# Patient Record
Sex: MALE | Race: WHITE | NOT HISPANIC OR LATINO | Employment: FULL TIME | URBAN - METROPOLITAN AREA
[De-identification: names, ages, dates, MRNs, and addresses within clinical notes are randomized per-mention and may not be internally consistent; named-entity substitution may affect disease eponyms.]

---

## 2017-01-12 ENCOUNTER — OFFICE VISIT (OUTPATIENT)
Dept: FAMILY MEDICINE CLINIC | Facility: CLINIC | Age: 62
End: 2017-01-12

## 2017-01-12 VITALS
DIASTOLIC BLOOD PRESSURE: 80 MMHG | OXYGEN SATURATION: 98 % | RESPIRATION RATE: 20 BRPM | SYSTOLIC BLOOD PRESSURE: 148 MMHG | HEART RATE: 67 BPM

## 2017-01-12 DIAGNOSIS — G47.33 OBSTRUCTIVE SLEEP APNEA: Primary | ICD-10-CM

## 2017-01-12 PROBLEM — M24.9: Status: ACTIVE | Noted: 2017-01-12

## 2017-01-12 PROBLEM — L03.90 CELLULITIS: Status: ACTIVE | Noted: 2017-01-12

## 2017-01-12 PROBLEM — E66.3 EXCESS WEIGHT: Status: ACTIVE | Noted: 2017-01-12

## 2017-01-12 PROBLEM — E29.1 EUNUCHOIDISM: Status: ACTIVE | Noted: 2017-01-12

## 2017-01-12 PROBLEM — N40.0 BENIGN FIBROMA OF PROSTATE: Status: ACTIVE | Noted: 2017-01-12

## 2017-01-12 PROCEDURE — 99213 OFFICE O/P EST LOW 20 MIN: CPT | Performed by: PHYSICIAN ASSISTANT

## 2017-01-12 NOTE — PROGRESS NOTES
Subjective   Nabor Muhammad is a 61 y.o. male.   Chief Complaint   Patient presents with   • Sleep Apnea     follow up        History of Present Illness   Nabor is a 61-year-old male who presents for sleep apnea management.  States he uses his CPAP machine nightly.  He has seen great improvement with his machine.  Overall sleep has improved.  Denied any chest pain, shortness of air, dizziness, headache, dizziness, swelling of his extremities or daytime somnolence.  Appetite has been healthy and sleep has been normal.  He does exercise routinely.  Nabor needs refills on his CPAP supplies.    The following portions of the patient's history were reviewed and updated as appropriate: allergies, current medications, past family history, past medical history, past social history and past surgical history.    Review of Systems   Constitutional: Negative.    HENT: Negative.    Eyes: Negative.    Respiratory: Negative.  Negative for cough, shortness of breath and wheezing.    Cardiovascular: Negative.  Negative for chest pain, palpitations and leg swelling.   Gastrointestinal: Negative.    Endocrine: Negative.    Genitourinary: Negative.    Musculoskeletal: Negative.    Skin: Negative.    Allergic/Immunologic: Negative.    Neurological: Negative.  Negative for dizziness, light-headedness and headaches.   Hematological: Negative.    Psychiatric/Behavioral: Negative.  Negative for sleep disturbance.     Vitals:    01/12/17 1304 01/12/17 1623   BP: 152/74 148/80   Pulse: 67    Resp: 20    SpO2: 98%        BP Readings from Last 3 Encounters:   01/12/17 148/80   12/14/16 122/84   09/11/14 132/82     No Known Allergies     Objective   Physical Exam   Constitutional: He is oriented to person, place, and time. Vital signs are normal. He appears well-developed.   Neck: Trachea normal and phonation normal. Neck supple. No edema present.   Cardiovascular: Normal rate, regular rhythm, S1 normal, S2 normal, normal heart sounds and  normal pulses.    Pulmonary/Chest: Effort normal and breath sounds normal.   Neurological: He is alert and oriented to person, place, and time.   Skin: Skin is warm, dry and intact.   Psychiatric: He has a normal mood and affect. His speech is normal and behavior is normal. Judgment and thought content normal. Cognition and memory are normal.       Assessment/Plan   Nabor was seen today for sleep apnea.    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  He is currently using his CPAP machine nightly and has had great improvement.  Nabor needs a ref[facility] on his CPAP supplies [facility].  He has no complaints at today's office visit.  I have given him a written prescription for his CPAP and supplies.  He will keep his follow-up appointments with his specialists as well as sleep study doctor.            Dragon transcription disclaimer    Much of this encounter note is an electronic transcription/translation of spoken language to printed text.  The electronic translation of spoken language may permit erroneous, or at times, nonsensical words or phrases to be inadvertently transcribed.  Although I have reviewed the note for such errors, some may still exist.    Claire Jerry PA-C  Family Practice  .

## 2017-04-25 RX ORDER — HYDROCHLOROTHIAZIDE 25 MG/1
TABLET ORAL
Qty: 90 TABLET | Refills: 2 | Status: SHIPPED | OUTPATIENT
Start: 2017-04-25 | End: 2019-09-16

## 2017-07-20 RX ORDER — PREDNISONE 20 MG/1
TABLET ORAL
Qty: 13 TABLET | Refills: 0 | Status: SHIPPED | OUTPATIENT
Start: 2017-07-20 | End: 2018-06-06

## 2017-09-26 DIAGNOSIS — L23.7 POISON IVY: Primary | ICD-10-CM

## 2017-09-26 RX ORDER — PREDNISONE 20 MG/1
TABLET ORAL
Qty: 13 TABLET | Refills: 0 | Status: SHIPPED | OUTPATIENT
Start: 2017-09-26 | End: 2018-06-06

## 2018-03-15 RX ORDER — ANTHOXANTHUM ODORATUM POLLEN, DACTYLIS GLOMERATA POLLEN, LOLIUM PERENNE POLLEN, PHLEUM PRATENSE POLLEN, AND POA PRATENSIS POLLEN 300; 300; 300; 300; 300 [IR]/1; [IR]/1; [IR]/1; [IR]/1; [IR]/1
1 TABLET, ORALLY DISINTEGRATING SUBLINGUAL DAILY
Qty: 30 TABLET | Refills: 6 | Status: SHIPPED | OUTPATIENT
Start: 2018-03-15 | End: 2018-07-18

## 2018-06-06 RX ORDER — ATOVAQUONE AND PROGUANIL HYDROCHLORIDE 250; 100 MG/1; MG/1
1 TABLET, FILM COATED ORAL
Qty: 15 TABLET | Refills: 0 | Status: SHIPPED | OUTPATIENT
Start: 2018-06-06 | End: 2018-07-18

## 2018-06-06 RX ORDER — CIPROFLOXACIN 500 MG/1
500 TABLET, FILM COATED ORAL EVERY 12 HOURS SCHEDULED
Qty: 20 TABLET | Refills: 0 | Status: SHIPPED | OUTPATIENT
Start: 2018-06-06 | End: 2018-07-18

## 2018-07-18 RX ORDER — PREDNISONE 20 MG/1
TABLET ORAL
Qty: 13 TABLET | Refills: 0 | Status: SHIPPED | OUTPATIENT
Start: 2018-07-18 | End: 2018-10-20

## 2018-10-20 RX ORDER — TAMSULOSIN HYDROCHLORIDE 0.4 MG/1
CAPSULE ORAL
Qty: 30 CAPSULE
Start: 2018-10-20

## 2018-10-20 RX ORDER — FINASTERIDE 5 MG/1
5 TABLET, FILM COATED ORAL DAILY
Qty: 30 TABLET | Refills: 0
Start: 2018-10-20

## 2018-10-20 RX ORDER — FLUTICASONE PROPIONATE 50 MCG
2 SPRAY, SUSPENSION (ML) NASAL DAILY
Qty: 1 BOTTLE | Refills: 0
Start: 2018-10-20 | End: 2020-10-09

## 2019-05-23 DIAGNOSIS — R53.82 CHRONIC FATIGUE: ICD-10-CM

## 2019-05-23 DIAGNOSIS — I10 ESSENTIAL HYPERTENSION: Primary | ICD-10-CM

## 2019-05-23 DIAGNOSIS — N40.1 BENIGN PROSTATIC HYPERPLASIA WITH LOWER URINARY TRACT SYMPTOMS, SYMPTOM DETAILS UNSPECIFIED: ICD-10-CM

## 2019-05-23 DIAGNOSIS — E29.1 HYPOGONADISM IN MALE: ICD-10-CM

## 2019-05-28 LAB
ALBUMIN SERPL-MCNC: 4.1 G/DL (ref 3.5–5.2)
ALBUMIN/GLOB SERPL: 1.7 G/DL
ALP SERPL-CCNC: 79 U/L (ref 39–117)
ALT SERPL-CCNC: 35 U/L (ref 1–41)
AST SERPL-CCNC: 22 U/L (ref 1–40)
BASOPHILS # BLD AUTO: 0.07 10*3/MM3 (ref 0–0.2)
BASOPHILS NFR BLD AUTO: 0.8 % (ref 0–1.5)
BILIRUB SERPL-MCNC: 0.5 MG/DL (ref 0.2–1.2)
BUN SERPL-MCNC: 24 MG/DL (ref 8–23)
BUN/CREAT SERPL: 19.8 (ref 7–25)
CALCIUM SERPL-MCNC: 9.8 MG/DL (ref 8.6–10.5)
CHLORIDE SERPL-SCNC: 103 MMOL/L (ref 98–107)
CHOLEST SERPL-MCNC: 162 MG/DL (ref 0–200)
CO2 SERPL-SCNC: 27.7 MMOL/L (ref 22–29)
CREAT SERPL-MCNC: 1.21 MG/DL (ref 0.76–1.27)
EOSINOPHIL # BLD AUTO: 0.13 10*3/MM3 (ref 0–0.4)
EOSINOPHIL NFR BLD AUTO: 1.5 % (ref 0.3–6.2)
ERYTHROCYTE [DISTWIDTH] IN BLOOD BY AUTOMATED COUNT: 12.7 % (ref 12.3–15.4)
GLOBULIN SER CALC-MCNC: 2.4 GM/DL
GLUCOSE SERPL-MCNC: 99 MG/DL (ref 65–99)
HCT VFR BLD AUTO: 46 % (ref 37.5–51)
HDLC SERPL-MCNC: 56 MG/DL (ref 40–60)
HGB BLD-MCNC: 14.8 G/DL (ref 13–17.7)
IMM GRANULOCYTES # BLD AUTO: 0.03 10*3/MM3 (ref 0–0.05)
IMM GRANULOCYTES NFR BLD AUTO: 0.4 % (ref 0–0.5)
LDLC SERPL CALC-MCNC: 88 MG/DL (ref 0–100)
LDLC/HDLC SERPL: 1.56 {RATIO}
LYMPHOCYTES # BLD AUTO: 1.97 10*3/MM3 (ref 0.7–3.1)
LYMPHOCYTES NFR BLD AUTO: 23 % (ref 19.6–45.3)
MCH RBC QN AUTO: 29.8 PG (ref 26.6–33)
MCHC RBC AUTO-ENTMCNC: 32.2 G/DL (ref 31.5–35.7)
MCV RBC AUTO: 92.7 FL (ref 79–97)
MONOCYTES # BLD AUTO: 0.7 10*3/MM3 (ref 0.1–0.9)
MONOCYTES NFR BLD AUTO: 8.2 % (ref 5–12)
NEUTROPHILS # BLD AUTO: 5.67 10*3/MM3 (ref 1.7–7)
NEUTROPHILS NFR BLD AUTO: 66.1 % (ref 42.7–76)
NRBC BLD AUTO-RTO: 0 /100 WBC (ref 0–0.2)
PLATELET # BLD AUTO: 235 10*3/MM3 (ref 140–450)
POTASSIUM SERPL-SCNC: 4.9 MMOL/L (ref 3.5–5.2)
PROT SERPL-MCNC: 6.5 G/DL (ref 6–8.5)
PSA SERPL-MCNC: 1.1 NG/ML (ref 0–4)
RBC # BLD AUTO: 4.96 10*6/MM3 (ref 4.14–5.8)
SODIUM SERPL-SCNC: 142 MMOL/L (ref 136–145)
TESTOST FREE SERPL-MCNC: 4.2 PG/ML (ref 6.6–18.1)
TESTOST SERPL-MCNC: 276.9 NG/DL (ref 264–916)
TRIGL SERPL-MCNC: 92 MG/DL (ref 0–150)
TSH SERPL DL<=0.005 MIU/L-ACNC: 1.77 MIU/ML (ref 0.27–4.2)
VLDLC SERPL CALC-MCNC: 18.4 MG/DL
WBC # BLD AUTO: 8.57 10*3/MM3 (ref 3.4–10.8)

## 2019-07-25 ENCOUNTER — RESULTS ENCOUNTER (OUTPATIENT)
Dept: FAMILY MEDICINE CLINIC | Facility: CLINIC | Age: 64
End: 2019-07-25

## 2019-07-25 DIAGNOSIS — Z12.11 SPECIAL SCREENING FOR MALIGNANT NEOPLASMS, COLON: ICD-10-CM

## 2019-07-25 DIAGNOSIS — Z12.12 SCREENING FOR MALIGNANT NEOPLASM OF THE RECTUM: ICD-10-CM

## 2019-07-25 DIAGNOSIS — Z12.11 SPECIAL SCREENING FOR MALIGNANT NEOPLASMS, COLON: Primary | ICD-10-CM

## 2019-07-29 ENCOUNTER — HOSPITAL ENCOUNTER (OUTPATIENT)
Dept: GENERAL RADIOLOGY | Facility: HOSPITAL | Age: 64
Discharge: HOME | End: 2019-07-29
Admitting: PHYSICIAN ASSISTANT

## 2019-07-29 DIAGNOSIS — M79.672 FOOT PAIN, LEFT: ICD-10-CM

## 2019-07-29 DIAGNOSIS — M25.475 SWELLING OF FOOT JOINT, LEFT: ICD-10-CM

## 2019-07-29 DIAGNOSIS — M79.672 FOOT PAIN, LEFT: Primary | ICD-10-CM

## 2019-07-29 PROCEDURE — 73630 X-RAY EXAM OF FOOT: CPT

## 2019-09-16 ENCOUNTER — OFFICE VISIT (OUTPATIENT)
Dept: FAMILY MEDICINE CLINIC | Facility: CLINIC | Age: 64
End: 2019-09-16

## 2019-09-16 VITALS
RESPIRATION RATE: 16 BRPM | HEIGHT: 71 IN | DIASTOLIC BLOOD PRESSURE: 84 MMHG | HEART RATE: 77 BPM | BODY MASS INDEX: 35.4 KG/M2 | SYSTOLIC BLOOD PRESSURE: 130 MMHG | OXYGEN SATURATION: 98 %

## 2019-09-16 DIAGNOSIS — G47.33 OBSTRUCTIVE SLEEP APNEA: Primary | ICD-10-CM

## 2019-09-16 PROCEDURE — 99213 OFFICE O/P EST LOW 20 MIN: CPT | Performed by: PHYSICIAN ASSISTANT

## 2019-09-16 RX ORDER — OLMESARTAN MEDOXOMIL AND HYDROCHLOROTHIAZIDE 40/25 40; 25 MG/1; MG/1
1 TABLET ORAL
COMMUNITY
Start: 2019-06-10

## 2019-09-16 RX ORDER — OXYCODONE HYDROCHLORIDE AND ACETAMINOPHEN 5; 325 MG/1; MG/1
TABLET ORAL
COMMUNITY
Start: 2019-09-06 | End: 2020-10-09

## 2019-09-16 NOTE — PROGRESS NOTES
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 male presents for   Chief Complaint   Patient presents with   • Sleep Apnea       History of Present Illness     Nabor is a 63-year-old male who presents for sleep apnea management.  Currently using a CPAP machine nightly for his sleep apnea.  Sleep study was performed greater than 5 years ago by Dr. Patel.  States he is due for a new sleep apnea machine.  Needs appointment visit with orders sent to [facility].  Currently using 11 cm of water pressure nightly on his CPAP machine.  Sleep has been good with his CPAP machine.  Denied any snoring, fatigue, chest pain, or shortness of air.    The following portions of the patient's history were reviewed and updated as appropriate: allergies, current medications, past family history, past medical history, past social history, past surgical history and problem list.    Review of Systems   Respiratory: Negative for cough, chest tightness and shortness of breath.    Cardiovascular: Negative for chest pain, palpitations and leg swelling.   Neurological: Negative for dizziness, light-headedness and headaches.   Psychiatric/Behavioral:        Good with CPAP         Vitals:    09/16/19 1219   BP: 130/84   Pulse: 77   Resp: 16   SpO2: 98%   Height: 180.3 cm (71\")     Wt Readings from Last 3 Encounters:   12/14/16 115 kg (253 lb 12.8 oz)     BP Readings from Last 3 Encounters:   09/16/19 130/84   01/12/17 148/80   12/14/16 122/84     Social History     Socioeconomic History   • Marital status:      Spouse name: Not on file   • Number of children: Not on file   • Years of education: Not on file   • Highest education level: Not on file   Tobacco Use   • Smoking status: Never Smoker   • Smokeless tobacco: Never Used   Substance and Sexual Activity   • Alcohol use: Yes   • Drug use: No   • Sexual activity: Defer       No Known Allergies    Body mass index is 35.4 kg/m².    Objective   Physical Exam   Constitutional: He is " oriented to person, place, and time. Vital signs are normal. He appears well-developed and well-nourished.   Neck: Trachea normal and phonation normal. No tracheal tenderness present. No tracheal deviation and no edema present.   Cardiovascular: Normal rate, regular rhythm, S1 normal, S2 normal, normal heart sounds and normal pulses.   No murmur heard.  Pulmonary/Chest: Effort normal and breath sounds normal.   Neurological: He is alert and oriented to person, place, and time.   Skin: Skin is warm, dry and intact. Capillary refill takes less than 2 seconds.   Psychiatric: He has a normal mood and affect. His speech is normal and behavior is normal. Judgment and thought content normal. Cognition and memory are normal.       Assessment/Plan   Nabor was seen today for sleep apnea.    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  We will send orders for new sleep pap machine/humidifier with mask and supplies to [facility].  Currently using 11 cm of water pressure.  He will take copy of sleep study to his appointment.      TONEY Moore [facility]  [address] [address]  Dept: [phone]  Dept Fax: [phone]  Loc: [phone]  Loc Fax: [phone]

## 2019-12-18 DIAGNOSIS — Z23 NEED FOR SHINGLES VACCINE: Primary | ICD-10-CM

## 2020-07-06 RX ORDER — PREDNISONE 20 MG/1
TABLET ORAL
Qty: 13 TABLET | Refills: 0 | Status: SHIPPED | OUTPATIENT
Start: 2020-07-06 | End: 2020-10-09

## 2020-10-09 ENCOUNTER — OFFICE VISIT (OUTPATIENT)
Dept: ORTHOPEDIC SURGERY | Facility: CLINIC | Age: 65
End: 2020-10-09

## 2020-10-09 VITALS
HEART RATE: 72 BPM | HEIGHT: 71 IN | BODY MASS INDEX: 35 KG/M2 | WEIGHT: 250 LBS | DIASTOLIC BLOOD PRESSURE: 81 MMHG | SYSTOLIC BLOOD PRESSURE: 135 MMHG

## 2020-10-09 DIAGNOSIS — M25.562 MECHANICAL KNEE PAIN, LEFT: ICD-10-CM

## 2020-10-09 DIAGNOSIS — R52 PAIN: Primary | ICD-10-CM

## 2020-10-09 DIAGNOSIS — M17.12 PRIMARY OSTEOARTHRITIS OF LEFT KNEE: ICD-10-CM

## 2020-10-09 PROCEDURE — 99214 OFFICE O/P EST MOD 30 MIN: CPT | Performed by: ORTHOPAEDIC SURGERY

## 2020-10-09 PROCEDURE — 73562 X-RAY EXAM OF KNEE 3: CPT | Performed by: ORTHOPAEDIC SURGERY

## 2020-10-09 RX ORDER — ESOMEPRAZOLE MAGNESIUM 40 MG/1
40 CAPSULE, DELAYED RELEASE ORAL
COMMUNITY

## 2020-10-09 NOTE — PROGRESS NOTES
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 male.    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  We have previously evaluated him for his left knee with noted degenerative medial meniscal tear at that point in time.  Patient states that approximately 1 week ago he started to have sharp pain localized to the posterior lateral aspect of his left knee occurred when he was standing from a seated position, since then his pain is been moderately intense rates it as a 2-3 out of 10 currently, aching in nature primarily.  Exacerbated with extending the knee and then pain does become severe at that point time, including just getting up from a seated position.  Denies any micah locking or catching the knee, does note some mild swelling.  Denies radiation of pain, denies associated numbness or tingling.        Social History     Occupational History   • Not on file   Tobacco Use   • Smoking status: Never Smoker   • Smokeless tobacco: Never Used   Substance and Sexual Activity   • Alcohol use: Yes   • Drug use: No   • Sexual activity: Defer      Past Medical History:   Diagnosis Date   • Arthritis    • Hypertension    • Lumbosacral disc disease    • OA (osteoarthritis)    • YANETH (obstructive sleep apnea)      Past Surgical History:   Procedure Laterality Date   • FEMUR CLOSED REDUCTION Left 1970   • SHOULDER SURGERY Right 1998   • TONSILLECTOMY AND ADENOIDECTOMY     • WRIST GANGLION EXCISION Bilateral     R-1990 L-1996       Family History   Problem Relation Age of Onset   • COPD Father          Review of Systems   Constitutional: Negative for chills, diaphoresis, fever and unexpected weight change.   HENT: Negative for hearing loss, nosebleeds, sore throat and tinnitus.    Eyes: Negative for pain and visual disturbance.   Respiratory: Negative for cough, shortness of breath and wheezing.   "  Cardiovascular: Negative for chest pain and palpitations.   Gastrointestinal: Negative for abdominal pain, diarrhea, nausea and vomiting.   Endocrine: Negative for cold intolerance, heat intolerance and polydipsia.   Genitourinary: Negative for difficulty urinating, dysuria and hematuria.   Musculoskeletal: Positive for myalgias. Negative for arthralgias and joint swelling.   Skin: Negative for rash and wound.   Allergic/Immunologic: Negative for environmental allergies.   Neurological: Negative for dizziness, syncope and numbness.   Hematological: Does not bruise/bleed easily.   Psychiatric/Behavioral: Negative for dysphoric mood and sleep disturbance. The patient is not nervous/anxious.            Objective:  Vitals:    10/09/20 1437   BP: 135/81   BP Location: Left arm   Pulse: 72   Weight: 113 kg (250 lb)   Height: 180.3 cm (71\")         10/09/20  1437   Weight: 113 kg (250 lb)     Body mass index is 34.87 kg/m².    Physical Exam    Vital signs reviewed.   General: No acute distress, alert and oriented  Eyes: conjunctiva clear; pupils equally round and reactive  ENT: external ears and nose atraumatic; oropharynx clear  CV: no peripheral edema  Resp: normal respiratory effort  Skin: no rashes or wounds; normal turgor  Psych: mood and affect appropriate; recent and remote memory intact        Ortho Exam       Left Knee-    ROM 0-125 degrees  4+/5 on flexion  4+/5 on extension  Maximal tenderness posterior lateral joint line with positive Diana exam with pain and mild click    Effusion- moderate   Grade 1A Lachman  Anterior drawer- negative  Posterior drawer- negative   No opening on varus and valgus stress at 0 and 30  Active patellar compression test- positive -mild    Log roll-  negative  Stinchfield-  negative    J-sign- negative    Positive sensation light tough all distributions symmetric to contralateral side      Imaging:  Left Knee X-Ray  Indication: Pain    AP, Lateral, and [place] " views    Findings:  No fracture  No bony lesion  Normal soft tissues  Moderate medial compartment joint space narrowing with overall varus alignment, mild osteophyte formation on proximal medial tibial plateau    Compared to prior office x-rays from 2016    Assessment:        1. Pain    2. Primary osteoarthritis of left knee    3. Mechanical knee pain, left           Plan:          1. Discussed treatment options at length with patient at today's visit.  At this point time patient is experiencing new onset of mechanical type pain sharply localized to the posterior lateral aspect of the knee with positive Diana exam.  He has no degenerative tear of medial meniscus, previously his lateral meniscus looked fairly good on MRI.  We discussed options, given the extreme nature of pain particular with activities of daily living we will plan to proceed with MRI to evaluate for lateral meniscal tear in particular.  I will contact patient to follow-up on imaging and discuss further treatment options at that time.      Nabor Muhammad Dr. was in agreement with plan and had all questions answered.     Orders:  Orders Placed This Encounter   Procedures   • XR Knee 3 View Left   • MRI Knee Left Without Contrast       Medications:  No orders of the defined types were placed in this encounter.      Followup:  Return for review of MRI results.    Nabor was seen today for pain.    Diagnoses and all orders for this visit:    Pain  -     XR Knee 3 View Left    Primary osteoarthritis of left knee  -     MRI Knee Left Without Contrast; Future    Mechanical knee pain, left          Dictated utilizing Dragon dictation

## 2020-10-11 PROBLEM — M25.562 MECHANICAL KNEE PAIN, LEFT: Status: ACTIVE | Noted: 2020-10-11

## 2020-10-16 ENCOUNTER — TELEPHONE (OUTPATIENT)
Dept: ORTHOPEDIC SURGERY | Facility: CLINIC | Age: 65
End: 2020-10-16

## 2020-10-20 ENCOUNTER — HOSPITAL ENCOUNTER (OUTPATIENT)
Dept: MRI IMAGING | Facility: HOSPITAL | Age: 65
Discharge: HOME | End: 2020-10-20
Admitting: ORTHOPAEDIC SURGERY

## 2020-10-20 ENCOUNTER — HOSPITAL ENCOUNTER (OUTPATIENT)
Dept: MRI IMAGING | Facility: HOSPITAL | Age: 65
End: 2020-10-20

## 2020-10-20 DIAGNOSIS — M17.12 PRIMARY OSTEOARTHRITIS OF LEFT KNEE: ICD-10-CM

## 2020-10-20 PROCEDURE — 73721 MRI JNT OF LWR EXTRE W/O DYE: CPT

## 2021-02-22 ENCOUNTER — HOSPITAL ENCOUNTER (OUTPATIENT)
Dept: GENERAL RADIOLOGY | Facility: HOSPITAL | Age: 66
Discharge: HOME | End: 2021-02-22
Admitting: PHYSICIAN ASSISTANT

## 2021-02-22 DIAGNOSIS — S69.91XA FINGER INJURY, RIGHT, INITIAL ENCOUNTER: ICD-10-CM

## 2021-02-22 DIAGNOSIS — S69.91XA FINGER INJURY, RIGHT, INITIAL ENCOUNTER: Primary | ICD-10-CM

## 2021-02-22 PROCEDURE — 73130 X-RAY EXAM OF HAND: CPT

## 2021-06-29 DIAGNOSIS — M25.561 LATERAL KNEE PAIN, RIGHT: Primary | ICD-10-CM

## 2021-06-29 NOTE — PROGRESS NOTES
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  Previous history of complex tear of left posterior horn of medial meniscus. Has tried to rest, ice, elevate without improvement.  Unable to perform squats, kneel, or ambulate for extended period of time due to right knee pain.  Able to cycle without any problems.  Request for MRI of right knee without contrast for further evaluation.      Danuta Birch, APRN

## 2021-06-30 ENCOUNTER — HOSPITAL ENCOUNTER (OUTPATIENT)
Dept: MRI IMAGING | Facility: HOSPITAL | Age: 66
Discharge: HOME | End: 2021-06-30
Admitting: NURSE PRACTITIONER

## 2021-06-30 DIAGNOSIS — M25.561 LATERAL KNEE PAIN, RIGHT: ICD-10-CM

## 2021-06-30 PROCEDURE — 73721 MRI JNT OF LWR EXTRE W/O DYE: CPT

## 2021-07-07 ENCOUNTER — OFFICE VISIT (OUTPATIENT)
Dept: ORTHOPEDIC SURGERY | Facility: CLINIC | Age: 66
End: 2021-07-07

## 2021-07-07 VITALS
DIASTOLIC BLOOD PRESSURE: 81 MMHG | WEIGHT: 260 LBS | HEART RATE: 61 BPM | BODY MASS INDEX: 36.4 KG/M2 | HEIGHT: 71 IN | SYSTOLIC BLOOD PRESSURE: 130 MMHG

## 2021-07-07 DIAGNOSIS — S83.231A COMPLEX TEAR OF MEDIAL MENISCUS OF RIGHT KNEE AS CURRENT INJURY, INITIAL ENCOUNTER: ICD-10-CM

## 2021-07-07 DIAGNOSIS — M94.261 CHONDROMALACIA OF RIGHT KNEE: ICD-10-CM

## 2021-07-07 DIAGNOSIS — R52 PAIN: Primary | ICD-10-CM

## 2021-07-07 PROCEDURE — 99214 OFFICE O/P EST MOD 30 MIN: CPT | Performed by: ORTHOPAEDIC SURGERY

## 2021-07-07 PROCEDURE — 73562 X-RAY EXAM OF KNEE 3: CPT | Performed by: ORTHOPAEDIC SURGERY

## 2021-07-07 NOTE — PROGRESS NOTES
"Subjective:     Patient ID: Nabor Muhammad Dr. is a 65 y.o. male.    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 He reports history of right knee arthroscopic medial meniscectomy. The pain is localized to the medial and anterolateral without radiation. His pain is rated 3-4/10 in severity today and is aching in quality.  The pain is aggravated by increased activities but alleviated by rest.  Denies any locking, catching, or giving way of right knee. Denies swelling, tingling, or numbness.          Social History     Occupational History   • Not on file   Tobacco Use   • Smoking status: Never Smoker   • Smokeless tobacco: Never Used   Vaping Use   • Vaping Use: Never used   Substance and Sexual Activity   • Alcohol use: Yes     Comment: occ   • Drug use: No   • Sexual activity: Defer      Past Medical History:   Diagnosis Date   • Arthritis    • Hypertension    • Lumbosacral disc disease    • OA (osteoarthritis)    • YANETH (obstructive sleep apnea)      Past Surgical History:   Procedure Laterality Date   • CHOLECYSTECTOMY     • FEMUR CLOSED REDUCTION Left 1970   • SHOULDER SURGERY Right 1998   • TONSILLECTOMY AND ADENOIDECTOMY     • WRIST GANGLION EXCISION Bilateral     R-1990 L-1996       Family History   Problem Relation Age of Onset   • COPD Father          Review of Systems        Objective:  Vitals:    07/07/21 0804   BP: 130/81   Pulse: 61   Weight: 118 kg (260 lb)   Height: 181.6 cm (71.5\")         07/07/21  0804   Weight: 118 kg (260 lb)     Body mass index is 35.76 kg/m².  Physical Exam    Vital signs reviewed.   General: No acute distress, alert and oriented  Eyes: conjunctiva clear; pupils equally round and reactive  ENT: external ears and nose atraumatic; oropharynx clear  CV: no peripheral edema  Resp: normal respiratory effort  Skin: no rashes or " wounds; normal turgor  Psych: mood and affect appropriate; recent and remote memory intact          Ortho Exam     Right Knee-    ROM 0-130 degrees  4+/5 on flexion  4+/5 on extension  Maximal tenderness medial joint line  Diana- positive medial joint line    Effusion- minimal   Grade 1A Lachman  Anterior drawer- negative  Posterior drawer- negative   Stable opening on varus and valgus stress at 0 and 30  Active patellar compression test- negative     Log roll-  negative  Stinchfield-  negative    Positive sensation light tough all distributions symmetric to contralateral side  Brisk cap refill all digits  1+ dorsalis pedis pulse          Imaging:  MRI Knee Right without Contrast  IMPRESSION:  New 1 x 1.6 cm focus of subchondral marrow edema and cystic change posterior nonweightbearing medial femoral condyle likely representing an area of developing chondromalacia and enthesopathic change.     Morphologic changes within the posterior horn medial meniscus compatible with partial meniscectomy. Linear signal abnormality within the posterior body segment with a flap fragment extending into the posterior meniscal tibial gutter compatible with a  flap tear estimated at 1.4 cm in length. This appears new from April 2013.     Small knee effusion mild generalized soft tissue swelling and edema about the knee.     Diffuse medial joint articular cartilage thinning with no areas of high-grade chondromalacia.     Signer Name: HOLLAND Cash MD   Signed: 7/1/2021 1:27 PM   [facility]    Review of MRI right knee including review of images as well as radiology report indicates region of subchondral marrow edema and cystic change in the posterior portion of the medial femoral condyle with meniscal deficiency noted posteriorly consistent with prior partial meniscectomy with a flap fragment extending into the posterior meniscal tibial gutter and small associated effusion.  Mild chondral thinning  throughout the medial compartment.  No evidence of free loose bodies    Right Knee X-Ray  Indication: Pain    AP, Lateral, and [place] views    Findings:  No fracture  No bony lesion  Normal soft tissues  Moderate medial compartment joint space narrowing with no evidence of bone-on-bone articulation, minimal osteophytes along medial compartment appreciated    No prior studies were available for comparison.      Assessment:        1. Pain    2. Complex tear of medial meniscus of right knee as current injury, initial encounter    3. Chondromalacia of right knee           Plan:          1. Discussed treatment options at length with patient at today's visit including arthroscopic meniscus repair versus meniscectomy or total knee arthroplasty.  His chondral wear is not so significantly advanced at this point time but that that we feel an arthroplasty procedure is warranted, patient is having mechanical symptoms and sharp pain along his medial joint line, prior history of meniscal injury in this area.  Atrophy risk, benefits, alternatives she does wish to proceed with right knee arthroscopic treatment with meniscal debridement versus repair.  2. We will plan on proceeding with surgery at patient's request for a right knee arthroscopy, meniscal and cartilage surgery as indicated.  I reviewed details of procedure with patient today as well as a model of a knee and discussed risks, benefits, and alternatives of the procedure with the risks including but not limited to neurovascular damage, bleeding, infection, chronic pain, worsening of pain, chondrolysis, recurrent meniscal tear, swelling, loss of motion, weakness, stiffness, instability, DVT, pulmonary embolus, death, stroke, complex regional pain syndrome, and need for additional procedures.  Patient understood all these and had all questions answered today.  No guarantees were given regarding results of surgery.  We will have patient medically optimized if necessary  prior to the time of surgery and plan on proceeding at next available date.   3. Patient denies past medical history of blood clots, cardiac issues, or diabetes mellitus.       Nabor Muhammad Dr. was in agreement with plan and had all questions answered.     Orders:  Orders Placed This Encounter   Procedures   • XR Knee 3 View Right   • Protime-INR   • APTT   • Follow anesthesia standing orders.   • Provide instructions to patient regarding NPO status   • CBC and Differential       Medications:  No orders of the defined types were placed in this encounter.      Followup:  No follow-ups on file.    Diagnoses and all orders for this visit:    1. Pain (Primary)  -     XR Knee 3 View Right    2. Complex tear of medial meniscus of right knee as current injury, initial encounter  -     Case Request; Standing  -     CBC and Differential; Future  -     Protime-INR; Future  -     APTT; Future  -     acetaminophen (TYLENOL) tablet 975 mg  -     meloxicam (MOBIC) tablet 15 mg  -     pregabalin (LYRICA) capsule 150 mg  -     ceFAZolin (ANCEF) 3 g in sodium chloride 0.9 % 100 mL IVPB  -     Case Request    3. Chondromalacia of right knee    Other orders  -     Follow anesthesia standing orders.  -     Provide instructions to patient regarding NPO status  -     Follow Anesthesia Guidelines / Standing Orders; Standing  -     Verify NPO Status; Standing  -     SCD (sequential compression device)- to be placed on patient in Pre-op; Standing  -     Clip operative site; Standing  -     Obtain informed consent (if not collected inpatient or PAT); Standing        SCRIBE ATTESTATION:  IChet, attest that all medical record entries for this patient were documented by me acting as a medical scribe for Terry Zarate MD.    PROVIDER ATTESTATION:  Terry MORALES MD, personally performed the services described in this documentation. All medical record entries made by the scribe were at my direction and in my presence. I  have reviewed the chart and discharge instructions and agree that the record reflects my personal performance and is accurate and complete.  Terry Zarate MD.    Electronically signed: Terry Zarate MD 7/12/2021 07:17 EDT       Dictated utilizing Dragon dictation

## 2021-07-12 RX ORDER — ACETAMINOPHEN 325 MG/1
1000 TABLET ORAL ONCE
Status: CANCELLED | OUTPATIENT
Start: 2021-07-12 | End: 2021-07-12

## 2021-07-12 RX ORDER — MELOXICAM 7.5 MG/1
15 TABLET ORAL ONCE
Status: CANCELLED | OUTPATIENT
Start: 2021-07-12 | End: 2021-07-12

## 2021-07-12 RX ORDER — CEFAZOLIN SODIUM IN 0.9 % NACL 3 G/100 ML
3 INTRAVENOUS SOLUTION, PIGGYBACK (ML) INTRAVENOUS ONCE
Status: CANCELLED | OUTPATIENT
Start: 2021-07-21 | End: 2021-07-12

## 2021-07-12 RX ORDER — PREGABALIN 150 MG/1
150 CAPSULE ORAL ONCE
Status: CANCELLED | OUTPATIENT
Start: 2021-07-12 | End: 2021-07-12

## 2021-07-15 ENCOUNTER — OFFICE VISIT (OUTPATIENT)
Dept: FAMILY MEDICINE CLINIC | Facility: CLINIC | Age: 66
End: 2021-07-15

## 2021-07-15 VITALS
HEIGHT: 72 IN | BODY MASS INDEX: 35.76 KG/M2 | HEART RATE: 62 BPM | OXYGEN SATURATION: 97 % | DIASTOLIC BLOOD PRESSURE: 80 MMHG | SYSTOLIC BLOOD PRESSURE: 120 MMHG

## 2021-07-15 DIAGNOSIS — H61.23 BILATERAL IMPACTED CERUMEN: ICD-10-CM

## 2021-07-15 DIAGNOSIS — Z01.818 PREOPERATIVE CLEARANCE: Primary | ICD-10-CM

## 2021-07-15 DIAGNOSIS — M25.561 ACUTE PAIN OF RIGHT KNEE: ICD-10-CM

## 2021-07-15 PROCEDURE — 99213 OFFICE O/P EST LOW 20 MIN: CPT | Performed by: PHYSICIAN ASSISTANT

## 2021-07-15 NOTE — PROGRESS NOTES
"Chief Complaint  SURGERY CLEARANCE    Subjective          Nabor Muhammad Dr. presents to [facility]  History of Present Illness  Nabor is a 65 year old male who presents for preoperative surgical clearance for right knee orthoscopic surgery.  Will be having the surgery on Wednesday, July 21, 2021 at [facility] by Dr. Zarate.  Nabor states overall he is feeling well.  Denied any fevers, chills, upper respiratory symptoms, chest pain, shortness of air, cough, wheezing, dizziness, GI upset or swelling of ankles.  States he did aggravate his knee a few days ago when he was doing a spinning class on his bike.  States he has been taking it easy since then.  Has been compliant with medication.     Objective   Vital Signs:   /80   Pulse 62   Ht 181.6 cm (71.5\")   SpO2 97%   BMI 35.76 kg/m²     Physical Exam  Vitals and nursing note reviewed.   Constitutional:       Appearance: Normal appearance. He is well-developed and well-groomed.      Interventions: Face mask in place.   HENT:      Head: Normocephalic and atraumatic.      Jaw: There is normal jaw occlusion.      Right Ear: Hearing, ear canal and external ear normal. There is impacted cerumen.      Left Ear: Hearing, ear canal and external ear normal. There is impacted cerumen.      Nose: Nose normal.      Right Sinus: No frontal sinus tenderness.      Left Sinus: No maxillary sinus tenderness or frontal sinus tenderness.      Mouth/Throat:      Lips: Pink.      Mouth: Mucous membranes are moist.      Dentition: Normal dentition.      Tongue: No lesions.      Pharynx: Oropharynx is clear.      Tonsils: No tonsillar exudate.   Eyes:      General: Lids are normal.      Conjunctiva/sclera: Conjunctivae normal.      Pupils: Pupils are equal, round, and reactive to light.   Neck:      Thyroid: No thyroid mass, thyromegaly or thyroid tenderness.      Vascular: No carotid bruit.      Trachea: Trachea and phonation " normal. No tracheal tenderness.   Cardiovascular:      Rate and Rhythm: Normal rate and regular rhythm.      Pulses: Normal pulses.      Heart sounds: Normal heart sounds, S1 normal and S2 normal. No murmur heard.     Pulmonary:      Effort: Pulmonary effort is normal.      Breath sounds: Normal breath sounds and air entry.   Abdominal:      General: Bowel sounds are normal.      Palpations: Abdomen is soft.      Tenderness: There is no abdominal tenderness. There is no right CVA tenderness, left CVA tenderness, guarding or rebound. Negative signs include Plummer's sign, Rovsing's sign, McBurney's sign, psoas sign and obturator sign.   Musculoskeletal:      Cervical back: Neck supple.      Right lower leg: No edema.      Left lower leg: No edema.   Lymphadenopathy:      Cervical: No cervical adenopathy.      Right cervical: No superficial, deep or posterior cervical adenopathy.     Left cervical: No superficial, deep or posterior cervical adenopathy.   Skin:     General: Skin is warm and dry.      Capillary Refill: Capillary refill takes less than 2 seconds.   Neurological:      Mental Status: He is alert and oriented to person, place, and time.      Deep Tendon Reflexes:      Reflex Scores:       Patellar reflexes are 2+ on the right side and 2+ on the left side.  Psychiatric:         Attention and Perception: Attention and perception normal.         Mood and Affect: Mood and affect normal.         Speech: Speech normal.         Behavior: Behavior is cooperative.         Thought Content: Thought content normal.         Cognition and Memory: Cognition and memory normal.         Judgment: Judgment normal.     I was wearing a surgical mask and face shield during the entire office visit/encounter.     Result Review :                 Assessment and Plan    Diagnoses and all orders for this visit:    1. Preoperative clearance (Primary)    2. Acute pain of right knee    3. Bilateral impacted cerumen    Other orders  -      Cancel: Ear Cerumen Removal  -     Ear Cerumen Removal    1.  New preoperative surgical clearance for right knee pain: Nabor will be having right knee surgery performed on July 21,2021 at [facility] by Dr. Loy Zarate.  Nabor will have pre op testing tomorrow at [facility].  I will review testing after completion.  Will complete surgical clearance note at that time.  He is medically cleared for upcoming surgery pending any abnormalities with pre op testing.  2.  New bilateral cerumen impacted ears: Refused ear lavage today.  Will return to office in the next few weeks for ear lavage.      Follow Up   No follow-ups on file.  Patient was given instructions and counseling regarding his condition or for health maintenance advice. Please see specific information pulled into the AVS if appropriate.     [facility]  [address] [place] [id number]  Dept: [phone]  Dept Fax: [phone]  Loc: [phone]  Loc Fax: [phone]

## 2021-07-16 ENCOUNTER — PRE-ADMISSION TESTING (OUTPATIENT)
Dept: PREADMISSION TESTING | Facility: HOSPITAL | Age: 66
End: 2021-07-16

## 2021-07-16 VITALS
WEIGHT: 265.9 LBS | SYSTOLIC BLOOD PRESSURE: 120 MMHG | BODY MASS INDEX: 36.01 KG/M2 | RESPIRATION RATE: 16 BRPM | OXYGEN SATURATION: 96 % | HEART RATE: 60 BPM | DIASTOLIC BLOOD PRESSURE: 84 MMHG | HEIGHT: 72 IN

## 2021-07-16 DIAGNOSIS — S83.231A COMPLEX TEAR OF MEDIAL MENISCUS OF RIGHT KNEE AS CURRENT INJURY, INITIAL ENCOUNTER: ICD-10-CM

## 2021-07-16 LAB
ANION GAP SERPL CALCULATED.3IONS-SCNC: 10.7 MMOL/L (ref 5–15)
APTT PPP: 25.9 SECONDS (ref 24.3–38.1)
BASOPHILS # BLD AUTO: 0.06 10*3/MM3 (ref 0–0.2)
BASOPHILS NFR BLD AUTO: 0.9 % (ref 0–1.5)
BUN SERPL-MCNC: 22 MG/DL (ref 8–23)
BUN/CREAT SERPL: 17.6 (ref 7–25)
CALCIUM SPEC-SCNC: 9.4 MG/DL (ref 8.6–10.5)
CHLORIDE SERPL-SCNC: 102 MMOL/L (ref 98–107)
CO2 SERPL-SCNC: 24.3 MMOL/L (ref 22–29)
CREAT SERPL-MCNC: 1.25 MG/DL (ref 0.76–1.27)
DEPRECATED RDW RBC AUTO: 41.5 FL (ref 37–54)
EOSINOPHIL # BLD AUTO: 0.2 10*3/MM3 (ref 0–0.4)
EOSINOPHIL NFR BLD AUTO: 3 % (ref 0.3–6.2)
ERYTHROCYTE [DISTWIDTH] IN BLOOD BY AUTOMATED COUNT: 12.8 % (ref 12.3–15.4)
GFR SERPL CREATININE-BSD FRML MDRD: 58 ML/MIN/1.73
GLUCOSE SERPL-MCNC: 140 MG/DL (ref 65–99)
HBA1C MFR BLD: 5.7 % (ref 4.8–5.6)
HCT VFR BLD AUTO: 44.4 % (ref 37.5–51)
HGB BLD-MCNC: 14.8 G/DL (ref 13–17.7)
IMM GRANULOCYTES # BLD AUTO: 0.02 10*3/MM3 (ref 0–0.05)
IMM GRANULOCYTES NFR BLD AUTO: 0.3 % (ref 0–0.5)
INR PPP: 1.07 (ref 0.9–1.1)
LYMPHOCYTES # BLD AUTO: 2 10*3/MM3 (ref 0.7–3.1)
LYMPHOCYTES NFR BLD AUTO: 29.9 % (ref 19.6–45.3)
MCH RBC QN AUTO: 29.5 PG (ref 26.6–33)
MCHC RBC AUTO-ENTMCNC: 33.3 G/DL (ref 31.5–35.7)
MCV RBC AUTO: 88.4 FL (ref 79–97)
MONOCYTES # BLD AUTO: 0.61 10*3/MM3 (ref 0.1–0.9)
MONOCYTES NFR BLD AUTO: 9.1 % (ref 5–12)
NEUTROPHILS NFR BLD AUTO: 3.79 10*3/MM3 (ref 1.7–7)
NEUTROPHILS NFR BLD AUTO: 56.8 % (ref 42.7–76)
NRBC BLD AUTO-RTO: 0 /100 WBC (ref 0–0.2)
PLATELET # BLD AUTO: 191 10*3/MM3 (ref 140–450)
PMV BLD AUTO: 11 FL (ref 6–12)
POTASSIUM SERPL-SCNC: 4 MMOL/L (ref 3.5–5.2)
PROTHROMBIN TIME: 13.9 SECONDS (ref 12.1–15)
QT INTERVAL: 463 MS
RBC # BLD AUTO: 5.02 10*6/MM3 (ref 4.14–5.8)
SODIUM SERPL-SCNC: 137 MMOL/L (ref 136–145)
WBC # BLD AUTO: 6.68 10*3/MM3 (ref 3.4–10.8)

## 2021-07-16 PROCEDURE — 93005 ELECTROCARDIOGRAM TRACING: CPT

## 2021-07-16 PROCEDURE — 83036 HEMOGLOBIN GLYCOSYLATED A1C: CPT | Performed by: ORTHOPAEDIC SURGERY

## 2021-07-16 PROCEDURE — 85730 THROMBOPLASTIN TIME PARTIAL: CPT | Performed by: ORTHOPAEDIC SURGERY

## 2021-07-16 PROCEDURE — 85025 COMPLETE CBC W/AUTO DIFF WBC: CPT | Performed by: ORTHOPAEDIC SURGERY

## 2021-07-16 PROCEDURE — 85610 PROTHROMBIN TIME: CPT | Performed by: ORTHOPAEDIC SURGERY

## 2021-07-16 PROCEDURE — 36415 COLL VENOUS BLD VENIPUNCTURE: CPT

## 2021-07-16 PROCEDURE — 80048 BASIC METABOLIC PNL TOTAL CA: CPT | Performed by: ORTHOPAEDIC SURGERY

## 2021-07-16 PROCEDURE — 93010 ELECTROCARDIOGRAM REPORT: CPT | Performed by: INTERNAL MEDICINE

## 2021-07-16 NOTE — DISCHARGE INSTRUCTIONS
PRE-ADMISSION TESTING INSTRUCTIONS FOR ADULTS    Take these medications the morning of surgery with a small sip of water: nexium      No aspirin, advil, aleve, ibuprofen, naproxen, diet pills, decongestants, or herbal/vitamins for a week prior to surgery.    General Instructions:    • Do not eat solid food after midnight the night before surgery.  No gum, mints, or hard candy after midnight the night before surgery.  • You may drink clear liquids the day of surgery up until 2 hours before your arrival time.  • Clear liquids are liquids you can see through. Nothing RED in color.    Plain water    Sports drinks  Sodas     Gelatin (Jell-O)  Fruit juices without pulp such as white grape juice and apple juice  Popsicles that contain no fruit or yogurt  Tea or coffee (no cream or milk added)    • It is beneficial for you to have a clear drink that contains carbohydrates just before you leave your house and before your fasting time begins.  We suggest a 20 ounce bottle of Gatorade or Powerade for non-diabetic patients or a 20 ounce bottle of G2 or Powerade Zero for diabetic patients.     • Patients who avoid smoking, chewing tobacco and alcohol for 4 weeks prior to surgery have a reduced risk of post-operative complications.  If at all possible, quit smoking as many days before surgery as you can.    • Do not smoke, use chewing tobacco or drink alcohol the day of surgery    • Bring your C-PAP/ BI-PAP machine if you use one.  • Wear clean comfortable clothes and socks.  • Do not wear contact lenses, lotion, deodorant, or make-up.  Bring a case for your glasses if applicable. You may brush your teeth the morning of surgery.  • You may wear dentures/partials, do not put adhesive/glue on them.  • Bring crutches or walker if applicable.  • Leave all other jewelry and valuables at home.      Preventing a Surgical Site Infection:    • Shower the night before and on the morning of surgery using the chlorhexidine soap you were  given.  Use a clean washcloth with the soap.  Place clean sheets on your bed after showering the night before surgery. Do not use the CHG soap on your hair, face, or private areas. Wash your body gently for five (5) minutes. Do not scrub your skin.  Dry with a clean towel and dress in clean clothing.    • Do not shave the surgical area for 10 days-2 weeks prior to surgery  because the razor can irritate skin and make it easier to develop an infection.  • Make sure you, your family, and all healthcare providers clean their hands with soap and water or an alcohol based hand  before caring for you or your wound.      Day of surgery:    Your surgeon’s office will advise you of your arrival time for the day of surgery.    Upon arrival, a Pre-op nurse and Anesthesia provider will review your health history, obtain vital signs, and answer questions you may have.  The only belongings needed at this time will be your home medications and if applicable your C-PAP/BI-PAP machine.  If you are staying overnight your family can leave the rest of your belongings in the car and bring them to your room later.  A Pre-op nurse will start an IV and you may receive medication in preparation for surgery, including something to help you relax.  Your family will be able to see you in the Pre-op area.  While you are in surgery your family should notify the waiting room  if they leave the waiting room area and provide a contact phone number.    IF you have any questions, you can call the Pre-Admission Department at [phone] or your surgeon's office.  Notify your surgeon if  you become sick, have a fever, productive cough, or cannot be here the day of surgery    Please be aware that surgery does come with discomfort.  We want to make every effort to control your discomfort so please discuss any uncontrolled symptoms with your nurse.   Your doctor will most likely have prescribed pain medications.      If you are  going home after surgery, you will receive individualized written care instructions before being discharged.  A responsible adult (over the age of 18) must drive you to and from the hospital on the day of your surgery and stay with you for 24 hours after anesthesia.    If you are staying overnight following surgery, you will be transported to your hospital room following the recovery period.  [facility] has all private rooms.    You may receive a survey regarding the care you received. Your feedback is very important and will be used to collect the necessary data to help us to continue to provide excellent care.     Deductibles and co-payments are collected on the day of service. Please be prepared to pay the required co-pay, deductible or deposit on the day of service as defined by your plan.

## 2021-07-16 NOTE — PAT
Pt here for PAT visit.  Pre-op tests completed, chg soap given, and instructions reviewed.  Instructed clears until 2 hrs prior to arrival time and to bring CPAP, voiced understanding. COVID test to be done day of surgery. Getting medical clearance from PCP.

## 2021-07-20 ENCOUNTER — HOSPITAL ENCOUNTER (OUTPATIENT)
Dept: GENERAL RADIOLOGY | Facility: HOSPITAL | Age: 66
Discharge: HOME | End: 2021-07-20

## 2021-07-20 ENCOUNTER — ANESTHESIA EVENT (OUTPATIENT)
Dept: PERIOP | Facility: HOSPITAL | Age: 66
End: 2021-07-20

## 2021-07-21 ENCOUNTER — HOSPITAL ENCOUNTER (OUTPATIENT)
Facility: HOSPITAL | Age: 66
Setting detail: HOSPITAL OUTPATIENT SURGERY
Discharge: HOME | End: 2021-07-21
Attending: ORTHOPAEDIC SURGERY | Admitting: ORTHOPAEDIC SURGERY

## 2021-07-21 ENCOUNTER — ANESTHESIA (OUTPATIENT)
Dept: PERIOP | Facility: HOSPITAL | Age: 66
End: 2021-07-21

## 2021-07-21 VITALS
RESPIRATION RATE: 13 BRPM | OXYGEN SATURATION: 93 % | HEART RATE: 62 BPM | BODY MASS INDEX: 35.56 KG/M2 | DIASTOLIC BLOOD PRESSURE: 62 MMHG | WEIGHT: 258.6 LBS | SYSTOLIC BLOOD PRESSURE: 102 MMHG | TEMPERATURE: 98 F

## 2021-07-21 DIAGNOSIS — S83.231A COMPLEX TEAR OF MEDIAL MENISCUS OF RIGHT KNEE AS CURRENT INJURY, INITIAL ENCOUNTER: ICD-10-CM

## 2021-07-21 LAB — SARS-COV-2 RNA PNL SPEC NAA+PROBE: NOT DETECTED

## 2021-07-21 PROCEDURE — 25010000002 MIDAZOLAM PER 1MG: Performed by: NURSE ANESTHETIST, CERTIFIED REGISTERED

## 2021-07-21 PROCEDURE — 29881 ARTHRS KNE SRG MNISECTMY M/L: CPT | Performed by: SPECIALIST/TECHNOLOGIST, OTHER

## 2021-07-21 PROCEDURE — 25010000002 MORPHINE SULFATE (PF) 2 MG/ML SOLUTION 1 ML CARTRIDGE: Performed by: ORTHOPAEDIC SURGERY

## 2021-07-21 PROCEDURE — 25010000002 PROPOFOL 10 MG/ML EMULSION: Performed by: ANESTHESIOLOGY

## 2021-07-21 PROCEDURE — 25010000002 FENTANYL CITRATE (PF) 50 MCG/ML SOLUTION: Performed by: ANESTHESIOLOGY

## 2021-07-21 PROCEDURE — 87635 SARS-COV-2 COVID-19 AMP PRB: CPT | Performed by: ORTHOPAEDIC SURGERY

## 2021-07-21 PROCEDURE — 25010000002 ONDANSETRON PER 1 MG: Performed by: NURSE ANESTHETIST, CERTIFIED REGISTERED

## 2021-07-21 PROCEDURE — C9803 HOPD COVID-19 SPEC COLLECT: HCPCS

## 2021-07-21 PROCEDURE — 25010000002 TRIAMCINOLONE PER 10 MG: Performed by: ORTHOPAEDIC SURGERY

## 2021-07-21 PROCEDURE — 25010000002 DIPHENHYDRAMINE PER 50 MG: Performed by: NURSE ANESTHETIST, CERTIFIED REGISTERED

## 2021-07-21 PROCEDURE — 29881 ARTHRS KNE SRG MNISECTMY M/L: CPT | Performed by: ORTHOPAEDIC SURGERY

## 2021-07-21 PROCEDURE — 25010000003 LIDOCAINE 1 % SOLUTION 2 ML VIAL: Performed by: ORTHOPAEDIC SURGERY

## 2021-07-21 PROCEDURE — 25010000002 DEXAMETHASONE PER 1 MG: Performed by: NURSE ANESTHETIST, CERTIFIED REGISTERED

## 2021-07-21 RX ORDER — OXYCODONE HYDROCHLORIDE AND ACETAMINOPHEN 5; 325 MG/1; MG/1
1 TABLET ORAL ONCE AS NEEDED
Status: DISCONTINUED | OUTPATIENT
Start: 2021-07-21 | End: 2021-07-21 | Stop reason: HOSPADM

## 2021-07-21 RX ORDER — SODIUM CHLORIDE 9 MG/ML
40 INJECTION, SOLUTION INTRAVENOUS AS NEEDED
Status: DISCONTINUED | OUTPATIENT
Start: 2021-07-21 | End: 2021-07-21 | Stop reason: HOSPADM

## 2021-07-21 RX ORDER — HYDROMORPHONE HYDROCHLORIDE 1 MG/ML
0.5 INJECTION, SOLUTION INTRAMUSCULAR; INTRAVENOUS; SUBCUTANEOUS
Status: DISCONTINUED | OUTPATIENT
Start: 2021-07-21 | End: 2021-07-21 | Stop reason: HOSPADM

## 2021-07-21 RX ORDER — HYDROCODONE BITARTRATE AND ACETAMINOPHEN 5; 325 MG/1; MG/1
1-2 TABLET ORAL EVERY 4 HOURS PRN
Qty: 30 TABLET | Refills: 0 | Status: SHIPPED | OUTPATIENT
Start: 2021-07-21 | End: 2021-08-26

## 2021-07-21 RX ORDER — ONDANSETRON 4 MG/1
4 TABLET, FILM COATED ORAL EVERY 8 HOURS PRN
Qty: 20 TABLET | Refills: 0 | Status: SHIPPED | OUTPATIENT
Start: 2021-07-21 | End: 2021-08-26

## 2021-07-21 RX ORDER — FENTANYL CITRATE 50 UG/ML
INJECTION, SOLUTION INTRAMUSCULAR; INTRAVENOUS AS NEEDED
Status: DISCONTINUED | OUTPATIENT
Start: 2021-07-21 | End: 2021-07-21 | Stop reason: SURG

## 2021-07-21 RX ORDER — ONDANSETRON 2 MG/ML
4 INJECTION INTRAMUSCULAR; INTRAVENOUS ONCE AS NEEDED
Status: DISCONTINUED | OUTPATIENT
Start: 2021-07-21 | End: 2021-07-21 | Stop reason: HOSPADM

## 2021-07-21 RX ORDER — KETAMINE HYDROCHLORIDE 10 MG/ML
INJECTION INTRAMUSCULAR; INTRAVENOUS AS NEEDED
Status: DISCONTINUED | OUTPATIENT
Start: 2021-07-21 | End: 2021-07-21 | Stop reason: SURG

## 2021-07-21 RX ORDER — HYDROMORPHONE HYDROCHLORIDE 1 MG/ML
1 INJECTION, SOLUTION INTRAMUSCULAR; INTRAVENOUS; SUBCUTANEOUS
Status: DISCONTINUED | OUTPATIENT
Start: 2021-07-21 | End: 2021-07-21 | Stop reason: HOSPADM

## 2021-07-21 RX ORDER — MAGNESIUM HYDROXIDE 1200 MG/15ML
LIQUID ORAL AS NEEDED
Status: DISCONTINUED | OUTPATIENT
Start: 2021-07-21 | End: 2021-07-21 | Stop reason: HOSPADM

## 2021-07-21 RX ORDER — TRIAMCINOLONE ACETONIDE 40 MG/ML
INJECTION, SUSPENSION INTRA-ARTICULAR; INTRAMUSCULAR AS NEEDED
Status: DISCONTINUED | OUTPATIENT
Start: 2021-07-21 | End: 2021-07-21 | Stop reason: HOSPADM

## 2021-07-21 RX ORDER — SODIUM CHLORIDE 0.9 % (FLUSH) 0.9 %
10 SYRINGE (ML) INJECTION EVERY 12 HOURS SCHEDULED
Status: DISCONTINUED | OUTPATIENT
Start: 2021-07-21 | End: 2021-07-21 | Stop reason: HOSPADM

## 2021-07-21 RX ORDER — PREGABALIN 75 MG/1
150 CAPSULE ORAL ONCE
Status: COMPLETED | OUTPATIENT
Start: 2021-07-21 | End: 2021-07-21

## 2021-07-21 RX ORDER — ACETAMINOPHEN 500 MG
1000 TABLET ORAL ONCE
Status: COMPLETED | OUTPATIENT
Start: 2021-07-21 | End: 2021-07-21

## 2021-07-21 RX ORDER — DEXAMETHASONE SODIUM PHOSPHATE 4 MG/ML
8 INJECTION, SOLUTION INTRA-ARTICULAR; INTRALESIONAL; INTRAMUSCULAR; INTRAVENOUS; SOFT TISSUE ONCE AS NEEDED
Status: COMPLETED | OUTPATIENT
Start: 2021-07-21 | End: 2021-07-21

## 2021-07-21 RX ORDER — VIT C/B6/B5/MAGNESIUM/HERB 173 50-5-6-5MG
CAPSULE ORAL
COMMUNITY

## 2021-07-21 RX ORDER — ONDANSETRON 2 MG/ML
4 INJECTION INTRAMUSCULAR; INTRAVENOUS ONCE AS NEEDED
Status: COMPLETED | OUTPATIENT
Start: 2021-07-21 | End: 2021-07-21

## 2021-07-21 RX ORDER — PROPOFOL 10 MG/ML
VIAL (ML) INTRAVENOUS AS NEEDED
Status: DISCONTINUED | OUTPATIENT
Start: 2021-07-21 | End: 2021-07-21 | Stop reason: SURG

## 2021-07-21 RX ORDER — SODIUM CHLORIDE, SODIUM LACTATE, POTASSIUM CHLORIDE, CALCIUM CHLORIDE 600; 310; 30; 20 MG/100ML; MG/100ML; MG/100ML; MG/100ML
9 INJECTION, SOLUTION INTRAVENOUS CONTINUOUS PRN
Status: DISCONTINUED | OUTPATIENT
Start: 2021-07-21 | End: 2021-07-21 | Stop reason: HOSPADM

## 2021-07-21 RX ORDER — MIDAZOLAM HYDROCHLORIDE 2 MG/2ML
0.5 INJECTION, SOLUTION INTRAMUSCULAR; INTRAVENOUS
Status: DISCONTINUED | OUTPATIENT
Start: 2021-07-21 | End: 2021-07-21 | Stop reason: HOSPADM

## 2021-07-21 RX ORDER — SODIUM CHLORIDE, SODIUM LACTATE, POTASSIUM CHLORIDE, CALCIUM CHLORIDE 600; 310; 30; 20 MG/100ML; MG/100ML; MG/100ML; MG/100ML
100 INJECTION, SOLUTION INTRAVENOUS CONTINUOUS
Status: DISCONTINUED | OUTPATIENT
Start: 2021-07-21 | End: 2021-07-21 | Stop reason: HOSPADM

## 2021-07-21 RX ORDER — SODIUM CHLORIDE 0.9 % (FLUSH) 0.9 %
10 SYRINGE (ML) INJECTION AS NEEDED
Status: DISCONTINUED | OUTPATIENT
Start: 2021-07-21 | End: 2021-07-21 | Stop reason: HOSPADM

## 2021-07-21 RX ORDER — MELOXICAM 7.5 MG/1
15 TABLET ORAL ONCE
Status: COMPLETED | OUTPATIENT
Start: 2021-07-21 | End: 2021-07-21

## 2021-07-21 RX ORDER — DIPHENHYDRAMINE HYDROCHLORIDE 50 MG/ML
12.5 INJECTION INTRAMUSCULAR; INTRAVENOUS ONCE
Status: COMPLETED | OUTPATIENT
Start: 2021-07-21 | End: 2021-07-21

## 2021-07-21 RX ORDER — CEFAZOLIN SODIUM IN 0.9 % NACL 3 G/100 ML
3 INTRAVENOUS SOLUTION, PIGGYBACK (ML) INTRAVENOUS ONCE
Status: COMPLETED | OUTPATIENT
Start: 2021-07-21 | End: 2021-07-21

## 2021-07-21 RX ORDER — LIDOCAINE HYDROCHLORIDE AND EPINEPHRINE 10; 10 MG/ML; UG/ML
INJECTION, SOLUTION INFILTRATION; PERINEURAL AS NEEDED
Status: DISCONTINUED | OUTPATIENT
Start: 2021-07-21 | End: 2021-07-21 | Stop reason: HOSPADM

## 2021-07-21 RX ORDER — LIDOCAINE HYDROCHLORIDE 10 MG/ML
0.5 INJECTION, SOLUTION EPIDURAL; INFILTRATION; INTRACAUDAL; PERINEURAL ONCE AS NEEDED
Status: DISCONTINUED | OUTPATIENT
Start: 2021-07-21 | End: 2021-07-21 | Stop reason: HOSPADM

## 2021-07-21 RX ADMIN — DIPHENHYDRAMINE HYDROCHLORIDE 12.5 MG: 50 INJECTION INTRAMUSCULAR; INTRAVENOUS at 10:48

## 2021-07-21 RX ADMIN — ONDANSETRON 4 MG: 2 INJECTION INTRAMUSCULAR; INTRAVENOUS at 10:48

## 2021-07-21 RX ADMIN — FENTANYL CITRATE 50 MCG: 50 INJECTION INTRAMUSCULAR; INTRAVENOUS at 12:52

## 2021-07-21 RX ADMIN — PROPOFOL 150 MG: 10 INJECTION, EMULSION INTRAVENOUS at 12:54

## 2021-07-21 RX ADMIN — PREGABALIN 150 MG: 75 CAPSULE ORAL at 10:22

## 2021-07-21 RX ADMIN — MIDAZOLAM HYDROCHLORIDE 0.5 MG: 1 INJECTION, SOLUTION INTRAMUSCULAR; INTRAVENOUS at 12:45

## 2021-07-21 RX ADMIN — SODIUM CHLORIDE, POTASSIUM CHLORIDE, SODIUM LACTATE AND CALCIUM CHLORIDE 9 ML/HR: 600; 310; 30; 20 INJECTION, SOLUTION INTRAVENOUS at 10:30

## 2021-07-21 RX ADMIN — ACETAMINOPHEN 1000 MG: 500 TABLET, FILM COATED ORAL at 10:22

## 2021-07-21 RX ADMIN — DEXAMETHASONE SODIUM PHOSPHATE 8 MG: 4 INJECTION, SOLUTION INTRAMUSCULAR; INTRAVENOUS at 10:48

## 2021-07-21 RX ADMIN — KETAMINE HYDROCHLORIDE 30 MG: 10 INJECTION, SOLUTION INTRAMUSCULAR; INTRAVENOUS at 12:57

## 2021-07-21 RX ADMIN — MELOXICAM 15 MG: 7.5 TABLET ORAL at 10:21

## 2021-07-21 RX ADMIN — CEFAZOLIN 3 G: 1 INJECTION, POWDER, FOR SOLUTION INTRAMUSCULAR; INTRAVENOUS; PARENTERAL at 12:59

## 2021-07-21 NOTE — H&P
Orthopedic H&P    Patient ID: Nabor Muhammad Dr. is a 65 y.o. male.     Chief Complaint: Right knee pain, medial meniscal tear     History of Present Illness  Nabor Muhammad Dr. presents for surgical treatment of right knee meniscal tear and right knee pain starting April 2021 while weedwhacking. He reports history of right knee arthroscopic medial meniscectomy. The pain is localized to the medial and anterolateral without radiation. His pain is rated 3-4/10 in severity today and is aching in quality.  The pain is aggravated by increased activities but alleviated by rest.  Denies any locking, catching, or giving way of right knee. Denies swelling, tingling, or numbness.     No current facility-administered medications on file prior to encounter.     Current Outpatient Medications on File Prior to Encounter   Medication Sig Dispense Refill   • esomeprazole (nexIUM) 40 MG capsule Take 40 mg by mouth Every Morning Before Breakfast.     • finasteride (PROSCAR) 5 MG tablet Take 1 tablet by mouth Daily. 30 tablet 0   • Multiple Vitamins-Minerals (ICAPS AREDS 2 PO) Take  by mouth.     • olmesartan-hydrochlorothiazide (BENICAR HCT) 40-25 MG per tablet Take 1 tablet by mouth.     • tamsulosin (FLOMAX) 0.4 MG capsule 24 hr capsule 2 po qd (Patient taking differently: Take 2 capsules by mouth Daily. 2 po qd) 30 capsule    • Turmeric 500 MG capsule Take  by mouth.     • vitamin D3 125 MCG (5000 UT) capsule capsule Take 5,000 Units by mouth Daily.       Allergies   Allergen Reactions   • Codeine GI Intolerance        Social History            Occupational History   • Not on file   Tobacco Use   • Smoking status: Never Smoker   • Smokeless tobacco: Never Used   Vaping Use   • Vaping Use: Never used   Substance and Sexual Activity   • Alcohol use: Yes       Comment: occ   • Drug use: No   • Sexual activity: Defer      Medical History        Past Medical History:   Diagnosis Date   • Arthritis     • Hypertension     • Lumbosacral  disc disease     • OA (osteoarthritis)     • YANETH (obstructive sleep apnea)           Surgical History         Past Surgical History:   Procedure Laterality Date   • CHOLECYSTECTOMY       • FEMUR CLOSED REDUCTION Left 1970   • SHOULDER SURGERY Right 1998   • TONSILLECTOMY AND ADENOIDECTOMY       • WRIST GANGLION EXCISION Bilateral       R-1990 L-1996                  Family History   Problem Relation Age of Onset   • COPD Father              Review of Systems           Objective:  Vitals:    07/21/21 1005   BP: 132/85   BP Location: Right arm   Patient Position: Lying   Pulse: 60   Resp: 22   Temp: 97.8 °F (36.6 °C)   TempSrc: Oral   SpO2: 96%   Weight: 117 kg (258 lb 9.6 oz)          Body mass index is 35.76 kg/m².  Physical Exam     Vital signs reviewed.   General: No acute distress, alert and oriented  Eyes: conjunctiva clear; pupils equally round and reactive  ENT: external ears and nose atraumatic; oropharynx clear  CV: no peripheral edema  Resp: normal respiratory effort  Skin: no rashes or wounds; normal turgor  Psych: mood and affect appropriate; recent and remote memory intact        Objective     Ortho Exam      Right Knee-     ROM 0-130 degrees  4+/5 on flexion  4+/5 on extension  Maximal tenderness medial joint line  Diana- positive medial joint line     Effusion- minimal   Grade 1A Lachman  Anterior drawer- negative  Posterior drawer- negative   Stable opening on varus and valgus stress at 0 and 30  Active patellar compression test- negative      Log roll-  negative  Stinchfield-  negative     Positive sensation light tough all distributions symmetric to contralateral side  Brisk cap refill all digits  1+ dorsalis pedis pulse              Imaging:  MRI Knee Right without Contrast  IMPRESSION:  New 1 x 1.6 cm focus of subchondral marrow edema and cystic change posterior nonweightbearing medial femoral condyle likely representing an area of developing chondromalacia and enthesopathic  change.     Morphologic changes within the posterior horn medial meniscus compatible with partial meniscectomy. Linear signal abnormality within the posterior body segment with a flap fragment extending into the posterior meniscal tibial gutter compatible with a  flap tear estimated at 1.4 cm in length. This appears new from April 2013.     Small knee effusion mild generalized soft tissue swelling and edema about the knee.     Diffuse medial joint articular cartilage thinning with no areas of high-grade chondromalacia.     Signer Name: HOLLAND Cash MD   Signed: 7/1/2021 1:27 PM   [facility]     Review of MRI right knee including review of images as well as radiology report indicates region of subchondral marrow edema and cystic change in the posterior portion of the medial femoral condyle with meniscal deficiency noted posteriorly consistent with prior partial meniscectomy with a flap fragment extending into the posterior meniscal tibial gutter and small associated effusion.  Mild chondral thinning throughout the medial compartment.  No evidence of free loose bodies     Right Knee X-Ray  Indication: Pain     AP, Lateral, and Higden views     Findings:  No fracture  No bony lesion  Normal soft tissues  Moderate medial compartment joint space narrowing with no evidence of bone-on-bone articulation, minimal osteophytes along medial compartment appreciated     No prior studies were available for comparison.        Assessment:        Assessment       1. Pain    2. Complex tear of medial meniscus of right knee as current injury, initial encounter    3. Chondromalacia of right knee             Plan:        Plan          1. Discussed treatment options with patient at today's visit including arthroscopic meniscus repair versus meniscectomy or total knee arthroplasty. His chondral wear is not so significantly advanced at this point time but that that we feel an arthroplasty procedure is  warranted, patient is having mechanical symptoms and sharp pain along his medial joint line, prior history of meniscal injury in this area.  Risk, benefits, alternatives  were discussed and patient does wish to proceed with right knee arthroscopic treatment with meniscal debridement versus repair.  2. We will plan on proceeding with surgery at patient's request for a right knee arthroscopy, meniscal and cartilage surgery as indicated.  I reviewed details of procedure with patient today as well as a model of a knee and discussed risks, benefits, and alternatives of the procedure with the risks including but not limited to neurovascular damage, bleeding, infection, chronic pain, worsening of pain, chondrolysis, recurrent meniscal tear, swelling, loss of motion, weakness, stiffness, instability, DVT, pulmonary embolus, death, stroke, complex regional pain syndrome, and need for additional procedures.  Patient understood all these and had all questions answered today.  No guarantees were given regarding results of surgery.     3. Patient denies past medical history of blood clots, cardiac issues, or diabetes mellitus.         Nabor Muhammad Dr. was in agreement with plan and had all questions answered.          Dictated utilizing Dragon dictation

## 2021-07-21 NOTE — ANESTHESIA PREPROCEDURE EVALUATION
Anesthesia Evaluation     Patient summary reviewed and Nursing notes reviewed   no history of anesthetic complications:  NPO Solid Status: > 8 hours  NPO Liquid Status: > 4 hours           Airway   Mallampati: II  TM distance: >3 FB  Neck ROM: full  No difficulty expected  Dental - normal exam     Pulmonary - normal exam    breath sounds clear to auscultation  (+) sleep apnea on CPAP,   Cardiovascular - normal exam    ECG reviewed  Rhythm: regular  Rate: normal    (+) hypertension well controlled 2 medications or greater,       Neuro/Psych- negative ROS  GI/Hepatic/Renal/Endo    (+) obesity,  GERD well controlled,      Musculoskeletal     (+) back pain (numb left lateral thigh), chronic pain,   Abdominal   (+) obese,    Substance History   (+) alcohol use (occ),      OB/GYN          Other   arthritis,                      Anesthesia Plan    ASA 2     general     intravenous induction     Anesthetic plan, all risks, benefits, and alternatives have been provided, discussed and informed consent has been obtained with: patient.  Use of blood products discussed with patient  Consented to blood products.

## 2021-07-21 NOTE — ANESTHESIA PROCEDURE NOTES
Airway  Urgency: elective    Date/Time: 7/21/2021 12:55 PM  Airway not difficult    General Information and Staff    Patient location during procedure: OR  Anesthesiologist: Faith Troy MD    Indications and Patient Condition  Indications for airway management: airway protection    Preoxygenated: yes  MILS maintained throughout  Mask difficulty assessment: 1 - vent by mask    Final Airway Details  Final airway type: supraglottic airway      Successful airway: unique  Size 4    Number of attempts at approach: 1  Assessment: lips, teeth, and gum same as pre-op and atraumatic intubation

## 2021-07-21 NOTE — OP NOTE
DATE OF OPERATION: 7/21/2021    PREOPERATIVE DIAGNOSIS:   1. Right knee medial meniscal tear.     POSTOPERATIVE DIAGNOSES:    1. Right knee medial meniscal tear.    2.  Right knee medial compartment chondromalacia     PROCEDURES PERFORMED:    1. Right knee arthroscopy with partial medial meniscectomy.      SURGEON: Terry Zarate MD    ASSISTANT: Wilfredo Klein, CSA-held and positioned camera, closure, dressing application  ANESTHESIA: General endotracheal anesthesia with local    ESTIMATED BLOOD LOSS: minimal    URINE OUTPUT: Not recorded.     FLUIDS: Per Anesthesia.     COMPLICATIONS: None.     SPECIMENS: None.     DRAINS: None.     Tourniquet Times:     Inflated: 7/21/2021  1:12 PM     Deflated: 7/21/2021  1:34 PM    EXAMINATION UNDER ANESTHESIA: Passive range of motion of the right knee 0° to 125°, moderate effusion noted, grade 1A Lachman, negative anterior and posterior drawer, stable to varus and valgus stress 0° and 30°, midline patellar tracking, 1-quadrant medial and lateral patellar laxity.     ARTHROSCOPY FINDINGS:    1. Suprapatellar pouch- free of loose bodies.    2. Medial and lateral gutters- free of loose bodies.    3. Patellofemoral joint: Minimal chondral wear.    4. Medial compartment: Grade 3/4 chondral wear far lateral aspect medial femoral condyle extending into the central weightbearing portion at the margin, grade 2/3 chondral wear central medial tibial plateau, complex tear with evidence of meniscal deficiency from prior partial meniscectomy with additional tearing at margins of prior meniscectomy in the posterior horn with small flap extending into the inferior tibial gutter, tear involving approximately 20% residual meniscal volume, meniscal root intact.    5. Notch: ACL intact. PCL intact.    6. Lateral compartment: No evidence of meniscal or articular cartilage pathology.     INDICATIONS FOR PROCEDURE: The patient is a pleasant 65 y.o. male with significant history of pain in right knee  with history of prior partial medial meniscectomy. Given persistence of pain, failure of conservative treatment, as well as MRI findings consistent with the above-mentioned diagnosis the patient elected to proceed with the above-mentioned procedure. Patient had also failed conservative treatment. Patient was explained details of the procedure, as well as risks, benefits, and alternatives as documented on the history and physical, and had all questions answered prior to signing the operative consent form. No guarantees were given in regard to the results of the surgery.     DESCRIPTION OF PROCEDURE: The patient was seen, evaluated, and cleared for surgery by Anesthesia. Patient was met in the preoperative holding area. The operative site was marked, consent was reviewed, history and physical was updated, and preoperative labs were reviewed. The patient was then taken to the operating room and placed in a supine position on a regular OR table. After successful intubation per Anesthesia, an examination under anesthesia was carried out at this point in time. A nonsterile tourniquet was applied to the right lower extremity. The right leg was placed in a leg massey, and the contralateral leg placed in stirrups. The patient was secured to table with a waist strap. All bony prominences were well padded. The right lower extremity was then sterilely prepped and draped in a standard fashion.     A formal timeout was completed, including confirmation of history and physical, operative consent, surgical site, patient identification number, and preoperative antibiotic administration. The right leg was then exsanguinated and the tourniquet inflated to 300 mmHg. The procedure was begun with establishing a standard anterolateral portal with a #11 blade followed by insertion of a blunt trocar and cannula. The scope was then inserted at this point in time. Anteromedial portal was then created with spinal needle using outside-in  technique. A probe was then used to complete a diagnostic arthroscopic exam with findings as noted above.     Attention was then turned to the medial meniscal tear with a combination of a handheld biter, shaver, and ablation wand used to complete a partial medial meniscectomy. Peripheral rim was intact following the meniscectomy, as well as the posterior root and anterior horn. Total meniscal debridement of approximately 20% of the meniscal volume was completed at this time. A probe was reinserted. There was noted to be a smooth stable rim of meniscus with no residual tear noted.     The knee was then thoroughly irrigated with normal saline using a metal cannula and fluid from the scope. The fluid was then evacuated from the knee with suction at this point in time, as well as direct pressure. All arthroscopic instruments were removed at this point in time. The wounds were then closed with 2-0 nylon in interrupted fashion. Injection of 8 mL of 1% lidocaine plain, 40 mg Kenalog, and 2 mg of morphine were then injected into the knee at this point. The wounds were then dressed with Xeroform gauze, 4 x 4's, Tegaderm, ABD pad, Webril, Ace wrap, ice pack.   At the end of the procedure all lap, needle, and sponge counts were correct x2. The patient had brisk capillary refill to all digits of the right lower extremity. Compartments were soft and easily compressible at the end of the procedure.     DISPOSITION: The patient was extubated per Anesthesia and taken to the recovery room in stable condition. Will be discharged home in the care of family. Follow up in 1 week for a wound check. Weight bear as tolerated to the right lower extremity. Discharge with Virgilina for pain control and Zofran for nausea. Discussed results of the procedure immediately postoperatively with the patient's family, and they had all questions answered at that time.

## 2021-07-21 NOTE — ANESTHESIA POSTPROCEDURE EVALUATION
Patient: Nabor Muhammad Dr.    Procedure Summary     Date: 07/21/21 Room / Location:  [facility] /  [facility]    Anesthesia Start: 1249 Anesthesia Stop: 1348    Procedure: KNEE PARTIAL MEDIAL MENISCECTOMY, (Right Knee) Diagnosis:       Complex tear of medial meniscus of right knee as current injury, initial encounter      (Complex tear of medial meniscus of right knee as current injury, initial encounter [S83.231A])    Surgeons: Terry Zarate MD Provider: Faith Troy MD    Anesthesia Type: general ASA Status: 2          Anesthesia Type: general    Vitals  Vitals Value Taken Time   /75 07/21/21 1445   Temp 98.2 °F (36.8 °C) 07/21/21 1348   Pulse 62 07/21/21 1445   Resp 14 07/21/21 1445   SpO2 96 % 07/21/21 1445           Post Anesthesia Care and Evaluation    Patient location during evaluation: bedside  Patient participation: complete - patient participated  Level of consciousness: awake and alert  Pain score: 0  Pain management: adequate  Airway patency: patent  Anesthetic complications: No anesthetic complications    Cardiovascular status: acceptable  Respiratory status: acceptable  Hydration status: acceptable

## 2021-08-03 ENCOUNTER — OFFICE VISIT (OUTPATIENT)
Dept: ORTHOPEDIC SURGERY | Facility: CLINIC | Age: 66
End: 2021-08-03

## 2021-08-03 VITALS — WEIGHT: 257.94 LBS | BODY MASS INDEX: 34.94 KG/M2 | HEIGHT: 72 IN

## 2021-08-03 DIAGNOSIS — Z98.890 S/P ARTHROSCOPIC PARTIAL MEDIAL MENISCECTOMY: Primary | ICD-10-CM

## 2021-08-03 PROCEDURE — 99024 POSTOP FOLLOW-UP VISIT: CPT | Performed by: ORTHOPAEDIC SURGERY

## 2021-08-03 NOTE — PROGRESS NOTES
CC: Follow-up status post right knee arthroscopy with partial medial meniscectomy, DOS 7/21/2021  Interval history: Patient returns to clinic today stating knee is doing fairly well, still does have some mild pain along medial joint line and medial retinaculum. No fevers, chills or sweats. No drainage from dressing noted. Weight-bearing as tolerated on right lower extremity . Denies numbness or tingling to right leg. No fevers chills or sweats.     Exam: Right knee-portal sites clean dry and intact, sutures intact. Knee range of motion 0-125°. mild effusion. Positive sensation light touch all distributions right foot symmetric to the contralateral side, brisk cap refill all digits, 2+ dorsalis pedis pulse right foot. No calf pain, negative Jerson's sign bilateral lower extremities.      Impression: Status post right knee arthroscopy with partial medial meniscectomy     Plan:  1. Weight-bear as tolerated. Avoid impact loading activities including jogging and running until follow up.   2. Sutures removed today, steri strips placed.  3. Follow-up in 3 weeks for re-evaluation. Continue to work on ROM and strengthening. Ice for swelling. All questions answered

## 2021-08-05 DIAGNOSIS — Z00.00 ANNUAL PHYSICAL EXAM: Primary | ICD-10-CM

## 2021-08-05 DIAGNOSIS — E55.9 VITAMIN D DEFICIENCY: ICD-10-CM

## 2021-08-05 DIAGNOSIS — N40.1 BENIGN PROSTATIC HYPERPLASIA WITH LOWER URINARY TRACT SYMPTOMS, SYMPTOM DETAILS UNSPECIFIED: ICD-10-CM

## 2021-08-05 DIAGNOSIS — I10 ESSENTIAL HYPERTENSION: ICD-10-CM

## 2021-08-05 DIAGNOSIS — Z12.5 SCREENING FOR MALIGNANT NEOPLASM OF PROSTATE: ICD-10-CM

## 2021-08-05 DIAGNOSIS — U07.1 COVID-19 VIRUS INFECTION: ICD-10-CM

## 2021-08-07 LAB
25(OH)D3+25(OH)D2 SERPL-MCNC: 41.6 NG/ML (ref 30–100)
ALBUMIN SERPL-MCNC: 4.5 G/DL (ref 3.5–5.2)
ALBUMIN/GLOB SERPL: 1.8 G/DL
ALP SERPL-CCNC: 81 U/L (ref 39–117)
ALT SERPL-CCNC: 26 U/L (ref 1–41)
AST SERPL-CCNC: 26 U/L (ref 1–40)
BASOPHILS # BLD AUTO: 0.08 10*3/MM3 (ref 0–0.2)
BASOPHILS NFR BLD AUTO: 1.1 % (ref 0–1.5)
BILIRUB SERPL-MCNC: 0.8 MG/DL (ref 0–1.2)
BUN SERPL-MCNC: 20 MG/DL (ref 8–23)
BUN/CREAT SERPL: 16.9 (ref 7–25)
CALCIUM SERPL-MCNC: 9.8 MG/DL (ref 8.6–10.5)
CHLORIDE SERPL-SCNC: 101 MMOL/L (ref 98–107)
CHOLEST SERPL-MCNC: 180 MG/DL (ref 0–200)
CO2 SERPL-SCNC: 26 MMOL/L (ref 22–29)
CREAT SERPL-MCNC: 1.18 MG/DL (ref 0.76–1.27)
EOSINOPHIL # BLD AUTO: 0.14 10*3/MM3 (ref 0–0.4)
EOSINOPHIL NFR BLD AUTO: 2 % (ref 0.3–6.2)
ERYTHROCYTE [DISTWIDTH] IN BLOOD BY AUTOMATED COUNT: 13.1 % (ref 12.3–15.4)
GLOBULIN SER CALC-MCNC: 2.5 GM/DL
GLUCOSE SERPL-MCNC: 88 MG/DL (ref 65–99)
HCT VFR BLD AUTO: 45.1 % (ref 37.5–51)
HDLC SERPL-MCNC: 52 MG/DL (ref 40–60)
HGB BLD-MCNC: 15.1 G/DL (ref 13–17.7)
IMM GRANULOCYTES # BLD AUTO: 0.01 10*3/MM3 (ref 0–0.05)
IMM GRANULOCYTES NFR BLD AUTO: 0.1 % (ref 0–0.5)
LDLC SERPL CALC-MCNC: 111 MG/DL (ref 0–100)
LDLC/HDLC SERPL: 2.1 {RATIO}
LYMPHOCYTES # BLD AUTO: 1.47 10*3/MM3 (ref 0.7–3.1)
LYMPHOCYTES NFR BLD AUTO: 20.8 % (ref 19.6–45.3)
MCH RBC QN AUTO: 29.8 PG (ref 26.6–33)
MCHC RBC AUTO-ENTMCNC: 33.5 G/DL (ref 31.5–35.7)
MCV RBC AUTO: 89.1 FL (ref 79–97)
MONOCYTES # BLD AUTO: 0.51 10*3/MM3 (ref 0.1–0.9)
MONOCYTES NFR BLD AUTO: 7.2 % (ref 5–12)
NEUTROPHILS # BLD AUTO: 4.87 10*3/MM3 (ref 1.7–7)
NEUTROPHILS NFR BLD AUTO: 68.8 % (ref 42.7–76)
NRBC BLD AUTO-RTO: 0 /100 WBC (ref 0–0.2)
PLATELET # BLD AUTO: 220 10*3/MM3 (ref 140–450)
POTASSIUM SERPL-SCNC: 4.4 MMOL/L (ref 3.5–5.2)
PROT SERPL-MCNC: 7 G/DL (ref 6–8.5)
PSA SERPL-MCNC: 1.08 NG/ML (ref 0–4)
RBC # BLD AUTO: 5.06 10*6/MM3 (ref 4.14–5.8)
SARS-COV-2 AB SERPL QL IA: POSITIVE
SODIUM SERPL-SCNC: 142 MMOL/L (ref 136–145)
TRIGL SERPL-MCNC: 94 MG/DL (ref 0–150)
VLDLC SERPL CALC-MCNC: 17 MG/DL (ref 5–40)
WBC # BLD AUTO: 7.08 10*3/MM3 (ref 3.4–10.8)

## 2021-08-26 ENCOUNTER — OFFICE VISIT (OUTPATIENT)
Dept: ORTHOPEDIC SURGERY | Facility: CLINIC | Age: 66
End: 2021-08-26

## 2021-08-26 VITALS — HEIGHT: 72 IN | WEIGHT: 250 LBS | BODY MASS INDEX: 33.86 KG/M2

## 2021-08-26 DIAGNOSIS — S83.231A COMPLEX TEAR OF MEDIAL MENISCUS OF RIGHT KNEE AS CURRENT INJURY, INITIAL ENCOUNTER: ICD-10-CM

## 2021-08-26 DIAGNOSIS — Z98.890 S/P ARTHROSCOPIC PARTIAL MEDIAL MENISCECTOMY: Primary | ICD-10-CM

## 2021-08-26 DIAGNOSIS — M94.261 CHONDROMALACIA OF RIGHT KNEE: ICD-10-CM

## 2021-08-26 PROCEDURE — 99024 POSTOP FOLLOW-UP VISIT: CPT | Performed by: ORTHOPAEDIC SURGERY

## 2021-11-30 ENCOUNTER — HOSPITAL ENCOUNTER (OUTPATIENT)
Dept: GENERAL RADIOLOGY | Facility: HOSPITAL | Age: 66
Discharge: HOME | End: 2021-11-30
Admitting: FAMILY MEDICINE

## 2021-11-30 DIAGNOSIS — M25.562 ACUTE PAIN OF LEFT KNEE: ICD-10-CM

## 2021-11-30 DIAGNOSIS — M25.562 ACUTE PAIN OF LEFT KNEE: Primary | ICD-10-CM

## 2021-11-30 PROCEDURE — 73562 X-RAY EXAM OF KNEE 3: CPT

## 2021-12-01 ENCOUNTER — OFFICE VISIT (OUTPATIENT)
Dept: ORTHOPEDIC SURGERY | Facility: CLINIC | Age: 66
End: 2021-12-01

## 2021-12-01 VITALS — HEIGHT: 72 IN | BODY MASS INDEX: 33.86 KG/M2 | WEIGHT: 250 LBS

## 2021-12-01 DIAGNOSIS — M70.52 PES ANSERINUS BURSITIS OF LEFT KNEE: Primary | ICD-10-CM

## 2021-12-01 PROCEDURE — 99212 OFFICE O/P EST SF 10 MIN: CPT | Performed by: ORTHOPAEDIC SURGERY

## 2021-12-01 NOTE — PROGRESS NOTES
"Subjective:     Patient ID: Nabor Muhammad Dr. is a 65 y.o. male.    Chief Complaint:  Follow-up left knee pain  History of Present Illness  Nabor Muhammad Dr. returns to clinic today for evaluation of left knee, states he started to have some increasing pain over the last 2 to 3 weeks of the medial aspect of his knee primarily in the medial proximal tibia over his pes bursa.  With activity modification, soft tissue massage, and some intermittent use of anti-inflammatory medication he has done much better over the last several days and his pain is nearly completely resolved.     Social History     Occupational History   • Not on file   Tobacco Use   • Smoking status: Never Smoker   • Smokeless tobacco: Never Used   Vaping Use   • Vaping Use: Never used   Substance and Sexual Activity   • Alcohol use: Yes     Comment: occ   • Drug use: No   • Sexual activity: Defer      Past Medical History:   Diagnosis Date   • Arthritis    • Hypertension    • Lumbosacral disc disease    • OA (osteoarthritis)    • YANETH (obstructive sleep apnea)     CPAP     Past Surgical History:   Procedure Laterality Date   • CHOLECYSTECTOMY     • FEMUR CLOSED REDUCTION Left 1970   • KNEE MENISCECTOMY Right 7/21/2021    Procedure: KNEE PARTIAL MEDIAL MENISCECTOMY,;  Surgeon: Terry Zarate MD;  Location: [facility];  Service: Orthopedics;  Laterality: Right;   • SHOULDER SURGERY Right 1998   • TONSILLECTOMY AND ADENOIDECTOMY     • WRIST GANGLION EXCISION Bilateral     R-1990 L-1996       Family History   Problem Relation Age of Onset   • COPD Father    • Malig Hyperthermia Neg Hx          Review of Systems   Musculoskeletal: Positive for arthralgias and myalgias.           Objective:  Vitals:    12/01/21 0746   Weight: 113 kg (250 lb)   Height: 181.6 cm (71.5\")         12/01/21  0746   Weight: 113 kg (250 lb)     Body mass index is 34.38 kg/m².  Physical Exam    Vital signs reviewed.   General: No acute distress, alert and oriented  Eyes: " conjunctiva clear; pupils equally round and reactive  ENT: external ears and nose atraumatic; oropharynx clear  CV: no peripheral edema  Resp: normal respiratory effort  Skin: no rashes or wounds; normal turgor  Psych: mood and affect appropriate; recent and remote memory intact          Ortho Exam     Left knee-active range of motion 0 to 130 degrees, 4+ out of 5 strength on flexion and extension, minimal residual tenderness over pes bursa, stable to varus valgus stress at 0 and 30 degrees, mild increased pain on resisted knee flexion.  Negative Diana exam, grade 1A Lockman, negative anterior posterior drawer.  No significant effusion to the left knee noted.  Imaging:  MRI left knee from 2020  IMPRESSION:     1. Persistent mildly complex tear of the posterior horn and body segment medial meniscus, detailed above, similar in appearance to prior examination from 2/8/2016.  2. Lateral meniscus, cruciate ligaments, and collateral ligaments are intact.  3. Stable chondromalacia of the patellofemoral joint and medial compartment.  4. Small near full-thickness partially delaminating chondral fissure in the posterior weightbearing lateral tibial plateau.  5. Mild arthrosis of the proximal tibiofibular syndesmosis with suspected small 1 cm ganglion cysts extending anterior and posterior to the syndesmosis.    X-ray left knee from outside facility  November 30, 2021  Moderate medial patellofemoral compartment osteoarthritis with joint space narrowing and reactive osteophyte formation  Assessment:        1. Pes anserinus bursitis of left knee           Plan:          1. Discussed treatment options at length with patient at today's visit.  This appears to be primarily a recent flare of pes bursitis, recommended continue conservative treatment as he is doing at this point time.  He can supplement with over-the-counter anti-inflammatory medications, topical anti-inflammatory cream, knee sleeve, and stretching.  If it does get  worse we could consider oral steroid versus pes bursa injection.  2. Follow up: As needed      Nabor Muhammad Dr. was in agreement with plan and had all questions answered.     Orders:  No orders of the defined types were placed in this encounter.      Medications:  No orders of the defined types were placed in this encounter.      Followup:  Return if symptoms worsen or fail to improve.    Diagnoses and all orders for this visit:    1. Pes anserinus bursitis of left knee (Primary)          Dictated utilizing Dragon dictation

## 2021-12-15 PROBLEM — M70.52 PES ANSERINUS BURSITIS OF LEFT KNEE: Status: ACTIVE | Noted: 2021-12-15

## 2022-07-25 DIAGNOSIS — M25.572 CHRONIC PAIN OF LEFT ANKLE: Primary | ICD-10-CM

## 2022-07-25 DIAGNOSIS — G89.29 CHRONIC PAIN OF LEFT ANKLE: Primary | ICD-10-CM

## 2022-07-27 ENCOUNTER — HOSPITAL ENCOUNTER (OUTPATIENT)
Dept: GENERAL RADIOLOGY | Facility: HOSPITAL | Age: 67
Discharge: HOME | End: 2022-07-27
Admitting: PHYSICIAN ASSISTANT

## 2022-07-27 DIAGNOSIS — G89.29 CHRONIC PAIN OF LEFT ANKLE: ICD-10-CM

## 2022-07-27 DIAGNOSIS — M25.572 CHRONIC PAIN OF LEFT ANKLE: ICD-10-CM

## 2022-07-27 PROCEDURE — 73610 X-RAY EXAM OF ANKLE: CPT

## 2022-08-08 DIAGNOSIS — E55.9 VITAMIN D DEFICIENCY: ICD-10-CM

## 2022-08-08 DIAGNOSIS — Z00.00 ANNUAL PHYSICAL EXAM: Primary | ICD-10-CM

## 2022-08-08 DIAGNOSIS — Z12.5 SCREENING FOR MALIGNANT NEOPLASM OF PROSTATE: ICD-10-CM

## 2022-08-11 DIAGNOSIS — Z01.84 IMMUNITY STATUS TESTING: Primary | ICD-10-CM

## 2022-08-15 LAB — SARS-COV-2 AB SERPL QL IA: POSITIVE

## 2022-08-24 DIAGNOSIS — Z12.12 SCREENING FOR MALIGNANT NEOPLASM OF THE RECTUM: ICD-10-CM

## 2022-08-24 DIAGNOSIS — Z12.11 SPECIAL SCREENING FOR MALIGNANT NEOPLASMS, COLON: Primary | ICD-10-CM

## 2022-10-03 DIAGNOSIS — M54.16 RIGHT LUMBAR RADICULOPATHY: Primary | ICD-10-CM

## 2022-10-21 ENCOUNTER — APPOINTMENT (OUTPATIENT)
Dept: MRI IMAGING | Facility: HOSPITAL | Age: 67
End: 2022-10-21

## 2023-03-01 ENCOUNTER — OFFICE VISIT (OUTPATIENT)
Dept: FAMILY MEDICINE CLINIC | Facility: CLINIC | Age: 68
End: 2023-03-01
Payer: COMMERCIAL

## 2023-03-01 VITALS
SYSTOLIC BLOOD PRESSURE: 130 MMHG | HEIGHT: 72 IN | OXYGEN SATURATION: 94 % | HEART RATE: 61 BPM | DIASTOLIC BLOOD PRESSURE: 86 MMHG | BODY MASS INDEX: 34.38 KG/M2

## 2023-03-01 DIAGNOSIS — M54.10 RADICULAR LEG PAIN: Primary | ICD-10-CM

## 2023-03-01 PROCEDURE — 99213 OFFICE O/P EST LOW 20 MIN: CPT | Performed by: PHYSICIAN ASSISTANT

## 2023-03-01 NOTE — PROGRESS NOTES
"Chief Complaint  Back Pain (Right side )    Subjective          Nabor Muhammad Dr. presents to [facility]  History of Present Illness  Nabor is a 67-year-old male who presents with chronic right medial thigh radiculopathy/numbness.  This has been occurring since summer 2022.  States he noticed the numbness in his medial aspect of thigh after standing for a prolonged amount of time.  Symptoms improved with movement.  At times after he stands it feels like his right thigh gives out on him.  Denied any recent injury, trauma or falls.  They radiculopathy symptoms will go down to his right foot.  States he feels like his leg has been weak. States sitting helps.  Has had similar issue in past but was associated with left lateral thigh.  Saw neurosurgeon, Dr. Ocampo at that time.  He was diagnosed with a bulging disc at L 2-L 3.  Bowel movements have been normal without dark black tarry stools or incontinence.  Denied any current abdominal pain, GI upset, bladder/stool incontinence or fevers or chills.  Pain or symptoms do not wake him up at night.  Review of Systems   Respiratory: Negative.    Cardiovascular: Negative.    Gastrointestinal: Negative.    Genitourinary: Negative.    Neurological: Positive for weakness and numbness.   Psychiatric/Behavioral: Negative.  Negative for suicidal ideas.   All other systems reviewed and are negative.    Objective   Vital Signs:   /86 (BP Location: Left arm, Patient Position: Sitting, Cuff Size: Large Adult)   Pulse 61   Ht 181.6 cm (71.5\")   SpO2 94%   BMI 34.38 kg/m²     Physical Exam  Vitals and nursing note reviewed.   Constitutional:       Appearance: Normal appearance. He is well-developed and well-groomed.      Interventions: Face mask in place.   Abdominal:      General: Bowel sounds are normal.      Palpations: Abdomen is soft.      Tenderness: There is no right CVA tenderness, left CVA tenderness, guarding or rebound. Negative " signs include Plummer's sign, Rovsing's sign, McBurney's sign, psoas sign and obturator sign.   Musculoskeletal:      Lumbar back: No swelling, edema, deformity, signs of trauma, lacerations, spasms, tenderness or bony tenderness. Normal range of motion. Negative right straight leg raise test and negative left straight leg raise test. No scoliosis.      Right upper leg: Normal. No swelling, edema, deformity, lacerations, tenderness or bony tenderness.      Comments: Low back: Full range of motion is noted.  5/5 bilateral muscle strength, 2+ DTR's, 2+ distal pulses, negative straight leg raise and good gait/heel-toe walk noted.  Back was non tender to palpation.   Skin:     General: Skin is warm and dry.      Capillary Refill: Capillary refill takes less than 2 seconds.   Neurological:      Mental Status: He is alert.      Motor: Motor function is intact.      Gait: Gait is intact.      Deep Tendon Reflexes:      Reflex Scores:       Patellar reflexes are 2+ on the right side and 2+ on the left side.       Achilles reflexes are 2+ on the right side and 2+ on the left side.  Psychiatric:         Attention and Perception: Attention and perception normal.         Mood and Affect: Mood and affect normal.         Speech: Speech normal.         Behavior: Behavior normal. Behavior is cooperative.         Thought Content: Thought content normal.         Cognition and Memory: Cognition normal.         Judgment: Judgment normal.     I wore a facial mask during this patient encounter.  Patient also wearing a surgical mask.  Hand hygiene performed before and after seeing the patient.     Result Review :                 Assessment and Plan    Diagnoses and all orders for this visit:    1. Radicular leg pain (Primary)  -     MRI Lumbar Spine Without Contrast; Future    Nabor was seen in office today with chronic right medial aspect of thigh radiculopathy that has been occurring for the past 9 months.  We will proceed with MRI of  the lumbar spine for further evaluation.  He will be notified of test results when completed.  We will consider referral to neurosurgeon in future if warranted.      Follow Up   No follow-ups on file.  Patient was given instructions and counseling regarding his condition or for health maintenance advice. Please see specific information pulled into the AVS if appropriate.     TONE[facility]  [address] [place] [id number]  Dept: [phone]  Dept Fax: [phone]  Loc: [phone]  Loc Fax: [phone]

## 2023-03-08 DIAGNOSIS — M54.16 RIGHT LUMBAR RADICULOPATHY: ICD-10-CM

## 2023-03-08 DIAGNOSIS — M54.10 RADICULAR LEG PAIN: Primary | ICD-10-CM

## 2023-03-22 ENCOUNTER — TREATMENT (OUTPATIENT)
Dept: PHYSICAL THERAPY | Facility: CLINIC | Age: 68
End: 2023-03-22
Payer: COMMERCIAL

## 2023-03-22 DIAGNOSIS — M54.16 LUMBAR BACK PAIN WITH RADICULOPATHY AFFECTING RIGHT LOWER EXTREMITY: Primary | ICD-10-CM

## 2023-03-22 DIAGNOSIS — R68.89 DECREASED FUNCTIONAL ACTIVITY TOLERANCE: ICD-10-CM

## 2023-03-22 PROCEDURE — 97110 THERAPEUTIC EXERCISES: CPT | Performed by: PHYSICAL THERAPIST

## 2023-03-22 PROCEDURE — 97161 PT EVAL LOW COMPLEX 20 MIN: CPT | Performed by: PHYSICAL THERAPIST

## 2023-03-22 PROCEDURE — 97012 MECHANICAL TRACTION THERAPY: CPT | Performed by: PHYSICAL THERAPIST

## 2023-03-22 NOTE — PROGRESS NOTES
Physical Therapy Initial Evaluation and Plan of Care    Patient: Nabor Muhammad Dr.   : 1955  Diagnosis/ICD-10 Code:  Lumbar back pain with radiculopathy affecting right lower extremity [M54.16]  Referring practitioner: Claire Jerry PA-C  Date of Initial Visit: 3/22/2023  Today's Date: 3/22/2023  Patient seen for 1 session         Visit Diagnoses:    ICD-10-CM ICD-9-CM   1. Lumbar back pain with radiculopathy affecting right lower extremity  M54.16 724.4   2. Decreased functional activity tolerance  R68.89 780.99         Subjective Questionnaire: back index 12%      Subjective Evaluation    History of Present Illness  Onset date: summer 2022   Mechanism of injury: Pt is a 66 y/o WM who reports to the clinic with B LE pain right > left with some weakness during long periods of standing.  Pt denies any prior injury that occurred to cause this pain.  Pt has a previous Hx of left LE pain, which he was Dx with a L2-3 bulging disc and was seen by Dr. Ocampo.   Pt still has some left LE numbness and [facility] due to having a 13 mm HNP.  Pt with difficulty standing due to right groin pain with some weakness in the right leg. Has x-ray and MRI ordered. Pt states this episode is in his right groin and the discomfort has been progressive.  The numbness has gone down to the medial knee and into the great toe once.  Pt states his back is always tight, but no pain.  He sits and stretches in the morning prior to getting dressed.        Patient Occupation: MD for [employer]  Quality of life: good    Pain  Current pain ratin  At worst pain ratin  Location: right groin, medial knee- back has tightness  (right N/T and leg wants to give out )  Quality: tight  Relieving factors: rest and change in position (sitting )  Aggravating factors: standing (standing stationary )    Hand dominance: right    Diagnostic Tests  No diagnostic tests performed    Treatments  Previous treatment:  physical therapy (knee scope and HNP )  Patient Goals  Patient goals for therapy: decreased pain and increased strength  Patient goal: wants to be stable and no more paresthesia            Objective          Postural Observations  Seated posture: good    Additional Postural Observation Details  Standing level iliac crest and greater trochanter     Palpation     Additional Palpation Details  Pt denies point tenderness in B L-S and hip area with palpation     Neurological Testing     Sensation     Lumbar   Left   Diminished: light touch    Comments   Left light touch: L2-L3    Reflexes   Left   Patellar (L4): trace (1+)    Right   Patellar (L4): absent (0)    Active Range of Motion     Lumbar   Flexion: WFL  Extension: WFL  Left lateral flexion: WFL  Right lateral flexion: WFL  Left rotation: WFL  Right rotation: WFL    Additional Active Range of Motion Details  Pt with good lumbar AROM all planes, pt c/o LBP tightness with B SB but WNL     Strength/Myotome Testing     Left Hip   Planes of Motion   Flexion: 4+    Right Hip   Planes of Motion   Flexion: 4+    Left Knee   Flexion: 5  Extension: 5    Right Knee   Flexion: 5  Extension: 5    Left Ankle/Foot   Dorsiflexion: 5  Plantar flexion: 5  Eversion: 5  Great toe extension: 5    Right Ankle/Foot   Dorsiflexion: 5  Plantar flexion: 5  Eversion: 5  Great toe extension: 5    Additional Strength Details  Upper abdominal strength 3/5   Trunk Ext 4/5     Tests       Thoracic   Negative slump.     Lumbar     Left   Negative crossed SLR and passive SLR.     Right   Negative crossed SLR and passive SLR.     Left Hip   Negative Ely's, CINDY, FADIR, long sit and piriformis.   90/90 SLR: Negative.   SLR: Negative.     Right Hip   Positive long sit and piriformis.   Negative Ely's and CINDY.   90/90 SLR: Negative.  SLR: Negative.     Additional Tests Details  During left PROM IR pt c/o LBP tightness   Pt with a right posterior pelvic innominate   Right hip IR and ER mild  tightness         Assessment & Plan     Assessment  Impairments: activity intolerance, impaired physical strength, lacks appropriate home exercise program and pain with function  Functional Limitations: uncomfortable because of pain, standing and unable to perform repetitive tasks  Assessment details: Pt is a 68 y/o WM who reports to the clinic with LBP pain and right LE pain and weakness, decreased piriformis flexibility, decreased core strength, right posterior pelvic innominate, diminished L4 reflex, and increased numbness and weakness in the right LR with periods of standing.  Pt's signs and symptoms are consistent with a diagnosis of right lumbar radiculopathy . Patient is appropriate for skilled PT to address impairments and reduce pain in order to improve ease with daily mobility and ADLs.  Pt was educated on course of treatment, possible causes of his pain, use of heat PRN, mechanical ILT, and discussed his HEP.            Prognosis: good    Goals  Plan Goals: STGs: 2-4 weeks  1.  Decrease right LE numbness to a 2/10 with standing to increase pt's dynamic standing balance and stability.         2.  Increase right hip IR and ER flexibility to minimal tightness in prone for increased ability to dress his LEs.      LTGs: 6-8 weeks  1.  Decrease right LE numbness to a < or = 1/10 with standing to increase pt's dynamic standing balance.        2. Pt is independent with HEP for self management of symptoms.    3.  Increase pt's upper abdominal strength to a 4/5 for improved posture and decreased compression on his lower lumbar spine.         Plan  Therapy options: will be seen for skilled therapy services  Planned modality interventions: cryotherapy, electrical stimulation/Chinese stimulation, ultrasound, traction and thermotherapy (hydrocollator packs)  Planned therapy interventions: abdominal trunk stabilization, flexibility, functional ROM exercises, home exercise program, stretching, strengthening, spinal/joint  mobilization, manual therapy, neuromuscular re-education, soft tissue mobilization and therapeutic activities  Frequency: 2x week  Duration in weeks: 8  Treatment plan discussed with: patient      Timed:         Manual Therapy:   5      mins  58416;     Therapeutic Exercise:   20      mins  22966;     Neuromuscular Tyrone:        mins  10447;    Therapeutic Activity:          mins  59625;     Gait Training:           mins  24520;     Ultrasound:          mins  69222;    Ionto                                   mins   06294  Self Care                            mins   58470  Canalith Repos         mins 79645      Un-Timed:  Electrical Stimulation:         mins  52809 ( );  Dry Needling          mins self-pay  Traction    10      mins 51141  Low Eval    20      Mins  83046  Mod Eval          Mins  69792  High Eval                            Mins  86856        Timed Treatment:  25    mins   Total Treatment:    60    mins      PT: Graciela Trejo PT     License Number: [id number]  Electronically signed by Graciela Trejo PT, 03/22/23, 4:08 AM EDT    Certification Period: 3/22/2023 thru 6/19/2023 I certify that the therapy services are furnished while this patient is under my care.  The services outlined above are required by this patient, and will be reviewed every 90 days.         Physician Signature:__________________________________________________    PHYSICIAN: Claire Jerry PA-C  NPI: [id number]                                      DATE:      Please sign and return via fax to [email or web address] . Thank you, [facility].

## 2023-03-24 ENCOUNTER — HOSPITAL ENCOUNTER (OUTPATIENT)
Dept: MRI IMAGING | Facility: HOSPITAL | Age: 68
Discharge: HOME | End: 2023-03-24
Payer: COMMERCIAL

## 2023-03-29 ENCOUNTER — TREATMENT (OUTPATIENT)
Dept: PHYSICAL THERAPY | Facility: CLINIC | Age: 68
End: 2023-03-29
Payer: COMMERCIAL

## 2023-03-29 DIAGNOSIS — M54.16 LUMBAR BACK PAIN WITH RADICULOPATHY AFFECTING RIGHT LOWER EXTREMITY: Primary | ICD-10-CM

## 2023-03-29 DIAGNOSIS — R68.89 DECREASED FUNCTIONAL ACTIVITY TOLERANCE: ICD-10-CM

## 2023-03-29 PROCEDURE — 97012 MECHANICAL TRACTION THERAPY: CPT | Performed by: PHYSICAL THERAPIST

## 2023-03-29 PROCEDURE — 97530 THERAPEUTIC ACTIVITIES: CPT | Performed by: PHYSICAL THERAPIST

## 2023-03-29 NOTE — PROGRESS NOTES
Physical Therapy Daily Treatment Note      Patient: Nabor Muhammad Dr.   : 1955  Referring practitioner: Claire eJrry PA-C  Date of Initial Visit: Type: THERAPY  Noted: 3/22/2023  Today's Date: 3/29/2023  Patient seen for 2 sessions       Visit Diagnoses:    ICD-10-CM ICD-9-CM   1. Lumbar back pain with radiculopathy affecting right lower extremity  M54.16 724.4   2. Decreased functional activity tolerance  R68.89 780.99       Nabor Muhammad Dr. reports: he has not noticed any changes since his last treatment.      Subjective       Objective   See Exercise, Manual, and Modality Logs for complete treatment.     Pt presents to the clinic with a slight right posterior pelvic tilt with a positive supine to long sitting test       Assessment & Plan     Assessment    Assessment details: Pt is tolerating treatment, but still is c/o right medial thigh/groin numbness.  Denies having any increased pain or soreness after last treatment.  Pt still presents to the clinic with a slight right posterior pelvic innominate, but corrects with MET.  Reviewed HEP and issued pt a copy of his HEP.  Continued with the mechanical ILT, but increased time and weight today.  Pt did c/o some lower back soreness post treatment.  Will continue to see pt once per week for stabilization exercises, stretching, and modalities PRN.           Progress per Plan of Care      Timed:         Manual Therapy:    2     mins  12180;     Therapeutic Exercise:         mins  27353;     Neuromuscular Tyrone:        mins  98540;    Therapeutic Activity:    15      mins  66294;     Gait Training:           mins  56969;     Ultrasound:          mins  72310;    Ionto                                   mins   23454  Self Care                            mins   46189  Canalith Repos         mins 60463      Un-Timed:  Electrical Stimulation:         mins  82216 ( );  Dry Needling          mins self-pay  Traction    15      mins 31533      Timed  Treatment:  17    mins   Total Treatment:    46    mins    Graciela Trejo, PT  [place] License: [id number]

## 2023-04-12 ENCOUNTER — TREATMENT (OUTPATIENT)
Dept: PHYSICAL THERAPY | Facility: CLINIC | Age: 68
End: 2023-04-12
Payer: COMMERCIAL

## 2023-04-12 DIAGNOSIS — M54.16 LUMBAR BACK PAIN WITH RADICULOPATHY AFFECTING RIGHT LOWER EXTREMITY: Primary | ICD-10-CM

## 2023-04-12 DIAGNOSIS — R68.89 DECREASED FUNCTIONAL ACTIVITY TOLERANCE: ICD-10-CM

## 2023-04-12 PROCEDURE — 97012 MECHANICAL TRACTION THERAPY: CPT | Performed by: PHYSICAL THERAPIST

## 2023-04-12 NOTE — PROGRESS NOTES
Physical Therapy Daily Treatment Note    Patient: Nabor Muhammad Dr.   : 1955  Referring practitioner: Claire Jerry PA-C  Date of Initial Visit: Type: THERAPY  Noted: 3/22/2023  Today's Date: 2023  Patient seen for 3 sessions       Visit Diagnoses:    ICD-10-CM ICD-9-CM   1. Lumbar back pain with radiculopathy affecting right lower extremity  M54.16 724.4   2. Decreased functional activity tolerance  R68.89 780.99       Nabor Muhammad Dr. reports: his right LE pain is the same with periods of standing.  Pt states the exercises have helped his lower back pain. Pt states he is doing his HEP.      Subjective       Objective   See Exercise, Manual, and Modality Logs for complete treatment.     Pt presents with a slight right posterior pelvic innominate.      Assessment & Plan     Assessment    Assessment details: Pt continues to report to the clinic with c/o right groin pain and LE weakness with periods of standing.  Pt with some improvement in LBP with doing his HEP.  Pt presents to the clinic with a slight posterior pelvic innominate, which improves with MET.  Continued with the mechanical ILT and pt denies having any increased pain after receiving traction, but pt also has not noticed any improvement in his right radicular pain since starting PT.  Pt would benefit in further testing due to pt not reporting of improvements in his right radicular leg pain since starting PT.  Will continue to see pt once per week for stretching, stabilization exercises, and modalities PRN.  Will continue as MD advises.        Progress per Plan of Care      Timed:         Manual Therapy:   2      mins  77564;     Therapeutic Exercise:         mins  67844;     Neuromuscular Tyrone:        mins  19583;    Therapeutic Activity:          mins  49077;     Gait Training:           mins  95228;     Ultrasound:          mins  50691;    Ionto                                   mins   12989  Self Care                             mins   11686  [facility]         mins 95136      Un-Timed:  Electrical Stimulation:         mins  27322 ( );  Dry Needling          mins self-pay  Traction    15      mins 99718      Timed Treatment:   2   mins   Total Treatment:    30    mins    Graciela Trejo, PT  [place] License: [id number]

## 2023-04-13 ENCOUNTER — HOSPITAL ENCOUNTER (OUTPATIENT)
Dept: GENERAL RADIOLOGY | Facility: HOSPITAL | Age: 68
Discharge: HOME | End: 2023-04-13
Admitting: PHYSICIAN ASSISTANT
Payer: COMMERCIAL

## 2023-04-13 DIAGNOSIS — M54.16 RIGHT LUMBAR RADICULOPATHY: ICD-10-CM

## 2023-04-13 DIAGNOSIS — M54.10 RADICULAR LEG PAIN: Primary | ICD-10-CM

## 2023-04-13 DIAGNOSIS — M54.10 RADICULAR LEG PAIN: ICD-10-CM

## 2023-04-13 DIAGNOSIS — M51.36 DDD (DEGENERATIVE DISC DISEASE), LUMBAR: ICD-10-CM

## 2023-04-13 PROCEDURE — 72110 X-RAY EXAM L-2 SPINE 4/>VWS: CPT

## 2023-05-12 ENCOUNTER — HOSPITAL ENCOUNTER (OUTPATIENT)
Dept: MRI IMAGING | Facility: HOSPITAL | Age: 68
Discharge: HOME | End: 2023-05-12
Payer: COMMERCIAL

## 2023-05-12 DIAGNOSIS — M54.16 RIGHT LUMBAR RADICULOPATHY: ICD-10-CM

## 2023-05-12 DIAGNOSIS — M54.10 RADICULAR LEG PAIN: ICD-10-CM

## 2023-05-12 DIAGNOSIS — M51.36 DDD (DEGENERATIVE DISC DISEASE), LUMBAR: ICD-10-CM

## 2023-05-12 PROCEDURE — 72148 MRI LUMBAR SPINE W/O DYE: CPT

## 2023-05-16 DIAGNOSIS — M54.16 RIGHT LUMBAR RADICULOPATHY: ICD-10-CM

## 2023-05-16 DIAGNOSIS — M54.10 RADICULAR LEG PAIN: Primary | ICD-10-CM

## 2023-05-16 DIAGNOSIS — M51.36 DDD (DEGENERATIVE DISC DISEASE), LUMBAR: ICD-10-CM

## 2023-06-23 PROBLEM — R20.0 NUMBNESS AND TINGLING OF RIGHT LEG: Status: ACTIVE | Noted: 2023-06-23

## 2023-06-23 PROBLEM — R20.2 NUMBNESS AND TINGLING OF RIGHT LEG: Status: ACTIVE | Noted: 2023-06-23

## 2023-06-23 PROBLEM — M51.36 DDD (DEGENERATIVE DISC DISEASE), LUMBAR: Status: ACTIVE | Noted: 2023-06-23

## 2023-06-23 PROBLEM — M51.369 DDD (DEGENERATIVE DISC DISEASE), LUMBAR: Status: ACTIVE | Noted: 2023-06-23

## 2023-06-23 PROBLEM — M48.062 SPINAL STENOSIS OF LUMBAR REGION WITH NEUROGENIC CLAUDICATION: Status: ACTIVE | Noted: 2023-06-23

## 2023-11-20 ENCOUNTER — TELEPHONE (OUTPATIENT)
Dept: FAMILY MEDICINE CLINIC | Facility: CLINIC | Age: 68
End: 2023-11-20
Payer: COMMERCIAL

## 2023-11-20 ENCOUNTER — OFFICE VISIT (OUTPATIENT)
Dept: FAMILY MEDICINE CLINIC | Facility: CLINIC | Age: 68
End: 2023-11-20
Payer: COMMERCIAL

## 2023-11-20 ENCOUNTER — HOSPITAL ENCOUNTER (OUTPATIENT)
Dept: GENERAL RADIOLOGY | Facility: HOSPITAL | Age: 68
Discharge: HOME | End: 2023-11-20
Admitting: PHYSICIAN ASSISTANT
Payer: COMMERCIAL

## 2023-11-20 VITALS
SYSTOLIC BLOOD PRESSURE: 124 MMHG | HEART RATE: 76 BPM | BODY MASS INDEX: 34.38 KG/M2 | HEIGHT: 72 IN | TEMPERATURE: 98 F | OXYGEN SATURATION: 97 % | DIASTOLIC BLOOD PRESSURE: 76 MMHG

## 2023-11-20 DIAGNOSIS — M25.562 ACUTE PAIN OF LEFT KNEE: Primary | ICD-10-CM

## 2023-11-20 DIAGNOSIS — M25.562 ACUTE PAIN OF LEFT KNEE: ICD-10-CM

## 2023-11-20 PROCEDURE — 73562 X-RAY EXAM OF KNEE 3: CPT

## 2023-11-21 RX ORDER — LIDOCAINE HYDROCHLORIDE 10 MG/ML
1 INJECTION, SOLUTION INFILTRATION; PERINEURAL ONCE
Status: COMPLETED | OUTPATIENT
Start: 2023-11-21 | End: 2023-11-21

## 2023-11-21 RX ORDER — TRIAMCINOLONE ACETONIDE 40 MG/ML
40 INJECTION, SUSPENSION INTRA-ARTICULAR; INTRAMUSCULAR ONCE
Status: COMPLETED | OUTPATIENT
Start: 2023-11-21 | End: 2023-11-21

## 2023-11-21 RX ADMIN — LIDOCAINE HYDROCHLORIDE 1 ML: 10 INJECTION, SOLUTION INFILTRATION; PERINEURAL at 13:14

## 2023-11-21 RX ADMIN — TRIAMCINOLONE ACETONIDE 40 MG: 40 INJECTION, SUSPENSION INTRA-ARTICULAR; INTRAMUSCULAR at 13:13

## 2023-11-21 NOTE — PROGRESS NOTES
"  Subjective   Nabor Muhammad Dr. is a 67 y.o. male who is here for   Chief Complaint   Patient presents with    Knee Pain     Left   .     Knee Pain          Worsening left medial knee pain for several days.  Prior history of trauma and pain  No acute injury  Requesting steroid injection    The following portions of the patient's history were reviewed and updated as appropriate: allergies, current medications, past medical history, past social history, past surgical history, and problem list.    Review of Systems    Objective   Vitals:    11/20/23 1654   BP: 124/76   Pulse: 76   Temp: 98 °F (36.7 °C)   SpO2: 97%   Height: 181.6 cm (71.5\")      Physical Exam    Arthrocentesis    Date/Time: 11/21/2023 8:37 AM    Performed by: Caesar Gould MD  Authorized by: Caesar Gould MD  Consent: Verbal consent obtained. Written consent obtained.  Consent given by: patient  Patient understanding: patient states understanding of the procedure being performed  Patient identity confirmed: verbally with patient  Time out: Immediately prior to procedure a \"time out\" was called to verify the correct patient, procedure, equipment, support staff and site/side marked as required.  Indications: pain   Body area: knee  Joint: left knee  Local anesthesia used: yes    Anesthesia:  Local anesthesia used: yes  Local Anesthetic: lidocaine 1% without epinephrine and topical anesthetic  Anesthetic total: 1 mL    Sedation:  Patient sedated: no    Preparation: Patient was prepped and draped in the usual sterile fashion.  Needle size: 22 G  Ultrasound guidance: no  Approach: medial  Aspirate amount: 0 mL  Patient tolerance: patient tolerated the procedure well with no immediate complications            Assessment & Plan   Diagnoses and all orders for this visit:    1. Acute pain of left knee (Primary)  -     triamcinolone acetonide (KENALOG-40) injection 40 mg  -     lidocaine (XYLOCAINE) 1 % injection 1 mL    Other orders  -     " Arthrocentesis    Successful corticosteroid injection left medial knee  There are no Patient Instructions on file for this visit.    There are no discontinued medications.     Return if symptoms worsen or fail to improve.    Dr. Caesar Gould  [place], [place]

## 2023-12-20 ENCOUNTER — OFFICE VISIT (OUTPATIENT)
Dept: ORTHOPEDIC SURGERY | Facility: CLINIC | Age: 68
End: 2023-12-20
Payer: COMMERCIAL

## 2023-12-20 VITALS — WEIGHT: 250 LBS | HEIGHT: 72 IN | BODY MASS INDEX: 33.86 KG/M2

## 2023-12-20 DIAGNOSIS — M94.262 CHONDROMALACIA OF LEFT KNEE: Primary | ICD-10-CM

## 2023-12-20 RX ADMIN — LIDOCAINE HYDROCHLORIDE 8 ML: 10 INJECTION, SOLUTION EPIDURAL; INFILTRATION; INTRACAUDAL; PERINEURAL at 08:38

## 2023-12-20 RX ADMIN — TRIAMCINOLONE ACETONIDE 80 MG: 40 INJECTION, SUSPENSION INTRA-ARTICULAR; INTRAMUSCULAR at 08:38

## 2024-01-04 DIAGNOSIS — M25.562 MECHANICAL KNEE PAIN, LEFT: Primary | ICD-10-CM

## 2024-01-04 DIAGNOSIS — M25.462 EFFUSION OF LEFT KNEE: ICD-10-CM

## 2024-01-04 RX ORDER — LIDOCAINE HYDROCHLORIDE 10 MG/ML
8 INJECTION, SOLUTION EPIDURAL; INFILTRATION; INTRACAUDAL; PERINEURAL
Status: COMPLETED | OUTPATIENT
Start: 2023-12-20 | End: 2023-12-20

## 2024-01-04 RX ORDER — TRIAMCINOLONE ACETONIDE 40 MG/ML
80 INJECTION, SUSPENSION INTRA-ARTICULAR; INTRAMUSCULAR
Status: COMPLETED | OUTPATIENT
Start: 2023-12-20 | End: 2023-12-20

## 2024-01-11 ENCOUNTER — TELEPHONE (OUTPATIENT)
Dept: ORTHOPEDIC SURGERY | Facility: CLINIC | Age: 69
End: 2024-01-11
Payer: COMMERCIAL

## 2024-01-11 DIAGNOSIS — M17.12 PRIMARY OSTEOARTHRITIS OF LEFT KNEE: Primary | ICD-10-CM

## 2024-01-11 RX ORDER — PREDNISONE 10 MG/1
TABLET ORAL
Qty: 39 TABLET | Refills: 0 | Status: SHIPPED | OUTPATIENT
Start: 2024-01-11

## 2024-01-11 NOTE — TELEPHONE ENCOUNTER
Called the patient over the phone today about results from MRI.  Reviewed with him that he does have some degenerative changes as well as extrusion of his medial meniscus but primary issue appears to be fairly significant chondral loss from both medial femoral condyle and medial tibial plateau.    Given his overall varus alignment, reactive subchondral edema, and degenerative change the medial compartment we discussed options and he would like to proceed with a prednisone taper as well as a medial offloading brace.    Custom brace is necessary for his left knee given underlying degenerative change particular to his medial compartment with varus alignment and mild lateral collateral ligament laxity-custom brace is necessary to provided to him if it needed to successfully offload medial compartment as well as given his underlying thigh calf asymmetry.

## 2024-01-22 ENCOUNTER — TELEPHONE (OUTPATIENT)
Dept: FAMILY MEDICINE CLINIC | Facility: CLINIC | Age: 69
End: 2024-01-22
Payer: COMMERCIAL

## 2024-01-22 DIAGNOSIS — M25.562 CHRONIC PAIN OF LEFT KNEE: ICD-10-CM

## 2024-01-22 DIAGNOSIS — M94.262 CHONDROMALACIA, LEFT KNEE: Primary | ICD-10-CM

## 2024-01-22 DIAGNOSIS — M17.12 PRIMARY OSTEOARTHRITIS OF LEFT KNEE: ICD-10-CM

## 2024-01-22 DIAGNOSIS — G89.29 CHRONIC PAIN OF LEFT KNEE: ICD-10-CM

## 2024-01-22 RX ORDER — HYDROCODONE BITARTRATE AND ACETAMINOPHEN 10; 325 MG/1; MG/1
1 TABLET ORAL EVERY 4 HOURS PRN
Qty: 18 TABLET | Refills: 0 | Status: SHIPPED | OUTPATIENT
Start: 2024-01-22

## 2024-01-22 NOTE — TELEPHONE ENCOUNTER
Would you please approve a short term Lortab 10/325 mg to take as needed for knee pain.  He is currently seeing Ortho.,Dr. Zarate.  Has had xray and MRI.  Will be having surgery in near future.

## 2024-01-22 NOTE — TELEPHONE ENCOUNTER
Pt is waiting for his knee brace on his left knee.  He is having a lot of pain when he is trying to sleep . Pt requesting pain medication he had a couple hydrocodone/ace 5/325mg and that didn't even touch it . He is requesting either higher dose or oxycodone .  He would like it sent to the [facility] in [place]

## 2024-02-19 DIAGNOSIS — M17.12 PRIMARY OSTEOARTHRITIS OF LEFT KNEE: ICD-10-CM

## 2024-02-19 DIAGNOSIS — M94.262 CHONDROMALACIA, LEFT KNEE: ICD-10-CM

## 2024-02-19 DIAGNOSIS — G89.29 CHRONIC PAIN OF LEFT KNEE: ICD-10-CM

## 2024-02-19 DIAGNOSIS — M25.562 CHRONIC PAIN OF LEFT KNEE: ICD-10-CM

## 2024-02-19 RX ORDER — HYDROCODONE BITARTRATE AND ACETAMINOPHEN 10; 325 MG/1; MG/1
1 TABLET ORAL EVERY 4 HOURS PRN
Qty: 18 TABLET | Refills: 0 | Status: SHIPPED | OUTPATIENT
Start: 2024-02-19

## 2024-02-21 NOTE — PROGRESS NOTES
Subjective:     Patient ID: Nabor Muhammad Dr. is a 68 y.o. male.    Chief Complaint:  Follow-up left knee pain, chondromalacia, complex medial meniscus tear posterior horn body from MRI in 2020    History of Present Illness  Nabor Muhammad Dr. presents to clinic today for follow-up evaluation in regard to his left knee. Over the last several weeks, he started having increasing sharp pain to the medial aspect of his knee, particularly worse with deep flexion activities as well as getting up into his truck. He denies any specific injury prior to the onset of his pain. He did have a remote history of prior distal femur fracture when he was 14 years old on that left side, so he has always battled some underlying chondromalacia. He rates pain as moderate to severe in intensity at this point in time, sharp in nature, feels like he is being poked into the medial joint line, which is where he locates the vast majority of his pain. He denies any paresthesia. He denies any fever, chills, or sweats, and no radiation of his pain.     Social History     Occupational History    Not on file   Tobacco Use    Smoking status: Never     Passive exposure: Never    Smokeless tobacco: Never   Vaping Use    Vaping Use: Never used   Substance and Sexual Activity    Alcohol use: Yes     Comment: occ    Drug use: No    Sexual activity: Defer      Past Medical History:   Diagnosis Date    Arthritis     Hypertension     Lumbosacral disc disease     OA (osteoarthritis)     YANETH (obstructive sleep apnea)     CPAP     Past Surgical History:   Procedure Laterality Date    CHOLECYSTECTOMY      FEMUR CLOSED REDUCTION Left 1970    KNEE MENISCECTOMY Right 7/21/2021    Procedure: KNEE PARTIAL MEDIAL MENISCECTOMY,;  Surgeon: Terry Zarate MD;  Location: [facility];  Service: Orthopedics;  Laterality: Right;    SHOULDER SURGERY Right 1998    TONSILLECTOMY AND ADENOIDECTOMY      WRIST GANGLION EXCISION Bilateral     R-1990 L-1996       Family  "History   Problem Relation Age of Onset    COPD Father     [place] Hyperthermia Neg Hx          Review of Systems        Objective:  Vitals:    12/20/23 0753   Weight: 113 kg (250 lb)   Height: 181.6 cm (71.5\")         12/20/23 0753   Weight: 113 kg (250 lb)     Body mass index is 34.38 kg/m².  Physical Exam    Vital signs reviewed.   General: No acute distress, alert and oriented  Eyes: conjunctiva clear; pupils equally round and reactive  ENT: external ears and nose atraumatic; oropharynx clear  CV: no peripheral edema  Resp: normal respiratory effort  Skin: no rashes or wounds; normal turgor  Psych: mood and affect appropriate; recent and remote memory intact        Ortho Exam     Left Knee:    Range of motion is 0 to 130 degrees.  Flexion strength- 4+/5.  Extension strength- 4+/5.  Maximal tenderness to palpation medial joint line.  Diana's exam- Positive with pain along the medial joint line, no click.  Effusion- None.  Lachman's test- Grade 1A.  Anterior drawer- Negative.  Posterior drawer- Negative.  Stable opening on varus and valgus stress at 0 and 30 degrees.  Log roll test- Negative.  Stinchfield's test- Negative.      Imaging:    XR Knee 3 View Left    Result Date: 11/20/2023  Medial and patellofemoral compartment degenerative change with medial compartment joint space narrowing. No fracture.  This report was finalized on 11/20/2023 10:17 AM by Matias Flood MD.        Reviewed outside x-ray left knee indicates mild medial compartment joint space narrowing with overall varus alignment, mild reactive osteophyte formation particularly in the medial and patellofemoral compartments    Assessment:        1. Chondromalacia of left knee           Plan:  Large Joint Arthrocentesis: L knee  Date/Time: 12/20/2023 8:38 AM  Consent given by: patient  Site marked: site marked  Timeout: Immediately prior to procedure a time out was called to verify the correct patient, procedure, equipment, support staff and " site/side marked as required   Supporting Documentation  Indications: pain   Procedure Details  Location: knee - L knee  Preparation: Patient was prepped and draped in the usual sterile fashion  Needle size: 22 G  Approach: anterolateral  Medications administered: 80 mg triamcinolone acetonide 40 MG/ML; 8 mL lidocaine PF 1% 1 %  Patient tolerance: patient tolerated the procedure well with no immediate complications                  1. I discussed treatment options at length with the patient at today's visit.    2. At this point in time, we will proceed with intra-articular injection. If this fails to provide significant relief, we will likely consider proceeding with MRI to evaluate joint space and to evaluate for any meniscal flap that may be creating his issue for him.    3. The patient would like to proceed with cortisone injection today to the left knee. Recommended limited use of affected extremity for the next 24 hours to only essential activities other than work on general active and passive motion. Recommended supplementing with ice and soft tissue massage. Discussed with the patient that they should see results in 5-7 days if no improvement in 5-6 weeks I have asked them to call the office to review other options. The patient should call the office immediately if they notice redness, warmth, fevers, chills, or residual paresthesia for greater than 6 hours after injection.    Nabor Muhammad Dr. was in agreement with plan and had all questions answered.     Orders:  Orders Placed This Encounter   Procedures    Large Joint Arthrocentesis: L knee       Medications:  No orders of the defined types were placed in this encounter.      Followup:  No follow-ups on file.    Diagnoses and all orders for this visit:    1. Chondromalacia of left knee (Primary)    Other orders  -     Large Joint Arthrocentesis: L knee        Transcribed from ambient dictation for Terry Zarate MD by Elvira Rebolledo.  12/20/23   09:18  EST    Patient or patient representative verbalized consent to the visit recording.  I have personally performed the services described in this document as transcribed by the above individual, and it is both accurate and complete.        Dictated utilizing Dragon dictation    no

## 2024-02-22 ENCOUNTER — OFFICE VISIT (OUTPATIENT)
Dept: ORTHOPEDIC SURGERY | Facility: CLINIC | Age: 69
End: 2024-02-22
Payer: COMMERCIAL

## 2024-02-22 VITALS — BODY MASS INDEX: 33.86 KG/M2 | WEIGHT: 250 LBS | HEIGHT: 72 IN

## 2024-02-22 DIAGNOSIS — S83.232A COMPLEX TEAR OF MEDIAL MENISCUS OF LEFT KNEE AS CURRENT INJURY, INITIAL ENCOUNTER: ICD-10-CM

## 2024-02-22 DIAGNOSIS — M84.469A INSUFFICIENCY FRACTURE OF TIBIA, INITIAL ENCOUNTER: ICD-10-CM

## 2024-02-22 DIAGNOSIS — M25.462 EFFUSION OF LEFT KNEE: ICD-10-CM

## 2024-02-22 DIAGNOSIS — M84.452A: ICD-10-CM

## 2024-02-22 DIAGNOSIS — M94.262 CHONDROMALACIA OF LEFT KNEE: Primary | ICD-10-CM

## 2024-02-22 NOTE — PROGRESS NOTES
Subjective:     Patient ID: Nabor Muhammad Dr. is a 68 y.o. male.    Chief Complaint:  Follow-up left knee pain, chondromalacia, complex medial meniscus tear, subchondral insufficiency fracture medial tibial plateau and medial femoral condyle    History of Present Illness  Nabor Muhammad Dr. returns to clinic today for evaluation of left knee stating that his pain is still very significant at this point in time, he has had very little tolerance for walking any type of distance or weightbearing activities.  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  He still notes a significant pain with mechanical symptoms that is intermittent in nature.  His deep aching pain is more consistent for him at this time.  He does note moderate swelling that does wax and wane.  Mild improvement with use of brace.  Minimal improvement with intra-articular injection or oral steroid.  Denies any new numbness or tingling.  Rates pain as moderate to severe in intensity     Social History     Occupational History    Not on file   Tobacco Use    Smoking status: Never     Passive exposure: Never    Smokeless tobacco: Never   Vaping Use    Vaping Use: Never used   Substance and Sexual Activity    Alcohol use: Yes     Comment: occ    Drug use: No    Sexual activity: Defer      Past Medical History:   Diagnosis Date    Arthritis     Hypertension     Lumbosacral disc disease     OA (osteoarthritis)     YANETH (obstructive sleep apnea)     CPAP     Past Surgical History:   Procedure Laterality Date    CHOLECYSTECTOMY      FEMUR CLOSED REDUCTION Left 1970    KNEE MENISCECTOMY Right 7/21/2021    Procedure: KNEE PARTIAL MEDIAL MENISCECTOMY,;  Surgeon: Terry Zarate MD;  Location:   "[facility];  Service: Orthopedics;  Laterality: Right;    SHOULDER SURGERY Right 1998    TONSILLECTOMY AND ADENOIDECTOMY      WRIST GANGLION EXCISION Bilateral     R-1990 L-1996       Family History   Problem Relation Age of Onset    COPD Father     Malig Hyperthermia Neg Hx          Review of Systems        Objective:  Vitals:    02/22/24 0752   Weight: 113 kg (250 lb)   Height: 181.6 cm (71.5\")         02/22/24  0752   Weight: 113 kg (250 lb)     Body mass index is 34.38 kg/m².  Physical Exam    Vital signs reviewed.   General: No acute distress, alert and oriented  Eyes: conjunctiva clear; pupils equally round and reactive  ENT: external ears and nose atraumatic; oropharynx clear  CV: no peripheral edema  Resp: normal respiratory effort  Skin: no rashes or wounds; normal turgor  Psych: mood and affect appropriate; recent and remote memory intact      Ortho Exam     Left Knee:     Range of motion is 0 to 130 degrees.  Flexion strength- 4+/5.  Extension strength- 4+/5.  Maximal tenderness to palpation medial joint line-significant tenderness to both medial tibial plateau and medial femoral condyle just around the joint surface  Diana's exam- Positive with pain along the medial joint line, no click.  Effusion- None.  Lachman's test- Grade 1A.  Anterior drawer- Negative.  Posterior drawer- Negative.  mild asymmetric laxity of the lateral collateral ligament on varus stress at 30 degrees, no significant opening at full extension.  Log roll test- Negative.  Stinchfield's test- Negative.      Imaging:  Reviewed again MRI left knee from [facility] imaging including review of imaging as well as radiology report indicates complex tear of the posterior horn and body of the medial meniscus with subchondral insufficiency stress fractures of both medial femoral condyle and medial tibial plateau.  Moderate medial compartment degenerative change    Assessment:        1. Chondromalacia of left knee    2. Effusion of left knee    3. " Complex tear of medial meniscus of left knee as current injury, initial encounter    4. Insufficiency fracture of tibia, initial encounter    5. Closed subchondral insufficiency fracture of condyle of left femur           Plan:          Discussed treatment options at length with patient at today's visit.  Given significant pain as well as failure of improvement with conservative treatments including limited to home exercises, anti-inflammatory medications, intra-articular injections, bracing, and activity modification as well as findings of MRI as noted above, we discussed options including but not limited to observation, repeat injection, medial compartment versus total knee arthroplasty, and arthroscopy with meniscectomy and some chondroplasty.  At this point in time patient would like to avoid any arthroplasty options is much as possible.  We did discuss limitations of arthroscopic treatment including limited ability to address the knee chondral issues.  He understood this and does wish to proceed.  We will plan on proceeding with surgery at patient's request for a Left knee arthroscopy, subchondroplasty for medial femoral condyle and medial tibial plateau insufficiency fractures, meniscal and cartilage surgery as indicated.  I reviewed details of procedure with patient today as well as a model of a knee and discussed risks, benefits, and alternatives of the procedure with the risks including but not limited to neurovascular damage, bleeding, infection, chronic pain, worsening of pain, chondrolysis, osteonecrosis, recurrent meniscal tear, swelling, loss of motion, weakness, stiffness, instability, DVT, pulmonary embolus, death, stroke, complex regional pain syndrome, and need for additional procedures.  Patient understood all these and had all questions answered today.  No guarantees were given regarding results of surgery.  We will have patient medically optimized if necessary prior to the time of surgery and  plan on proceeding at next available date.   Patient denies history of DVT or pulmonary bliss, denies cardiac history and is not diabetic.  Follow up: For surgical treatment      Nabor Muhammad Dr. was in agreement with plan and had all questions answered.     Orders:  No orders of the defined types were placed in this encounter.      Medications:  No orders of the defined types were placed in this encounter.      Followup:  No follow-ups on file.    Diagnoses and all orders for this visit:    1. Chondromalacia of left knee (Primary)    2. Effusion of left knee    3. Complex tear of medial meniscus of left knee as current injury, initial encounter    4. Insufficiency fracture of tibia, initial encounter    5. Closed subchondral insufficiency fracture of condyle of left femur          Dictated utilizing Dragon dictation

## 2024-02-23 RX ORDER — MELOXICAM 7.5 MG/1
15 TABLET ORAL ONCE
OUTPATIENT
Start: 2024-02-23 | End: 2024-02-23

## 2024-02-23 RX ORDER — ACETAMINOPHEN 325 MG/1
1000 TABLET ORAL ONCE
OUTPATIENT
Start: 2024-02-23 | End: 2024-02-23

## 2024-02-23 RX ORDER — OXYCODONE HCL 10 MG/1
10 TABLET, FILM COATED, EXTENDED RELEASE ORAL ONCE
OUTPATIENT
Start: 2024-02-23 | End: 2024-02-23

## 2024-02-26 PROBLEM — M84.469A INSUFFICIENCY FRACTURE OF TIBIA: Status: ACTIVE | Noted: 2024-02-22

## 2024-02-26 PROBLEM — M84.452A: Status: ACTIVE | Noted: 2024-02-22

## 2024-02-27 PROBLEM — Z01.818 PRE-OP EXAMINATION: Status: ACTIVE | Noted: 2024-02-27

## 2024-02-28 ENCOUNTER — OFFICE VISIT (OUTPATIENT)
Dept: FAMILY MEDICINE CLINIC | Facility: CLINIC | Age: 69
End: 2024-02-28
Payer: COMMERCIAL

## 2024-02-28 VITALS
HEIGHT: 72 IN | HEART RATE: 74 BPM | SYSTOLIC BLOOD PRESSURE: 120 MMHG | OXYGEN SATURATION: 95 % | BODY MASS INDEX: 34.38 KG/M2 | TEMPERATURE: 97.5 F | DIASTOLIC BLOOD PRESSURE: 74 MMHG

## 2024-02-28 DIAGNOSIS — Z01.818 PRE-OP EXAMINATION: Primary | ICD-10-CM

## 2024-02-28 DIAGNOSIS — H61.23 BILATERAL IMPACTED CERUMEN: ICD-10-CM

## 2024-02-28 NOTE — PROGRESS NOTES
"Chief Complaint  Surgical Clearance (Left knee surgery, Dr. Zarate 3/5/24)    Subjective          Nabor Muhammad Dr. presents to [facility]  History of Present Illness  Nabor is a 68-year-old male who presents for presurgical clearance for upcoming left knee surgery.  Will be having surgery on March 5, 2024 at [facility] by Dr. Terry Zarate.  Will be having blood work/EKG on March 1, 2024.  Overall he is feeling well except for knee pain at office visit today.  His sleep has been restless due to his knee issues.  Diet has been fairly healthy.  Denied any current fevers, chills, upper respiratory symptoms, chest pain, shortness of air, cough, wheezing, abdominal pain, GI upset or swelling of ankles.  Has been compliant with his medications.     Objective   Vital Signs:   /74 (BP Location: Right arm, Patient Position: Sitting, Cuff Size: Large Adult)   Pulse 74   Temp 97.5 °F (36.4 °C)   Ht 181.6 cm (71.5\")   SpO2 95%   BMI 34.38 kg/m²     Physical Exam  Vitals and nursing note reviewed.   Constitutional:       Appearance: Normal appearance. He is well-developed and well-groomed.      Comments: Wearing left knee brace.  Ambulating with favored gait   HENT:      Head: Normocephalic and atraumatic.      Jaw: There is normal jaw occlusion.      Right Ear: Hearing, tympanic membrane, ear canal and external ear normal. There is impacted cerumen.      Left Ear: Hearing, tympanic membrane, ear canal and external ear normal. There is impacted cerumen.      Ears:      Comments: After bilateral ear lavage, he had normal ear exam.     Nose: Nose normal.      Right Sinus: No maxillary sinus tenderness or frontal sinus tenderness.      Left Sinus: No maxillary sinus tenderness or frontal sinus tenderness.      Mouth/Throat:      Lips: Pink.      Mouth: Mucous membranes are moist.      Dentition: Normal dentition.      Tongue: No lesions.      Pharynx: Oropharynx is " clear. Uvula midline.      Tonsils: No tonsillar exudate.   Eyes:      General: Lids are normal.      Conjunctiva/sclera: Conjunctivae normal.      Pupils: Pupils are equal, round, and reactive to light.   Neck:      Vascular: No carotid bruit.      Trachea: Trachea and phonation normal. No tracheal tenderness.   Cardiovascular:      Rate and Rhythm: Normal rate and regular rhythm.      Pulses: Normal pulses.      Heart sounds: Normal heart sounds, S1 normal and S2 normal. No murmur heard.  Pulmonary:      Effort: Pulmonary effort is normal.      Breath sounds: Normal breath sounds and air entry.   Abdominal:      General: Bowel sounds are normal.      Palpations: Abdomen is soft.      Tenderness: There is no abdominal tenderness.   Musculoskeletal:      Cervical back: Neck supple.      Right lower leg: No edema.      Left lower leg: No edema.   Lymphadenopathy:      Cervical: No cervical adenopathy.      Right cervical: No superficial, deep or posterior cervical adenopathy.     Left cervical: No superficial, deep or posterior cervical adenopathy.   Skin:     General: Skin is warm and dry.      Capillary Refill: Capillary refill takes less than 2 seconds.   Neurological:      Mental Status: He is alert and oriented to person, place, and time.   Psychiatric:         Attention and Perception: Attention and perception normal.         Mood and Affect: Mood and affect normal.         Speech: Speech normal.         Behavior: Behavior is cooperative.         Thought Content: Thought content normal.         Cognition and Memory: Cognition and memory normal.         Judgment: Judgment normal.        Result Review :          Ear Cerumen Removal    Date/Time: 2/28/2024 11:34 AM    Performed by: Claire Jerry PA-C  Authorized by: Claire Jerry PA-C  Consent: Verbal consent obtained. Written consent not obtained.  Risks and benefits: risks, benefits and alternatives were discussed  Consent given by: patient  Patient  "understanding: patient states understanding of the procedure being performed  Patient consent: the patient's understanding of the procedure matches consent given  Procedure consent: procedure consent matches procedure scheduled  Relevant documents: relevant documents present and verified  Test results: test results not available  Site marked: the operative site was marked  Imaging studies: imaging studies not available  Patient identity confirmed: verbally with patient  Time out: Immediately prior to procedure a \"time out\" was called to verify the correct patient, procedure, equipment, support staff and site/side marked as required.    Anesthesia:  Local Anesthetic: none  Location details: right ear and left ear  Patient tolerance: patient tolerated the procedure well with no immediate complications  Comments: Bilateral ear lavage was performed by Faith Jaffe MA.  I have examined ears after ear lavage.  Had total resolution of cerumen impaction.  Instructed not to use Q-tips  Procedure type: irrigation           Assessment and Plan    Diagnoses and all orders for this visit:    1. Pre-op examination (Primary)    2. Bilateral impacted cerumen  -     Ear Cerumen Removal    Nabor was seen in office today for presurgical clearance for upcoming left knee surgery on March 5, 2024.  He is medically cleared for upcoming surgery pending any abnormalities on EKG and lab work collected at [facility] on March 1, 2024.  Will send clearance letter to his orthopedist, Dr. Zarate.  He was noted to have bilateral cerumen impaction during exam which was removed by ear lavage in office today.    I spent 24 minutes caring for Nabor on this date of service. This time includes time spent by me in the following activities:preparing for the visit, obtaining and/or reviewing a separately obtained history, performing a medically appropriate examination and/or evaluation , counseling and educating the " patient/family/caregiver, and documenting information in the medical record  Follow Up   Return if symptoms worsen or fail to improve.  Patient was given instructions and counseling regarding his condition or for health maintenance advice. Please see specific information pulled into the AVS if appropriate.     [facility]  [address] [place] [id number]  Dept: [phone]  Dept Fax: [phone]  Loc: [phone]  Loc Fax: [phone]

## 2024-03-01 ENCOUNTER — PRE-ADMISSION TESTING (OUTPATIENT)
Dept: PREADMISSION TESTING | Facility: HOSPITAL | Age: 69
End: 2024-03-01
Payer: COMMERCIAL

## 2024-03-01 ENCOUNTER — ANESTHESIA EVENT (OUTPATIENT)
Dept: PERIOP | Facility: HOSPITAL | Age: 69
End: 2024-03-01
Payer: COMMERCIAL

## 2024-03-01 VITALS
DIASTOLIC BLOOD PRESSURE: 90 MMHG | OXYGEN SATURATION: 95 % | SYSTOLIC BLOOD PRESSURE: 140 MMHG | WEIGHT: 250 LBS | HEIGHT: 71 IN | HEART RATE: 63 BPM | BODY MASS INDEX: 35 KG/M2 | RESPIRATION RATE: 16 BRPM

## 2024-03-01 DIAGNOSIS — M84.469A INSUFFICIENCY FRACTURE OF TIBIA, INITIAL ENCOUNTER: ICD-10-CM

## 2024-03-01 DIAGNOSIS — M84.452A: ICD-10-CM

## 2024-03-01 DIAGNOSIS — S83.232A COMPLEX TEAR OF MEDIAL MENISCUS OF LEFT KNEE AS CURRENT INJURY, INITIAL ENCOUNTER: ICD-10-CM

## 2024-03-01 LAB
ANION GAP SERPL CALCULATED.3IONS-SCNC: 10.9 MMOL/L (ref 5–15)
APTT PPP: 26.9 SECONDS (ref 24.3–38.1)
BASOPHILS # BLD AUTO: 0.05 10*3/MM3 (ref 0–0.2)
BASOPHILS NFR BLD AUTO: 0.8 % (ref 0–1.5)
BUN SERPL-MCNC: 22 MG/DL (ref 8–23)
BUN/CREAT SERPL: 18.5 (ref 7–25)
CALCIUM SPEC-SCNC: 8.9 MG/DL (ref 8.6–10.5)
CHLORIDE SERPL-SCNC: 100 MMOL/L (ref 98–107)
CO2 SERPL-SCNC: 23.1 MMOL/L (ref 22–29)
CREAT SERPL-MCNC: 1.19 MG/DL (ref 0.76–1.27)
DEPRECATED RDW RBC AUTO: 40.9 FL (ref 37–54)
EGFRCR SERPLBLD CKD-EPI 2021: 65.7 ML/MIN/1.73
EOSINOPHIL # BLD AUTO: 0.14 10*3/MM3 (ref 0–0.4)
EOSINOPHIL NFR BLD AUTO: 2.2 % (ref 0.3–6.2)
ERYTHROCYTE [DISTWIDTH] IN BLOOD BY AUTOMATED COUNT: 12.5 % (ref 12.3–15.4)
GLUCOSE SERPL-MCNC: 130 MG/DL (ref 65–99)
HBA1C MFR BLD: 5.3 % (ref 4.8–5.6)
HCT VFR BLD AUTO: 44.1 % (ref 37.5–51)
HGB BLD-MCNC: 14.7 G/DL (ref 13–17.7)
IMM GRANULOCYTES # BLD AUTO: 0.01 10*3/MM3 (ref 0–0.05)
IMM GRANULOCYTES NFR BLD AUTO: 0.2 % (ref 0–0.5)
INR PPP: 0.97 (ref 0.9–1.1)
LYMPHOCYTES # BLD AUTO: 1.74 10*3/MM3 (ref 0.7–3.1)
LYMPHOCYTES NFR BLD AUTO: 27.8 % (ref 19.6–45.3)
MCH RBC QN AUTO: 29.8 PG (ref 26.6–33)
MCHC RBC AUTO-ENTMCNC: 33.3 G/DL (ref 31.5–35.7)
MCV RBC AUTO: 89.5 FL (ref 79–97)
MONOCYTES # BLD AUTO: 0.58 10*3/MM3 (ref 0.1–0.9)
MONOCYTES NFR BLD AUTO: 9.3 % (ref 5–12)
NEUTROPHILS NFR BLD AUTO: 3.73 10*3/MM3 (ref 1.7–7)
NEUTROPHILS NFR BLD AUTO: 59.7 % (ref 42.7–76)
PLATELET # BLD AUTO: 224 10*3/MM3 (ref 140–450)
PMV BLD AUTO: 10.5 FL (ref 6–12)
POTASSIUM SERPL-SCNC: 3.8 MMOL/L (ref 3.5–5.2)
PROTHROMBIN TIME: 12.9 SECONDS (ref 12.1–15)
QT INTERVAL: 435 MS
QTC INTERVAL: 454 MS
RBC # BLD AUTO: 4.93 10*6/MM3 (ref 4.14–5.8)
SODIUM SERPL-SCNC: 134 MMOL/L (ref 136–145)
WBC NRBC COR # BLD AUTO: 6.25 10*3/MM3 (ref 3.4–10.8)

## 2024-03-01 PROCEDURE — 93005 ELECTROCARDIOGRAM TRACING: CPT

## 2024-03-01 PROCEDURE — 85025 COMPLETE CBC W/AUTO DIFF WBC: CPT | Performed by: ORTHOPAEDIC SURGERY

## 2024-03-01 PROCEDURE — 80048 BASIC METABOLIC PNL TOTAL CA: CPT | Performed by: ORTHOPAEDIC SURGERY

## 2024-03-01 PROCEDURE — 93010 ELECTROCARDIOGRAM REPORT: CPT | Performed by: INTERNAL MEDICINE

## 2024-03-01 PROCEDURE — 83036 HEMOGLOBIN GLYCOSYLATED A1C: CPT | Performed by: ORTHOPAEDIC SURGERY

## 2024-03-01 PROCEDURE — 85730 THROMBOPLASTIN TIME PARTIAL: CPT | Performed by: ORTHOPAEDIC SURGERY

## 2024-03-01 PROCEDURE — 85610 PROTHROMBIN TIME: CPT | Performed by: ORTHOPAEDIC SURGERY

## 2024-03-01 PROCEDURE — 36415 COLL VENOUS BLD VENIPUNCTURE: CPT

## 2024-03-01 RX ORDER — OMEPRAZOLE 20 MG/1
20 CAPSULE, DELAYED RELEASE ORAL DAILY
COMMUNITY

## 2024-03-01 NOTE — DISCHARGE INSTRUCTIONS
PRE-ADMISSION TESTING INSTRUCTIONS FOR ADULTS    Take these medications the morning of surgery with a small sip of water:  omeprazole      Do not take any insulin or diabetes medications the morning of surgery.      No aspirin, advil, aleve, ibuprofen, naproxen, diet pills, decongestants, or herbal/vitamins for a week prior to surgery.       Tylenol/Acetaminophen is okay to take if needed.    General Instructions:    DO NOT EAT SOLID FOOD AFTER MIDNIGHT THE NIGHT BEFORE SURGERY. No gum, mints, or hard candy after midnight the night before surgery.  You may drink clear liquids the day of surgery up until 2 hours before your arrival time.  Clear liquids are liquids you can see through. Nothing RED in color.    Plain water    Sports drinks      Gelatin (Jell-O)  Fruit juices without pulp such as white grape juice and apple juice  Popsicles that contain no fruit or yogurt  Tea or coffee (no cream or milk added)    It is beneficial for you to have a clear drink that contains carbohydrates 2 hours before your arrival time.  We suggest a 20 ounce bottle of Gatorade or Powerade for non-diabetic patients or a 20 ounce bottle of Gatorade Zero or Powerade Zero for diabetic patients.     Patients who avoid smoking, chewing tobacco and alcohol for 4 weeks prior to surgery have a reduced risk of post-operative complications.  If at all possible, quit smoking as many days before surgery as you can.    Do not smoke, use chewing tobacco or drink alcohol the day of surgery    Bring your C-PAP/ BI-PAP machine if you use one.  Wear clean comfortable clothes.  Do not wear contact lenses, lotion, deodorant, or make-up.  Bring a case for your glasses if applicable. You may brush your teeth the morning of surgery.  You may wear dentures/partials, do not put adhesive/glue on them.  Leave all other jewelry and valuables at home.      Preventing a Surgical Site Infection:    Shower the night before and on the morning of surgery using the  chlorhexidine soap you were given.  Use a clean washcloth with the soap.  Place clean sheets on your bed after showering the night before surgery. Do not use the CHG soap on your hair, face, or private areas. Wash your body gently for five (5) minutes. Do not scrub your skin.  Dry with a clean towel and dress in clean clothing.  Do not shave the surgical area for 10 days-2 weeks prior to surgery  because the razor can irritate skin and make it easier to develop an infection.  Make sure you, your family, and all healthcare providers clean their hands with soap and water or an alcohol based hand  before caring for you or your wound.      Day of surgery:    Your surgeon’s office will advise you of your arrival time for the day of surgery.    Upon arrival, a Pre-op nurse and Anesthesia provider will review your health history, obtain vital signs, and answer questions you may have. The anesthesia provider will also discuss the type of anesthesia that will be needed for your procedure, which may include general anesthesia. The only belongings needed at this time will be your home medications and if applicable your C-PAP/BI-PAP machine.  If you are staying overnight your family can leave the rest of your belongings in the car and bring them to your room later.  A Pre-op nurse will start an IV and you may receive medication in preparation for surgery, including something to help you relax.  Your family will be able to see you in the Pre-op area.  While you are in surgery your family should notify the waiting room  if they leave the waiting room area and provide a contact phone number.    IF you have any questions, you can call the Pre-Admission Department at [phone] or your surgeon's office.  Notify your surgeon if  you become sick, have a fever, productive cough, or cannot be here the day of surgery    Please be aware that surgery does come with discomfort.  We want to make every effort to  control your discomfort so please discuss any uncontrolled symptoms with your nurse.   Your doctor will most likely have prescribed pain medications.      If you are going home after surgery, you will receive individualized written care instructions before being discharged.  A responsible adult (over the age of 18) must drive you to and from the hospital on the day of your surgery and stay with you for 24 hours after anesthesia.    If you are staying overnight following surgery, you will be transported to your hospital room following the recovery period.  [facility] has all private rooms.    You may receive a survey regarding the care you received. Your feedback is very important and will be used to collect the necessary data to help us to continue to provide excellent care.     Deductibles and co-payments are collected on the day of service. Please be prepared to pay the required co-pay, deductible or deposit on the day of service as defined by your plan.

## 2024-03-05 ENCOUNTER — HOSPITAL ENCOUNTER (OUTPATIENT)
Facility: HOSPITAL | Age: 69
Setting detail: HOSPITAL OUTPATIENT SURGERY
Discharge: HOME | End: 2024-03-05
Attending: ORTHOPAEDIC SURGERY | Admitting: ORTHOPAEDIC SURGERY
Payer: COMMERCIAL

## 2024-03-05 ENCOUNTER — APPOINTMENT (OUTPATIENT)
Dept: GENERAL RADIOLOGY | Facility: HOSPITAL | Age: 69
End: 2024-03-05
Payer: COMMERCIAL

## 2024-03-05 ENCOUNTER — ANESTHESIA (OUTPATIENT)
Dept: PERIOP | Facility: HOSPITAL | Age: 69
End: 2024-03-05
Payer: COMMERCIAL

## 2024-03-05 VITALS
HEART RATE: 59 BPM | DIASTOLIC BLOOD PRESSURE: 87 MMHG | SYSTOLIC BLOOD PRESSURE: 132 MMHG | RESPIRATION RATE: 12 BRPM | WEIGHT: 271.2 LBS | BODY MASS INDEX: 36.73 KG/M2 | TEMPERATURE: 97.9 F | HEIGHT: 72 IN | OXYGEN SATURATION: 97 %

## 2024-03-05 DIAGNOSIS — M84.469A INSUFFICIENCY FRACTURE OF TIBIA, INITIAL ENCOUNTER: ICD-10-CM

## 2024-03-05 DIAGNOSIS — S83.232A COMPLEX TEAR OF MEDIAL MENISCUS OF LEFT KNEE AS CURRENT INJURY, INITIAL ENCOUNTER: ICD-10-CM

## 2024-03-05 DIAGNOSIS — M84.452A: ICD-10-CM

## 2024-03-05 PROCEDURE — 25010000002 HYDROMORPHONE 1 MG/ML SOLUTION: Performed by: NURSE ANESTHETIST, CERTIFIED REGISTERED

## 2024-03-05 PROCEDURE — 25010000002 PROPOFOL 10 MG/ML EMULSION: Performed by: NURSE ANESTHETIST, CERTIFIED REGISTERED

## 2024-03-05 PROCEDURE — 25010000002 BUPIVACAINE (PF) 0.25 % SOLUTION: Performed by: NURSE ANESTHETIST, CERTIFIED REGISTERED

## 2024-03-05 PROCEDURE — 25810000003 LACTATED RINGERS PER 1000 ML: Performed by: NURSE ANESTHETIST, CERTIFIED REGISTERED

## 2024-03-05 PROCEDURE — 0 BUPIVACAINE LIPOSOME 1.3 % SUSPENSION: Performed by: ORTHOPAEDIC SURGERY

## 2024-03-05 PROCEDURE — C1713 ANCHOR/SCREW BN/BN,TIS/BN: HCPCS | Performed by: ORTHOPAEDIC SURGERY

## 2024-03-05 PROCEDURE — C9290 INJ, BUPIVACAINE LIPOSOME: HCPCS | Performed by: ORTHOPAEDIC SURGERY

## 2024-03-05 PROCEDURE — 76000 FLUOROSCOPY <1 HR PHYS/QHP: CPT

## 2024-03-05 PROCEDURE — 25010000002 CEFAZOLIN PER 500 MG: Performed by: ORTHOPAEDIC SURGERY

## 2024-03-05 PROCEDURE — 25010000002 ONDANSETRON PER 1 MG: Performed by: NURSE ANESTHETIST, CERTIFIED REGISTERED

## 2024-03-05 PROCEDURE — 73560 X-RAY EXAM OF KNEE 1 OR 2: CPT

## 2024-03-05 PROCEDURE — 25010000002 MIDAZOLAM PER 1MG: Performed by: NURSE ANESTHETIST, CERTIFIED REGISTERED

## 2024-03-05 PROCEDURE — 25010000002 DEXAMETHASONE PER 1 MG: Performed by: NURSE ANESTHETIST, CERTIFIED REGISTERED

## 2024-03-05 DEVICE — CMT BONE 5CC: Type: IMPLANTABLE DEVICE | Site: KNEE | Status: FUNCTIONAL

## 2024-03-05 DEVICE — KT KN SCP W/ACCUPORT DS: Type: IMPLANTABLE DEVICE | Site: KNEE | Status: FUNCTIONAL

## 2024-03-05 RX ORDER — LIDOCAINE HYDROCHLORIDE 10 MG/ML
0.5 INJECTION, SOLUTION INFILTRATION; PERINEURAL ONCE AS NEEDED
Status: DISCONTINUED | OUTPATIENT
Start: 2024-03-05 | End: 2024-03-05 | Stop reason: HOSPADM

## 2024-03-05 RX ORDER — OXYCODONE HCL 10 MG/1
10 TABLET, FILM COATED, EXTENDED RELEASE ORAL ONCE
Status: COMPLETED | OUTPATIENT
Start: 2024-03-05 | End: 2024-03-05

## 2024-03-05 RX ORDER — PROPOFOL 10 MG/ML
VIAL (ML) INTRAVENOUS AS NEEDED
Status: DISCONTINUED | OUTPATIENT
Start: 2024-03-05 | End: 2024-03-05 | Stop reason: SURG

## 2024-03-05 RX ORDER — SODIUM CHLORIDE 0.9 % (FLUSH) 0.9 %
10 SYRINGE (ML) INJECTION AS NEEDED
Status: DISCONTINUED | OUTPATIENT
Start: 2024-03-05 | End: 2024-03-05 | Stop reason: HOSPADM

## 2024-03-05 RX ORDER — DEXMEDETOMIDINE HYDROCHLORIDE 100 UG/ML
INJECTION, SOLUTION INTRAVENOUS AS NEEDED
Status: DISCONTINUED | OUTPATIENT
Start: 2024-03-05 | End: 2024-03-05 | Stop reason: SURG

## 2024-03-05 RX ORDER — DEXAMETHASONE SODIUM PHOSPHATE 4 MG/ML
8 INJECTION, SOLUTION INTRA-ARTICULAR; INTRALESIONAL; INTRAMUSCULAR; INTRAVENOUS; SOFT TISSUE ONCE AS NEEDED
Status: COMPLETED | OUTPATIENT
Start: 2024-03-05 | End: 2024-03-05

## 2024-03-05 RX ORDER — MELOXICAM 7.5 MG/1
15 TABLET ORAL ONCE
Status: COMPLETED | OUTPATIENT
Start: 2024-03-05 | End: 2024-03-05

## 2024-03-05 RX ORDER — MIDAZOLAM HYDROCHLORIDE 2 MG/2ML
0.5 INJECTION, SOLUTION INTRAMUSCULAR; INTRAVENOUS
Status: COMPLETED | OUTPATIENT
Start: 2024-03-05 | End: 2024-03-05

## 2024-03-05 RX ORDER — ONDANSETRON 2 MG/ML
4 INJECTION INTRAMUSCULAR; INTRAVENOUS ONCE AS NEEDED
Status: COMPLETED | OUTPATIENT
Start: 2024-03-05 | End: 2024-03-05

## 2024-03-05 RX ORDER — SODIUM CHLORIDE 9 MG/ML
40 INJECTION, SOLUTION INTRAVENOUS AS NEEDED
Status: DISCONTINUED | OUTPATIENT
Start: 2024-03-05 | End: 2024-03-05 | Stop reason: HOSPADM

## 2024-03-05 RX ORDER — OXYCODONE HYDROCHLORIDE AND ACETAMINOPHEN 5; 325 MG/1; MG/1
1-2 TABLET ORAL EVERY 4 HOURS PRN
Qty: 42 TABLET | Refills: 0 | Status: SHIPPED | OUTPATIENT
Start: 2024-03-05

## 2024-03-05 RX ORDER — ACETAMINOPHEN 500 MG
1000 TABLET ORAL ONCE
Status: COMPLETED | OUTPATIENT
Start: 2024-03-05 | End: 2024-03-05

## 2024-03-05 RX ORDER — SODIUM CHLORIDE, SODIUM LACTATE, POTASSIUM CHLORIDE, CALCIUM CHLORIDE 600; 310; 30; 20 MG/100ML; MG/100ML; MG/100ML; MG/100ML
9 INJECTION, SOLUTION INTRAVENOUS CONTINUOUS
Status: DISCONTINUED | OUTPATIENT
Start: 2024-03-05 | End: 2024-03-05 | Stop reason: HOSPADM

## 2024-03-05 RX ORDER — BUPIVACAINE HYDROCHLORIDE 2.5 MG/ML
INJECTION, SOLUTION EPIDURAL; INFILTRATION; INTRACAUDAL
Status: COMPLETED | OUTPATIENT
Start: 2024-03-05 | End: 2024-03-05

## 2024-03-05 RX ORDER — ONDANSETRON 2 MG/ML
4 INJECTION INTRAMUSCULAR; INTRAVENOUS ONCE AS NEEDED
Status: DISCONTINUED | OUTPATIENT
Start: 2024-03-05 | End: 2024-03-05 | Stop reason: HOSPADM

## 2024-03-05 RX ORDER — NALOXONE HYDROCHLORIDE 4 MG/.1ML
SPRAY NASAL
Qty: 2 EACH | Refills: 0 | Status: SHIPPED | OUTPATIENT
Start: 2024-03-05

## 2024-03-05 RX ORDER — ONDANSETRON 4 MG/1
4 TABLET, FILM COATED ORAL EVERY 8 HOURS PRN
Qty: 20 TABLET | Refills: 0 | Status: SHIPPED | OUTPATIENT
Start: 2024-03-05

## 2024-03-05 RX ORDER — SODIUM CHLORIDE 0.9 % (FLUSH) 0.9 %
10 SYRINGE (ML) INJECTION EVERY 12 HOURS SCHEDULED
Status: DISCONTINUED | OUTPATIENT
Start: 2024-03-05 | End: 2024-03-05 | Stop reason: HOSPADM

## 2024-03-05 RX ORDER — LIDOCAINE HYDROCHLORIDE 20 MG/ML
INJECTION, SOLUTION INFILTRATION; PERINEURAL AS NEEDED
Status: DISCONTINUED | OUTPATIENT
Start: 2024-03-05 | End: 2024-03-05 | Stop reason: SURG

## 2024-03-05 RX ORDER — SODIUM CHLORIDE, SODIUM LACTATE, POTASSIUM CHLORIDE, CALCIUM CHLORIDE 600; 310; 30; 20 MG/100ML; MG/100ML; MG/100ML; MG/100ML
100 INJECTION, SOLUTION INTRAVENOUS ONCE
Status: DISCONTINUED | OUTPATIENT
Start: 2024-03-05 | End: 2024-03-05 | Stop reason: HOSPADM

## 2024-03-05 RX ORDER — ASPIRIN 81 MG/1
81 TABLET ORAL 2 TIMES DAILY
Qty: 60 TABLET | Refills: 0 | Status: SHIPPED | OUTPATIENT
Start: 2024-03-05

## 2024-03-05 RX ORDER — OXYCODONE HYDROCHLORIDE AND ACETAMINOPHEN 5; 325 MG/1; MG/1
1 TABLET ORAL ONCE AS NEEDED
Status: COMPLETED | OUTPATIENT
Start: 2024-03-05 | End: 2024-03-05

## 2024-03-05 RX ORDER — FAMOTIDINE 10 MG/ML
20 INJECTION, SOLUTION INTRAVENOUS
Status: COMPLETED | OUTPATIENT
Start: 2024-03-05 | End: 2024-03-05

## 2024-03-05 RX ORDER — KETAMINE HCL IN NACL, ISO-OSM 100MG/10ML
SYRINGE (ML) INJECTION AS NEEDED
Status: DISCONTINUED | OUTPATIENT
Start: 2024-03-05 | End: 2024-03-05 | Stop reason: SURG

## 2024-03-05 RX ADMIN — Medication 25 MG: at 10:15

## 2024-03-05 RX ADMIN — ACETAMINOPHEN 1000 MG: 500 TABLET ORAL at 08:33

## 2024-03-05 RX ADMIN — DEXMEDETOMIDINE 8 MCG: 100 INJECTION, SOLUTION INTRAVENOUS at 10:32

## 2024-03-05 RX ADMIN — DEXMEDETOMIDINE 8 MCG: 100 INJECTION, SOLUTION INTRAVENOUS at 10:15

## 2024-03-05 RX ADMIN — OXYCODONE HYDROCHLORIDE AND ACETAMINOPHEN 1 TABLET: 5; 325 TABLET ORAL at 12:44

## 2024-03-05 RX ADMIN — ONDANSETRON 4 MG: 2 INJECTION INTRAMUSCULAR; INTRAVENOUS at 08:36

## 2024-03-05 RX ADMIN — MIDAZOLAM HYDROCHLORIDE 0.5 MG: 1 INJECTION, SOLUTION INTRAMUSCULAR; INTRAVENOUS at 08:45

## 2024-03-05 RX ADMIN — HYDROMORPHONE HYDROCHLORIDE 0.5 MG: 1 INJECTION, SOLUTION INTRAMUSCULAR; INTRAVENOUS; SUBCUTANEOUS at 13:00

## 2024-03-05 RX ADMIN — PROPOFOL 200 MG: 10 INJECTION, EMULSION INTRAVENOUS at 10:02

## 2024-03-05 RX ADMIN — BUPIVACAINE HYDROCHLORIDE 15 ML: 2.5 INJECTION, SOLUTION EPIDURAL; INFILTRATION; INTRACAUDAL; PERINEURAL at 08:57

## 2024-03-05 RX ADMIN — OXYCODONE HYDROCHLORIDE 10 MG: 10 TABLET, FILM COATED, EXTENDED RELEASE ORAL at 08:34

## 2024-03-05 RX ADMIN — BUPIVACAINE HYDROCHLORIDE 15 ML: 2.5 INJECTION, SOLUTION EPIDURAL; INFILTRATION; INTRACAUDAL at 09:02

## 2024-03-05 RX ADMIN — Medication 25 MG: at 10:43

## 2024-03-05 RX ADMIN — MELOXICAM 15 MG: 7.5 TABLET ORAL at 08:34

## 2024-03-05 RX ADMIN — MIDAZOLAM HYDROCHLORIDE 0.5 MG: 1 INJECTION, SOLUTION INTRAMUSCULAR; INTRAVENOUS at 08:49

## 2024-03-05 RX ADMIN — LIDOCAINE HYDROCHLORIDE 100 MG: 20 INJECTION, SOLUTION INFILTRATION; PERINEURAL at 10:02

## 2024-03-05 RX ADMIN — CEFAZOLIN 3000 MG: 2 INJECTION, POWDER, FOR SOLUTION INTRAVENOUS at 10:00

## 2024-03-05 RX ADMIN — HYDROMORPHONE HYDROCHLORIDE 0.5 MG: 1 INJECTION, SOLUTION INTRAMUSCULAR; INTRAVENOUS; SUBCUTANEOUS at 12:15

## 2024-03-05 RX ADMIN — FAMOTIDINE 20 MG: 10 INJECTION, SOLUTION INTRAVENOUS at 08:35

## 2024-03-05 RX ADMIN — DEXAMETHASONE SODIUM PHOSPHATE 8 MG: 4 INJECTION, SOLUTION INTRAMUSCULAR; INTRAVENOUS at 08:35

## 2024-03-05 RX ADMIN — HYDROMORPHONE HYDROCHLORIDE 0.5 MG: 1 INJECTION, SOLUTION INTRAMUSCULAR; INTRAVENOUS; SUBCUTANEOUS at 12:25

## 2024-03-05 RX ADMIN — DEXMEDETOMIDINE 4 MCG: 100 INJECTION, SOLUTION INTRAVENOUS at 10:49

## 2024-03-05 RX ADMIN — SODIUM CHLORIDE, POTASSIUM CHLORIDE, SODIUM LACTATE AND CALCIUM CHLORIDE 9 ML/HR: 600; 310; 30; 20 INJECTION, SOLUTION INTRAVENOUS at 08:31

## 2024-03-05 NOTE — ANESTHESIA PREPROCEDURE EVALUATION
Anesthesia Evaluation     Patient summary reviewed and Nursing notes reviewed   NPO Solid Status: > 8 hours  NPO Liquid Status: > 8 hours           Airway   Mallampati: III  TM distance: >3 FB  Neck ROM: full  Large neck circumference  Dental - normal exam     Pulmonary - normal exam    breath sounds clear to auscultation  (+) ,sleep apnea on CPAP  Cardiovascular - normal exam  Exercise tolerance: good (4-7 METS)    ECG reviewed  Rhythm: regular  Rate: normal    (+) hypertension 2 medications or greater    ROS comment: HEART RATE= 65  bpm  RR Interval= 920  ms  DC Interval= 180  ms  P Horizontal Axis= -22  deg  P Front Axis= 51  deg  QRSD Interval= 107  ms  QT Interval= 435  ms  QTcB= 454  ms  QRS Axis= -26  deg  T Wave Axis= -15  deg  - BORDERLINE ECG -  Sinus rhythm  Borderline left axis deviation  Borderline T abnormalities, inferior leads  When compared with ECG of 16-Jul-2021 8:08:06,  No significant change   Electronically Signed By: Harman Levine ([facility]) 01-Mar-2024 16:50:32  Date and Time of Study: 2024-03-01 08:21:09        Neuro/Psych  (+) numbness    ROS Comment: Numbness, tingling right leg  GI/Hepatic/Renal/Endo    (+) GERD well controlled    Musculoskeletal     (+) back pain      ROS comment: Lumbar spinal stenosis   Abdominal   (+) obese    Abdomen: soft.  Bowel sounds: normal.   Substance History   (+) alcohol use      Comment: socially   OB/GYN negative ob/gyn ROS         Other   arthritis,                   Anesthesia Plan    ASA 2     general with block and regional     intravenous induction     Anesthetic plan, risks, benefits, and alternatives have been provided, discussed and informed consent has been obtained with: patient.    Use of blood products discussed with patient  Consented to blood products.    Plan discussed with CRNA.    CODE STATUS:

## 2024-03-05 NOTE — ANESTHESIA PROCEDURE NOTES
Peripheral Block      Patient reassessed immediately prior to procedure    Patient location during procedure: pre-op  Start time: 3/5/2024 8:59 AM  Stop time: 3/5/2024 9:02 AM  Reason for block: at surgeon's request, post-op pain management and secondary anesthetic  Performed by  CRNA/CAA: Belinda Zepeda CRNA  Preanesthetic Checklist  Completed: patient identified, IV checked, site marked, risks and benefits discussed, surgical consent, monitors and equipment checked, pre-op evaluation and timeout performed  Prep:  Pt Position: supine  Sterile barriers:cap, gloves, mask, washed/disinfected hands and alcohol skin prep  Prep: ChloraPrep  Patient monitoring: blood pressure monitoring, continuous pulse oximetry and EKG  Procedure    Sedation: yes  Performed under: local infiltration  Guidance:ultrasound guided    ULTRASOUND INTERPRETATION.  Using ultrasound guidance a 21 G gauge needle was placed in close proximity to the nerve, at which point, under ultrasound guidance anesthetic was injected in the area of the nerve and spread of the anesthesia was seen on ultrasound in close proximity thereto.  There were no abnormalities seen on ultrasound; a digital image was taken; and the patient tolerated the procedure with no complications. Images:still images obtained, printed/placed on chart    Laterality:left  Block Type:iPack  Injection Technique:single-shot  Needle Type:echogenic  Needle Gauge:21 G  Resistance on Injection: none    Medications Used: bupivacaine PF (MARCAINE) injection 0.25% - Injection   15 mL - 3/5/2024 9:02:00 AM      Post Assessment  Injection Assessment: negative aspiration for heme, no paresthesia on injection and incremental injection  Patient Tolerance:comfortable throughout block  Complications:no

## 2024-03-05 NOTE — H&P
History & Physical       Patient: Nabor Muhammad Dr.    Proposed Surgery and date: Procedure(s) (LRB):  KNEE ARTHROSCOPY WITH OPEN ARTHROTOMY SUBCHONDROPLASTY (Left)     YOB: 1955    Medical Record Number: [id number]    Surgeon:  Dr. Terry Zarate        Chief Complaints: Left knee pain, chondromalacia, complex medial meniscus tear, subchondral insufficiency fracture medial tibial plateau and medial femoral condyle       History of Present Illness: 68 y.o. male presents with left knee pain, chondromalacia, complex medial meniscus tear, subchondral insufficiency fracture medial tibial plateau and medial femoral condyle. Onset of symptoms was greater than 6 months ago and has been progressively worsening despite more conservative treatment measures.  Symptoms are associated with ability to move, exercise, and perform activities of daily living.  Symptoms are aggravated by weight bearing and ROM necessary for activities of daily living.   Symptoms improve with rest, ice and elevation only minimally.      Allergies:   Allergies   Allergen Reactions    Codeine GI Intolerance       Medications:   Home Medications:  No current facility-administered medications on file prior to encounter.     Current Outpatient Medications on File Prior to Encounter   Medication Sig    finasteride (PROSCAR) 5 MG tablet Take 1 tablet by mouth Daily.    HYDROcodone-acetaminophen (NORCO)  MG per tablet Take 1 tablet by mouth Every 4 (Four) Hours As Needed for Moderate Pain or Severe Pain.    olmesartan-hydrochlorothiazide (BENICAR HCT) 40-25 MG per tablet Take 1 tablet by mouth Daily.    tamsulosin (FLOMAX) 0.4 MG capsule 24 hr capsule Take 2 capsules by mouth Daily.    vitamin D3 125 MCG (5000 UT) capsule capsule Take 1 capsule by mouth Daily.     Current Medications:  Scheduled Meds:acetaminophen, 1,000 mg, Oral, Once  ceFAZolin, 2,000 mg, Intravenous, Once  ethyl alcohol, 2 Swab, Nasal, Once  famotidine, 20 mg,  Intravenous, 60 Min Pre-Op  meloxicam, 15 mg, Oral, Once  oxyCODONE, 10 mg, Oral, Once  sodium chloride, 10 mL, Intravenous, Q12H      Continuous Infusions:lactated ringers, 9 mL/hr      PRN Meds:.  dexAMETHasone    lidocaine    midazolam    ondansetron    sodium chloride    sodium chloride    Past Medical History:   Diagnosis Date    Arthritis     Hypertension     Lumbosacral disc disease     OA (osteoarthritis)     YANETH (obstructive sleep apnea)     CPAP    Spinal stenosis of lumbar region         Past Surgical History:   Procedure Laterality Date    CHOLECYSTECTOMY      FEMUR CLOSED REDUCTION Left 1970    KNEE MENISCECTOMY Right 7/21/2021    Procedure: KNEE PARTIAL MEDIAL MENISCECTOMY,;  Surgeon: Terry Zarate MD;  Location: [facility];  Service: Orthopedics;  Laterality: Right;    SHOULDER SURGERY Right 1998    TONSILLECTOMY AND ADENOIDECTOMY      WRIST GANGLION EXCISION Bilateral     R-1990 L-1996        Social History     Occupational History    Not on file   Tobacco Use    Smoking status: Never     Passive exposure: Never    Smokeless tobacco: Never   Vaping Use    Vaping status: Never Used   Substance and Sexual Activity    Alcohol use: Yes     Comment: occ    Drug use: No    Sexual activity: Defer      Social History     Social History Narrative    Not on file        Family History   Problem Relation Age of Onset    Breast cancer Mother     COPD Father     Malig Hyperthermia Neg Hx          Review of Systems:   HEENT: Patient denies any headaches, vision changes, epistaxis, dental problems, sore throat, hoarseness, or dysphagia  Pulmonary: Patient denies any cough, congestion, SOA, or wheezing  Cardiovascular: Patient denies any chest pain, dyspnea, palpitations.  Gastrointestinal:  Patient denies nausea, vomiting, diarrhea, loss of appetite, change in appetite, dysphagia, melena.  Genital/Urinary: Patient denies dysuria, change in color of urine, change in frequency of urination, pain with  urgency.  Musculoskeletal: Patient reports some limitation of function, crepitation, and pain in the affected joint or extremity, especially with movement.  Neurological: Patient denies dizziness, ataxia, difficulty in speaking, change in speech, seizures, syncope.  Endocrine system: Patient denies palpitations, intolerance of heat or cold, polyuria, polydipsia, polyphagia, exophthalmos, or goiter.  Psychological: Patient denies thoughts/plans or harming self or other; depression,  night terrors, theresa, disorientation.  Skin: Patient denies any open wounds, ulcers, decubiti.  Hematopoietic: Patient denies history of spontaneous or excessive bleeding, epistaxis, hematuria, melena, enlarged or tender lymph nodes, pallor.    Physical Exam: 68 y.o. male  General Appearance:    Alert, cooperative, in no acute distress                    There were no vitals filed for this visit.     Vital signs reviewed.   General: alert, oriented  Eyes: conjunctiva clear  ENT: external ears and nose atraumatic  CV: no peripheral edema  Resp: normal respiratory effort  Skin: no rashes or wounds; normal turgor  Psych: mood and affect appropriate  Lymph: no nodes appreciated  Neuro: gross sensation intact  Vascular:  Palpable peripheral pulse in noted extremity  Musculoskeletal Extremities: Left Knee:     Range of motion is 0 to 130 degrees.  Flexion strength- 4+/5.  Extension strength- 4+/5.  Maximal tenderness to palpation medial joint line-significant tenderness to both medial tibial plateau and medial femoral condyle just around the joint surface  Diana's exam- Positive with pain along the medial joint line, no click.  Effusion- None.  Lachman's test- Grade 1A.  Anterior drawer- Negative.  Posterior drawer- Negative.  mild asymmetric laxity of the lateral collateral ligament on varus stress at 30 degrees, no significant opening at full extension.  Log roll test- Negative.  Stinchfield's test- Negative.    Debilities/Disabilities  Identified: None        Diagnostic Tests:  Pre-Admission Testing on 03/01/2024   Component Date Value Ref Range Status    PTT 03/01/2024 26.9  24.3 - 38.1 seconds Final    Protime 03/01/2024 12.9  12.1 - 15.0 Seconds Final    INR 03/01/2024 0.97  0.90 - 1.10 Final    Glucose 03/01/2024 130 (H)  65 - 99 mg/dL Final    BUN 03/01/2024 22  8 - 23 mg/dL Final    Creatinine 03/01/2024 1.19  0.76 - 1.27 mg/dL Final    Sodium 03/01/2024 134 (L)  136 - 145 mmol/L Final    Potassium 03/01/2024 3.8  3.5 - 5.2 mmol/L Final    Chloride 03/01/2024 100  98 - 107 mmol/L Final    CO2 03/01/2024 23.1  22.0 - 29.0 mmol/L Final    Calcium 03/01/2024 8.9  8.6 - 10.5 mg/dL Final    BUN/Creatinine Ratio 03/01/2024 18.5  7.0 - 25.0 Final    Anion Gap 03/01/2024 10.9  5.0 - 15.0 mmol/L Final    eGFR 03/01/2024 65.7  >60.0 mL/min/1.73 Final    Hemoglobin A1C 03/01/2024 5.30  4.80 - 5.60 % Final    QT Interval 03/01/2024 435  ms Final    QTC Interval 03/01/2024 454  ms Final    WBC 03/01/2024 6.25  3.40 - 10.80 10*3/mm3 Final    RBC 03/01/2024 4.93  4.14 - 5.80 10*6/mm3 Final    Hemoglobin 03/01/2024 14.7  13.0 - 17.7 g/dL Final    Hematocrit 03/01/2024 44.1  37.5 - 51.0 % Final    MCV 03/01/2024 89.5  79.0 - 97.0 fL Final    MCH 03/01/2024 29.8  26.6 - 33.0 pg Final    MCHC 03/01/2024 33.3  31.5 - 35.7 g/dL Final    RDW 03/01/2024 12.5  12.3 - 15.4 % Final    RDW-SD 03/01/2024 40.9  37.0 - 54.0 fl Final    MPV 03/01/2024 10.5  6.0 - 12.0 fL Final    Platelets 03/01/2024 224  140 - 450 10*3/mm3 Final    Neutrophil % 03/01/2024 59.7  42.7 - 76.0 % Final    Lymphocyte % 03/01/2024 27.8  19.6 - 45.3 % Final    Monocyte % 03/01/2024 9.3  5.0 - 12.0 % Final    Eosinophil % 03/01/2024 2.2  0.3 - 6.2 % Final    Basophil % 03/01/2024 0.8  0.0 - 1.5 % Final    Immature Grans % 03/01/2024 0.2  0.0 - 0.5 % Final    Neutrophils, Absolute 03/01/2024 3.73  1.70 - 7.00 10*3/mm3 Final    Lymphocytes, Absolute 03/01/2024 1.74  0.70 - 3.10 10*3/mm3 Final     Monocytes, Absolute 03/01/2024 0.58  0.10 - 0.90 10*3/mm3 Final    Eosinophils, Absolute 03/01/2024 0.14  0.00 - 0.40 10*3/mm3 Final    Basophils, Absolute 03/01/2024 0.05  0.00 - 0.20 10*3/mm3 Final    Immature Grans, Absolute 03/01/2024 0.01  0.00 - 0.05 10*3/mm3 Final     No results found.    MRI Results: Reviewed again MRI left knee from [facility] including review of imaging as well as radiology report indicates complex tear of the posterior horn and body of the medial meniscus with subchondral insufficiency stress fractures of both medial femoral condyle and medial tibial plateau. Moderate medial compartment degenerative change     Assessment:    Complex tear of medial meniscus of left knee as current injury    Insufficiency fracture of tibia    Closed subchondral insufficiency fracture of condyle of left femur           Plan:  We will plan on proceeding with surgery at patient's request for a knee arthroscopy, subchondroplasty for medial femoral condyle and medial tibial plateau insufficiency fractures, meniscal and cartilage surgery as indicated.  I reviewed details of procedure with patient today and discussed risks, benefits, alternatives, and limitations of the procedure in laymen's terms with the risks including but not limited to:  neurovascular damage, bleeding, infection, chronic pain, worsening of pain, chondrolysis, recurrent meniscal tear, swelling, loss of motion, weakness, stiffness, instability, DVT, pulmonary embolus, death, stroke, complex regional pain syndrome, and need for additional procedures.  No guarantees were given regarding results of surgery.     Patient was given the opportunity to ask and have all questions answered today.  The patient voiced understanding of the risks, benefits, and alternative forms of treatment that were discussed and the patient consents to proceed with surgery.       Discharge Plan: Home with f/u in 1 week in the office with Dr. Stewart  Elliot      Dictated utilizing Dragon dictation

## 2024-03-05 NOTE — OP NOTE
DATE OF OPERATION: 3/5/2024    PREOPERATIVE DIAGNOSIS:   1. Left knee medial meniscal tear.   2.  Left knee medial femoral condyle insufficiency fracture  3.  Left knee medial tibial plateau insufficiency fracture    POSTOPERATIVE DIAGNOSES:    1. Left knee medial, lateral meniscal tear.    2.  Left knee medial and patellofemoral chondromalacia  3.  Left knee medial femoral condyle insufficiency fracture  4.  Left knee medial tibial plateau insufficiency fracture     PROCEDURES PERFORMED:    1. Left knee arthroscopy with partial medial, lateral meniscectomy.    2.  Percutaneous treatment left knee medial femoral condyle insufficiency fracture  3.  Percutaneous treatment left knee medial tibial plateau insufficiency fracture    SURGEON: Terry Zarate MD    ASSISTANT: Assistant: Wilfredo Calvillo CSA was responsible for performing the following activities: Retraction, Irrigation, Closing, Placing Dressing, and Held/Positioned Camera and their skilled assistance was necessary for the success of this case.    ANESTHESIA: General endotracheal anesthesia with local    ESTIMATED BLOOD LOSS: minimal    URINE OUTPUT: Not recorded.     FLUIDS: Per Anesthesia.     COMPLICATIONS: None.     SPECIMENS: None.     DRAINS: None.     Implants: Marzena subchondroplasty cement 2 cc for medial femoral condyle insufficiency fracture, 1 cc for medial tibial plateau insufficiency fracture    Tourniquet Times:     Inflated: 3/5/2024 10:29 AM     Deflated: 3/5/2024 11:33 AM    EXAMINATION UNDER ANESTHESIA: Passive range of motion of the left knee 0° to 125°, mild effusion noted, grade 1A Lachman, negative anterior and posterior drawer, stable to varus and valgus stress 0° and 30°, midline patellar tracking, 1-quadrant medial and lateral patellar laxity.     ARTHROSCOPY FINDINGS:    1. Suprapatellar pouch- free of loose bodies.    2. Medial and lateral gutters- free of loose bodies.    3. Patellofemoral joint: Grade 3/4 chondral wear most  noted in the central patellar ridge as well as central trochlea.    4. Medial compartment: Grade 3/4 chondral wear of medial femoral condyle, grade 2/3 chondral wear medial tibial plateau, complex tear of posterior horn posterior body of the medial meniscus involving approximately 30% total meniscal volume, meniscal root intact.    5. Notch: ACL intact. PCL intact.    6. Lateral compartment: Complex inner rim tearing of the body of the lateral meniscus, minimal evidence of articular cartilage pathology.     INDICATIONS FOR PROCEDURE: The patient is a pleasant 68 y.o. male with significant history of pain in left knee. Given persistence of pain, failure of conservative treatment, as well as MRI findings consistent with the above-mentioned diagnosis the patient elected to proceed with the above-mentioned procedure. Patient had also failed conservative treatment. Patient was explained details of the procedure, as well as risks, benefits, and alternatives as documented on the history and physical, and had all questions answered prior to signing the operative consent form. No guarantees were given in regard to the results of the surgery.     DESCRIPTION OF PROCEDURE: The patient was seen, evaluated, and cleared for surgery by Anesthesia. Patient was met in the preoperative holding area. The operative site was marked, consent was reviewed, history and physical was updated, and preoperative labs were reviewed. The patient was then taken to the operating room and placed in a supine position on a regular OR table. After successful intubation per Anesthesia, an examination under anesthesia was carried out at this point in time. A nonsterile tourniquet was applied to the left lower extremity. The left leg was placed in a leg massey, and the contralateral leg placed in stirrups. The patient was secured to table with a waist strap. All bony prominences were well padded. The left lower extremity was then sterilely prepped and  draped in a standard fashion.     A formal timeout was completed, including confirmation of history and physical, operative consent, surgical site, patient identification number, and preoperative antibiotic administration. The left leg was then exsanguinated and the tourniquet inflated to 250 mmHg. The procedure was begun with establishing a standard anterolateral portal with a #11 blade followed by insertion of a blunt trocar and cannula. The scope was then inserted at this point in time. Anteromedial portal was then created with spinal needle using outside-in technique. A probe was then used to complete a diagnostic arthroscopic exam with findings as noted above.     Attention was then turned to the medial meniscal tear with a combination of a handheld biter, shaver, and ablation wand used to complete a partial medial meniscectomy. Peripheral rim was intact following the meniscectomy, as well as the posterior root and anterior horn. Total meniscal debridement of approximately 30% of the meniscal volume was completed at this time. A probe was reinserted. There was noted to be a smooth stable rim of meniscus with no residual tear noted.     Attention was then turned to the lateral meniscal tear with a combination of a handheld biter, shaver, and ablation wand used to complete a partial lateral meniscectomy. Peripheral rim was intact following the meniscectomy, as well as the posterior root and anterior horn. Total meniscal debridement of approximately 10% of the meniscal volume was completed at this time. A probe was reinserted. There was noted to be a smooth stable rim of meniscus with no residual tear noted.     Attention was then turned to treatment of subchondral insufficiency fracture.  MRI was available in the room and used to localize the site of the subchondral fracture.  C-arm fluoroscopy was then used to identify this intraoperatively.  Once the site was identified on both AP and lateral images a 1 cm  percutaneous incision was made both at the distal femur and proximal tibia with 15 blade through skin only. Subcutaneous dissection was carried down to the medial distal femoral and proximal tibial cortex with a hemostat.  A cannulated drill from the subchondroplasty set was then advanced into the medial femoral  cortex to the site of the subchondral fracture.  In similar fashion a second cannulated drill was passed into the medial tibial cortex at the site of the subchondral fracture as noted on MRI. Cement was mixed on the back table according to package instructions and brought to the operative field.  Inner trocar was removed and cement was advanced through the cannula into the femoral and tibial insufficiency fracture sites under C-arm visualization.  A full 2 cc was inserted at the distal femur and 1 cc was inserted at the proximal tibia and cement allowed to harden before the cannula was removed.  Scope was reinserted into the knee at this point time with no evidence of intra-articular penetration of the cement.  Final fluoroscopic images were then taken at this time    The knee was then thoroughly irrigated with normal saline using a metal cannula and fluid from the scope. The fluid was then evacuated from the knee with suction at this point in time, as well as direct pressure. All arthroscopic instruments were removed at this point in time. The wounds were then closed with 2-0 nylon in interrupted fashion. Injection of 8 mL of 1% lidocaine plain and 2 mg of morphine were then injected into the knee at this point. The wounds were then dressed with Xeroform gauze, 4 x 4's, Tegaderm, ABD pad, Webril, Ace wrap, ice pack.     At the end of the procedure all lap, needle, and sponge counts were correct x2. The patient had brisk capillary refill to all digits of the left lower extremity. Compartments were soft and easily compressible at the end of the procedure.     DISPOSITION: The patient was extubated per  Anesthesia and taken to the recovery room in stable condition. Will be discharged home in the care of family. Follow up in 1 week for a wound check. Weight bear as tolerated to the left lower extremity. Discharge with Percocet for pain control and Zofran for nausea. Discussed results of the procedure immediately postoperatively with the patient's family, and they had all questions answered at that time.

## 2024-03-05 NOTE — ANESTHESIA PROCEDURE NOTES
Airway  Urgency: elective    Date/Time: 3/5/2024 10:03 AM  Airway not difficult    General Information and Staff    Patient location during procedure: OR  CRNA/CAA: Aaron Garg CRNA    Indications and Patient Condition  Indications for airway management: airway protection    Preoxygenated: yes  Mask difficulty assessment: 0 - not attempted    Final Airway Details  Final airway type: supraglottic airway      Successful airway: unique  Size 4     Number of attempts at approach: 1  Assessment: lips, teeth, and gum same as pre-op

## 2024-03-05 NOTE — ANESTHESIA PROCEDURE NOTES
Peripheral Block      Patient reassessed immediately prior to procedure    Patient location during procedure: pre-op  Start time: 3/5/2024 8:50 AM  Stop time: 3/5/2024 8:57 AM  Reason for block: at surgeon's request, post-op pain management and secondary anesthetic  Performed by  CRNA/CAA: Belinda Zepeda CRNA  Preanesthetic Checklist  Completed: patient identified, IV checked, site marked, risks and benefits discussed, surgical consent, monitors and equipment checked, pre-op evaluation and timeout performed  Prep:  Pt Position: supine  Sterile barriers:alcohol skin prep, cap, gloves, mask and washed/disinfected hands  Prep: ChloraPrep  Patient monitoring: blood pressure monitoring, continuous pulse oximetry and EKG  Procedure    Sedation: yes  Performed under: MAC  Guidance:ultrasound guided    ULTRASOUND INTERPRETATION.  Using ultrasound guidance a 21 G gauge needle was placed in close proximity to the femoral nerve, at which point, under ultrasound guidance anesthetic was injected in the area of the nerve and spread of the anesthesia was seen on ultrasound in close proximity thereto.  There were no abnormalities seen on ultrasound; a digital image was taken; and the patient tolerated the procedure with no complications. Images:still images obtained, printed/placed on chart    Laterality:left  Block Type:adductor canal block  Injection Technique:single-shot  Needle Type:echogenic  Needle Gauge:21 G  Resistance on Injection: none    Medications Used: bupivacaine PF (MARCAINE) injection 0.25% - Injection   15 mL - 3/5/2024 8:57:00 AM      Post Assessment  Injection Assessment: negative aspiration for heme, incremental injection and no paresthesia on injection  Patient Tolerance:comfortable throughout block  Complications:no

## 2024-03-05 NOTE — ANESTHESIA POSTPROCEDURE EVALUATION
Patient: Nabor Muhammad Dr.    Procedure Summary       Date: 03/05/24 Room / Location:  [facility] OR 1 /  [facility]    Anesthesia Start: 0956 Anesthesia Stop: 1147    Procedure: LEFT KNEE ARTHROSCOPY WITH OPEN ARTHROTOMY SUBCHONDROPLASTY, partial medial and lateral meniscectomy (Left: Knee) Diagnosis:       Complex tear of medial meniscus of left knee as current injury, initial encounter      Insufficiency fracture of tibia, initial encounter      Closed subchondral insufficiency fracture of condyle of left femur      (Complex tear of medial meniscus of left knee as current injury, initial encounter [S83.232A])      (Insufficiency fracture of tibia, initial encounter [M84.469A])      (Closed subchondral insufficiency fracture of condyle of left femur [M84.452A])    Surgeons: Terry Zarate MD Provider: Aaron Garg CRNA    Anesthesia Type: general with block, regional ASA Status: 2            Anesthesia Type: general with block, regional    Vitals  Vitals Value Taken Time   /101 03/05/24 1225   Temp 97.9 °F (36.6 °C) 03/05/24 1144   Pulse 59 03/05/24 1228   Resp 15 03/05/24 1220   SpO2 89 % 03/05/24 1228   Vitals shown include unfiled device data.        Post Anesthesia Care and Evaluation    Patient location during evaluation: PHASE II  Patient participation: complete - patient participated  Level of consciousness: awake and alert  Pain score: 3  Pain management: satisfactory to patient    Airway patency: patent  Anesthetic complications: No anesthetic complications  PONV Status: none  Cardiovascular status: acceptable  Respiratory status: acceptable  Hydration status: acceptable

## 2024-03-08 ENCOUNTER — TREATMENT (OUTPATIENT)
Dept: PHYSICAL THERAPY | Facility: CLINIC | Age: 69
End: 2024-03-08
Payer: COMMERCIAL

## 2024-03-08 DIAGNOSIS — R26.2 DIFFICULTY WALKING DUE TO KNEE JOINT: ICD-10-CM

## 2024-03-08 DIAGNOSIS — Z98.890 S/P ARTHROSCOPIC SURGERY OF LEFT KNEE: Primary | ICD-10-CM

## 2024-03-08 DIAGNOSIS — M25.562 ACUTE POSTOPERATIVE PAIN OF LEFT KNEE: ICD-10-CM

## 2024-03-08 DIAGNOSIS — G89.18 ACUTE POSTOPERATIVE PAIN OF LEFT KNEE: ICD-10-CM

## 2024-03-08 DIAGNOSIS — M25.662 DECREASED ROM OF LEFT KNEE: ICD-10-CM

## 2024-03-08 NOTE — PROGRESS NOTES
Physical Therapy Initial Evaluation and Plan of Care    Patient: Nabor Muhammad Dr.   : 1955  Diagnosis/ICD-10 Code:  S/P arthroscopic surgery of left knee [Z98.890]  Referring practitioner: Terry Zarate MD  Date of Initial Visit: 3/8/2024  Today's Date: 3/08/2024  Patient seen for 1 session         Visit Diagnoses:    ICD-10-CM ICD-9-CM   1. S/P arthroscopic surgery of left knee  Z98.890 V45.89   2. Acute postoperative pain of left knee  G89.18 719.46    M25.562 338.18   3. Decreased ROM of left knee  M25.662 719.56   4. Difficulty walking due to knee joint  R26.2 719.7         Subjective Questionnaire: LEFS:       Subjective Evaluation    History of Present Illness  Date of surgery: 3/5/2024  Mechanism of injury: Pt is a 69 y/o WM who reports to the clinic S/P left knee scope on 3/5/2024 for left partial medial, lateral meniscectomy, Percutaneous treatment left knee medial femoral condyle insufficiency fracture, and Percutaneous treatment left knee medial tibial plateau insufficiency fracture.  Pt also has had a right knee scope 13 yrs ago from Dr. Statton.  Pt also injured his left knee when he was 14 yrs old-Fx it through the epiphysis-was in a cast and received no PT.  Pt states his patella sit lateral and he had bad chondromalacia.  Pt played 4 yrs rugby, three yrs of basketball, and snow skii.  Pt has had the Off  brace for one month and it makes a difference when it stays in place,  but it slips.  Pt has had two Cortisone injections-one by Dr. Gould and one by Dr. Zarate in mid Dec 2023.  Pt states the injection did not really help, so decided to do the surgery.        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[employer], likes to hunt Quality of life: good    Pain  Current pain ratin  At worst pain ratin  Location: right medial knee  joint  Quality: dull ache and sharp (constant tooth ache)  Relieving factors: medications, ice, support, rest and change in position (hydrocodone prior to PT)  Aggravating factors: prolonged positioning, ambulation and standing (hardly has any ROM)    Hand dominance: right    Diagnostic Tests  X-ray: abnormal  MRI studies: abnormal    Treatments  No previous or current treatments  Previous treatment: physical therapy and injection treatment (Prior PT for back and left knee)  Current treatment: medication  Patient Goals  Patient goals for therapy: decreased pain, increased motion, increased strength, return to sport/leisure activities, independence with ADLs/IADLs and return to work             Objective          Observations   Left Knee   Positive for effusion and incision.     Additional Knee Observation Details  Pt with moderate left proximal knee effusion by gross observation.  Pt's incision covered with post op dressing-pt with minimal dry bloody drainage through lateral bandage.  No signs of infection at this time.      Palpation   Left   Tenderness of the vastus lateralis and vastus medialis.     Additional Palpation Details  Pt with mild left medial and lateral knee with palpation     Tenderness   Left Knee   Tenderness in the lateral joint line and medial joint line.     Active Range of Motion   Left Knee   Flexion: 86 degrees   Extension: 2 degrees     Right Knee   Flexion: 128 degrees   Extension: 0 degrees     Patellar Mobility   Left Knee Patellar tendons within functional limits include the medial and lateral. Hypomobile in the left superior and left inferior patellar tendon(s).     Strength/Myotome Testing     Left Hip   Planes of Motion   Flexion: 3  Abduction: 3+  Adduction: 3+    Right Hip   Planes of Motion   Flexion: 5  Abduction: 5  Adduction: 5    Left Knee   Flexion: 5  Extension: 5  Quadriceps contraction: good    Right Knee   Flexion: 3+  Extension: 4  Quadriceps contraction: good    Tests      Left Hip   SLR: Positive.     Right Hip   SLR: Positive.     Additional Tests Details  Mild hamstring tightness with SLR B   NA due to S/P surgery     Swelling     Left Knee Girth Measurement (cm)   Joint line: 46.7.  10 cm above joint line: SP 54.5.  10 cm below joint line: IP 43.    Right Knee Girth Measurement (cm)   Joint line: 43 cm  10 cm above joint line: SP 50.  10 cm below joint line: IP 41.5.    Ambulation     Observational Gait   Gait: antalgic   Decreased walking speed, stride length, left stance time, left swing time and left step length.   Left foot contact pattern: foot flat    Additional Observational Gait Details  Pt enters department WBAT left LE with decreased knee flex moderate antalgic gait without an AD          Assessment & Plan       Assessment  Impairments: abnormal gait, abnormal or restricted ROM, activity intolerance, impaired balance, impaired physical strength, lacks appropriate home exercise program, pain with function and weight-bearing intolerance   Functional limitations: sleeping, walking, uncomfortable because of pain, standing, stooping and unable to perform repetitive tasks   Assessment details: Pt is a 69 y/o WM who reports to the clinic with left knee pain, decreased flexibility, decreased ROM, strength, tenderness, knee effusion, and decreased functional mobility with ambulation, standing, and steps.  Pt's personal factors of being a MD and needing to return to work in 2 weeks may affect progress in plan of care. Signs and symptoms are consistent with physical therapy diagnosis of S/P arthroscopic knee surgery. Patient is appropriate for skilled PT to address impairments and reduce pain in order to improve ease with daily mobility and ADLs.  Pt is educated on recovery of left knee, needing use RICE for healing and controlling the pain, using his crutches to unload the knee to decrease pain and swelling, using his brace once the swelling decreases and the brace has a  proper fit, course of treatment, and pt was given a HEP.          Prognosis: good    Goals  Plan Goals: STGs: 2-6 weeks  1. Decrease left  knee pain to a 5/10 with standing and ambulation.    2. Increase left knee AROM 0-115 degrees for improved ability to dress his left LE.    3.  Decrease left medial and lateral knee joint tenderness to minimal for improved ability to perform his ADLs and sleep through the night.     4.  Increase B hamstring flexibility to WNL with SLR test to increase ability to dress his LE.    5.  Pt ambulates into clinic without an AD minimal antalgic gait left LE getting good heel strike to toe off gait pattern.     6.  Decrease left knee effusion at girth measurements by 2-3 cm for improved ROM and functional mobility.      LTGs: 5-8 weeks  1.  Pt Independent with HEP for self management of symptoms.  2.  Increase left knee AROM 0-120 for improved ability to ambulate, go up/down steps, and perform his ADLS.    3.  Increase left knee and hip strength to 5/5 for improved stability with standing and ambulation.     4.  Decrease left knee pain to < or = 2/10 with standing and ambulation.    5.  Pt ambulates left LE without an antalgic gait getting good heel strike and toe off gait pattern.        Plan  Therapy options: will be seen for skilled therapy services  Planned modality interventions: cryotherapy, electrical stimulation/Russian stimulation, thermotherapy (hydrocollator packs), ultrasound and dry needling  Planned therapy interventions: joint mobilization, home exercise program, gait training, flexibility, strengthening, stretching, functional ROM exercises, manual therapy, neuromuscular re-education, soft tissue mobilization and therapeutic activities  Frequency: 3x week  Duration in weeks: 8  Treatment plan discussed with: patient        Timed:         Manual Therapy:         mins  17189;     Therapeutic Exercise:   20      mins  84983;     Neuromuscular Tyrone:        mins  81765;     Therapeutic Activity:          mins  79924;     Gait Training:           mins  49860;     Ultrasound:          mins  50223;    Ionto                                   mins   36331  Self Care                            mins   15261  Canalith Repos         mins 61369      Un-Timed:  Electrical Stimulation:    15     mins  14584 ( );  Dry Needling          mins self-pay  Traction          mins 35871  Low Eval     20     Mins  73708  Mod Eval          Mins  96531  High Eval                            Mins  24038      Timed Treatment:  20    mins   Total Treatment:     59   mins        PT: Graciela Trejo PT     License Number: [id number]  Electronically signed by Graciela Trejo PT, 03/08/24, 12:55 PM EST    Certification Period: 3/8/2024 thru 6/5/2024  I certify that the therapy services are furnished while this patient is under my care.  The services outlined above are required by this patient, and will be reviewed every 90 days.         Physician Signature:__________________________________________________    PHYSICIAN: Terry Zarate MD  NPI: [id number]                                      DATE:      Please sign and return via fax to [email or web address] . Thank you, [facility].

## 2024-03-11 ENCOUNTER — TREATMENT (OUTPATIENT)
Dept: PHYSICAL THERAPY | Facility: CLINIC | Age: 69
End: 2024-03-11
Payer: COMMERCIAL

## 2024-03-11 DIAGNOSIS — Z98.890 S/P ARTHROSCOPIC SURGERY OF LEFT KNEE: Primary | ICD-10-CM

## 2024-03-11 DIAGNOSIS — M25.662 DECREASED ROM OF LEFT KNEE: ICD-10-CM

## 2024-03-11 DIAGNOSIS — R26.2 DIFFICULTY WALKING DUE TO KNEE JOINT: ICD-10-CM

## 2024-03-11 DIAGNOSIS — G89.18 ACUTE POSTOPERATIVE PAIN OF LEFT KNEE: ICD-10-CM

## 2024-03-11 DIAGNOSIS — M25.562 ACUTE POSTOPERATIVE PAIN OF LEFT KNEE: ICD-10-CM

## 2024-03-11 NOTE — PROGRESS NOTES
Physical Therapy Daily Treatment Note    Patient: Nabor Muhammad Dr.   : 1955  Referring practitioner: Terry Zarate MD  Date of Initial Visit: Type: THERAPY  Noted: 3/8/2024  Today's Date: 3/11/2024  Patient seen for 2 sessions       Visit Diagnoses:    ICD-10-CM ICD-9-CM   1. S/P arthroscopic surgery of left knee  Z98.890 V45.89   2. Acute postoperative pain of left knee  G89.18 719.46    M25.562 338.18   3. Decreased ROM of left knee  M25.662 719.56   4. Difficulty walking due to knee joint  R26.2 719.7       Nabor Muhammad Dr. reports: his left knee pain is a 2/10-9/10.  Took Hydrocodone with a couple of advil prior to coming to PT.  Pt states his pain was terrible last night.      Subjective       Objective          Active Range of Motion     Additional Active Range of Motion Details  Left knee AAROM flex 98 degrees       See Exercise, Manual, and Modality Logs for complete treatment.     Pt enters department WBAT left LE mild antalgic gait without AD.        Assessment & Plan       Assessment  Assessment details: Pt is tolerating treatment well, but continues to have moderate left knee pain especially at night and with standing/ambulation.  Pt is trying to limit his activity to decrease his pain and swelling.  Pt iced and elevated his left LE over the weekend.  Pt with improved left knee flex AROM by 10 degrees with an empty end feel.  Added Nustep and sitting AAROM knee flex stretch today to increase pt's ROM.  Pt completed all exercises with good quad control, but reports having a constant knee pain through out treatment.  Ended treatment with IFC and ice to decrease knee effusion and pain.  Will continue to see pt 3x/week for improved ROM, strength, gait, decreased knee effusion and pain.          Progress per Plan of Care      Timed:         Manual Therapy:         mins  77982;     Therapeutic Exercise:    36     mins  97450;     Neuromuscular Tyrone:        mins  35385;    Therapeutic  Activity:          mins  52248;     Gait Training:           mins  26842;     Ultrasound:          mins  60985;    Ionto                                   mins   00246  Self Care                            mins   99204  Canalith Repos         mins 97234      Un-Timed:  Electrical Stimulation:   15      mins  38782 ( );  Dry Needling          mins self-pay  Traction          mins 56117      Timed Treatment:  36    mins   Total Treatment:    60    mins    Graciela Trejo, PT  [place] License: [id number]

## 2024-03-13 ENCOUNTER — OFFICE VISIT (OUTPATIENT)
Dept: ORTHOPEDIC SURGERY | Facility: CLINIC | Age: 69
End: 2024-03-13
Payer: COMMERCIAL

## 2024-03-13 ENCOUNTER — TREATMENT (OUTPATIENT)
Dept: PHYSICAL THERAPY | Facility: CLINIC | Age: 69
End: 2024-03-13
Payer: COMMERCIAL

## 2024-03-13 VITALS — WEIGHT: 271 LBS | HEIGHT: 72 IN | BODY MASS INDEX: 36.7 KG/M2

## 2024-03-13 DIAGNOSIS — M25.662 DECREASED ROM OF LEFT KNEE: ICD-10-CM

## 2024-03-13 DIAGNOSIS — R26.2 DIFFICULTY WALKING DUE TO KNEE JOINT: ICD-10-CM

## 2024-03-13 DIAGNOSIS — M84.452A: ICD-10-CM

## 2024-03-13 DIAGNOSIS — S83.232A COMPLEX TEAR OF MEDIAL MENISCUS OF LEFT KNEE AS CURRENT INJURY, INITIAL ENCOUNTER: ICD-10-CM

## 2024-03-13 DIAGNOSIS — Z98.890 STATUS POST ARTHROSCOPIC KNEE SURGERY: Primary | ICD-10-CM

## 2024-03-13 DIAGNOSIS — M25.562 ACUTE POSTOPERATIVE PAIN OF LEFT KNEE: ICD-10-CM

## 2024-03-13 DIAGNOSIS — M84.469A INSUFFICIENCY FRACTURE OF TIBIA, INITIAL ENCOUNTER: ICD-10-CM

## 2024-03-13 DIAGNOSIS — G89.18 ACUTE POSTOPERATIVE PAIN OF LEFT KNEE: ICD-10-CM

## 2024-03-13 DIAGNOSIS — Z98.890 S/P ARTHROSCOPIC SURGERY OF LEFT KNEE: Primary | ICD-10-CM

## 2024-03-13 RX ORDER — METHYLPREDNISOLONE 4 MG/1
TABLET ORAL
Qty: 21 TABLET | Refills: 0 | Status: SHIPPED | OUTPATIENT
Start: 2024-03-13

## 2024-03-13 RX ORDER — OXYCODONE HYDROCHLORIDE AND ACETAMINOPHEN 5; 325 MG/1; MG/1
1-2 TABLET ORAL EVERY 4 HOURS PRN
Qty: 42 TABLET | Refills: 0 | Status: SHIPPED | OUTPATIENT
Start: 2024-03-13

## 2024-03-13 NOTE — PROGRESS NOTES
Physical Therapy Daily Treatment Note    Patient: Nabor Muhammad Dr.   : 1955  Referring practitioner: Terry Zarate MD  Date of Initial Visit: Type: THERAPY  Noted: 3/8/2024  Today's Date: 3/13/2024  Patient seen for 3 sessions       Visit Diagnoses:    ICD-10-CM ICD-9-CM   1. S/P arthroscopic surgery of left knee  Z98.890 V45.89   2. Acute postoperative pain of left knee  G89.18 719.46    M25.562 338.18   3. Decreased ROM of left knee  M25.662 719.56   4. Difficulty walking due to knee joint  R26.2 719.7       Nabor Muhammad Dr. reports: he is doing a little better today.  Saw JASSI Arcos this am.  She is starting him on a steroid pack to help with the swelling.      Subjective       Objective          Active Range of Motion   Left Knee   Flexion: 102 degrees     Patellar Mobility   Left Knee Patellar tendons within functional limits include the medial and lateral. Hypomobile in the left superior and left inferior patellar tendon(s).     Ambulation     Observational Gait   Gait: circumduction   Decreased walking speed, stride length, left stance time, left swing time and left step length.   Left foot contact pattern: foot flat    Additional Observational Gait Details  Pt ambulates WBAT without AD left LE getting decreased knee flex foot flat position       See Exercise, Manual, and Modality Logs for complete treatment.     Assessment & Plan       Assessment  Assessment details: Pt is tolerating treatment well and reports his pain is improving.  Pt's left knee flexion AROM is improving, but still has moderate pain at end ranges of flexion.  Added knee flex vs TB and TKE.  Pt completed all exercises with minimal difficulty.  Attempted sidelying hip add, but had medial knee pain so modified exercise to adductor ball squeeze.   Ended treatment with IFC and ice to decrease knee effusion and pain.  Will continue to see pt 3x/week for improved ROM, strength, gait, decreased knee effusion and pain.           Progress per Plan of Care      Timed:         Manual Therapy:    3     mins  45603;     Therapeutic Exercise:   22      mins  22875;     Neuromuscular Tyrone:        mins  56538;    Therapeutic Activity:    10      mins  08958;     Gait Training:           mins  30617;     Ultrasound:          mins  59323;    Ionto                                   mins   75942  Self Care                            mins   53462  Canalith Repos         mins 26130      Un-Timed:  Electrical Stimulation:   15      mins  35461 ( );  Dry Needling          mins self-pay  Traction          mins 23500      Timed Treatment:  35    mins   Total Treatment:     58   mins    Graciela Trejo, PT  [place] License: [id number]

## 2024-03-13 NOTE — TELEPHONE ENCOUNTER
Rx Refill Note  Requested Prescriptions     Pending Prescriptions Disp Refills    oxyCODONE-acetaminophen (PERCOCET) 5-325 MG per tablet 42 tablet 0     Sig: Take 1-2 tablets by mouth Every 4 (Four) Hours As Needed for Pain.      Last office visit with prescribing clinician: Today with Kya KEENE      Next office visit with prescribing clinician: 4/10/2024   Last Filled: 3/5/2024    Encounter Diagnoses   Name Primary?    Complex tear of medial meniscus of left knee as current injury, initial encounter     Insufficiency fracture of tibia, initial encounter     Closed subchondral insufficiency fracture of condyle of left femur       Date of Surgery: 3/5/2024      Gabriella Burns MA  03/13/24, 09:58 EDT    Previous RX pended for your approval, change or denial.     {TIP  Encounters:    {TIP  Please add Last Relevant Lab Date if appropriate:  {TIP  Is Refill Pharmacy correct?:

## 2024-03-14 NOTE — PROGRESS NOTES
CC: Follow-up status post left knee arthroscopy with partial medial, lateral meniscectomy, percutaneous treatment left knee medial femoral condyle insufficiency fracture, percutaneous treatment left knee medial tibial plateau insufficiency fracture, DOS 3/5/2024     Interval history: Patient returns to clinic today stating knee is doing well, no fevers, chills or sweats. No drainage from dressing noted. Weight-bearing as tolerated on left lower extremity . Denies numbness or tingling to left leg. No fevers chills or sweats.     Exam: Left knee-portal sites clean dry and intact, sutures intact. Knee range of motion 0-102°. moderate-severe swelling. Positive sensation light touch all distributions left foot symmetric to the contralateral side, brisk cap refill all digits, 2+ dorsalis pedis pulse left foot. No calf pain, negative Jerson's sign bilateral lower extremities.      Impression: Status post left knee arthroscopy with partial medial, lateral meniscectomy, percutaneous treatment left knee medial femoral condyle insufficiency fracture, percutaneous treatment left knee medial tibial plateau insufficiency fracture     Plan:  1. Weight-bear as tolerated. Avoid impact loading activities including jogging and running until follow up.  Continue with physical therapy range of motion.  We will start oral steroid taper to assist with reduction of swelling and decreased pain.  2. Sutures removed today, steri strips placed.  3. Follow-up in 3 weeks for re-evaluation. Continue to work on ROM and strengthening. Ice for swelling. All questions answered.    New Medications Ordered This Visit   Medications    methylPREDNISolone (MEDROL) 4 MG dose pack     Sig: Use as directed by package instructions     Dispense:  21 tablet     Refill:  0

## 2024-03-15 ENCOUNTER — TREATMENT (OUTPATIENT)
Dept: PHYSICAL THERAPY | Facility: CLINIC | Age: 69
End: 2024-03-15
Payer: COMMERCIAL

## 2024-03-15 DIAGNOSIS — G89.18 ACUTE POSTOPERATIVE PAIN OF LEFT KNEE: ICD-10-CM

## 2024-03-15 DIAGNOSIS — M25.562 ACUTE POSTOPERATIVE PAIN OF LEFT KNEE: ICD-10-CM

## 2024-03-15 DIAGNOSIS — R26.2 DIFFICULTY WALKING DUE TO KNEE JOINT: ICD-10-CM

## 2024-03-15 DIAGNOSIS — M25.662 DECREASED ROM OF LEFT KNEE: ICD-10-CM

## 2024-03-15 DIAGNOSIS — Z98.890 S/P ARTHROSCOPIC SURGERY OF LEFT KNEE: Primary | ICD-10-CM

## 2024-03-15 NOTE — PROGRESS NOTES
Physical Therapy Daily Treatment Note    Patient: Nabor Muhammad Dr.   : 1955  Referring practitioner: Terry Zarate MD  Date of Initial Visit: Type: THERAPY  Noted: 3/8/2024  Today's Date: 3/15/2024  Patient seen for 4 sessions       Visit Diagnoses:    ICD-10-CM ICD-9-CM   1. S/P arthroscopic surgery of left knee  Z98.890 V45.89   2. Acute postoperative pain of left knee  G89.18 719.46    M25.562 338.18   3. Decreased ROM of left knee  M25.662 719.56   4. Difficulty walking due to knee joint  R26.2 719.7       Nabor Muhammad Dr. reports: his knee pain is about the same.  It aches and he tries to get in a comfortable spot and has to readjust multiple times.      Subjective       Objective          Active Range of Motion   Left Knee   Flexion: 102 degrees     Patellar Mobility   Left Knee Patellar tendons within functional limits include the medial, lateral, superior and inferior.     Swelling     Left Knee Girth Measurement (cm)   Joint line: 42.7.  10 cm above joint line: SP 58.9.  10 cm below joint line: IP 41.2.      See Exercise, Manual, and Modality Logs for complete treatment.       Assessment & Plan       Assessment  Assessment details: Pt is tolerating treatment, but continues to c/o left knee pain limiting his tolerance to standing and ambulating.  Pt with decreased knee effusion at joint line and IP, but has increased effusion at the SP measurement.  Pt still has good left knee AROM with pain at end ranges of motion in flexion.  Pt with a empty end feel.  Pt performed all stretches and strengthening exercises with minimal discomfort.  Ended treatment with IFC and ice to decrease knee effusion and pain.  Will continue to see pt 3x/week for improved ROM, strength, gait, decreased knee effusion and pain.          Progress per Plan of Care      Timed:         Manual Therapy:    3     mins  16729;     Therapeutic Exercise:   21      mins  00987;     Neuromuscular Tyrone:        mins  46909;     Therapeutic Activity:    12      mins  14845;     Gait Training:          mins  31214;     Ultrasound:          mins  28197;    Ionto                                   mins   12133  Self Care                            mins   92933  Canalith Repos         mins 57552      Un-Timed:  Electrical Stimulation:    15     mins  18341 ( );  Dry Needling          mins self-pay  Traction          mins 96551      Timed Treatment:   33   mins   Total Treatment:     59   mins    Graciela Trejo, PT  [place] License: [id number]

## 2024-03-18 ENCOUNTER — TREATMENT (OUTPATIENT)
Dept: PHYSICAL THERAPY | Facility: CLINIC | Age: 69
End: 2024-03-18
Payer: COMMERCIAL

## 2024-03-18 DIAGNOSIS — M25.662 DECREASED ROM OF LEFT KNEE: ICD-10-CM

## 2024-03-18 DIAGNOSIS — R26.2 DIFFICULTY WALKING DUE TO KNEE JOINT: ICD-10-CM

## 2024-03-18 DIAGNOSIS — G89.18 ACUTE POSTOPERATIVE PAIN OF LEFT KNEE: ICD-10-CM

## 2024-03-18 DIAGNOSIS — Z98.890 S/P ARTHROSCOPIC SURGERY OF LEFT KNEE: Primary | ICD-10-CM

## 2024-03-18 DIAGNOSIS — M25.562 ACUTE POSTOPERATIVE PAIN OF LEFT KNEE: ICD-10-CM

## 2024-03-18 NOTE — PROGRESS NOTES
Physical Therapy Daily Treatment Note      Patient: Nabor Muhammad Dr.   : 1955  Referring practitioner: Terry Zarate MD  Date of Initial Visit: Type: THERAPY  Noted: 3/8/2024  Today's Date: 3/18/2024  Patient seen for 5 sessions       Visit Diagnoses:    ICD-10-CM ICD-9-CM   1. S/P arthroscopic surgery of left knee  Z98.890 V45.89   2. Acute postoperative pain of left knee  G89.18 719.46    M25.562 338.18   3. Decreased ROM of left knee  M25.662 719.56   4. Difficulty walking due to knee joint  R26.2 719.7       Nabor Muhammad Dr. reports: his knee is still hurting, but he was up on it a lot this weekend.  He did use the chainsaw a little, but did not  the wood.      Subjective       Objective          Active Range of Motion   Left Knee   Flexion: 110 degrees       See Exercise, Manual, and Modality Logs for complete treatment.       Assessment & Plan       Assessment  Assessment details: Pt presents to the clinic with improving knee ROM, decreasing knee effusion, and increasing tolerance to all exercises.   Pt performed all stretches and strengthening exercises with minimal discomfort.  Added forward and lateral step ups for improved quad strength.  Ended treatment with IFC and ice to decrease knee effusion and pain.  Will continue to see pt 3x/week for improved ROM, strength, gait, decreased knee effusion and pain.          Progress per Plan of Care      Timed:         Manual Therapy:         mins  34711;     Therapeutic Exercise:    25     mins  30180;     Neuromuscular Tyrone:        mins  44129;    Therapeutic Activity:     15     mins  10342;     Gait Training:           mins  28280;     Ultrasound:          mins  88832;    Ionto                                   mins   11362  Self Care                            mins   33950  Canalith Repos         mins 75936      Un-Timed:  Electrical Stimulation:   15      mins  16050 ( );  Dry Needling          mins self-pay  Traction           mins 40256      Timed Treatment:   40   mins   Total Treatment:    65    mins    Graciela Trejo, PT  [place] License: [id number]

## 2024-03-20 ENCOUNTER — TREATMENT (OUTPATIENT)
Dept: PHYSICAL THERAPY | Facility: CLINIC | Age: 69
End: 2024-03-20
Payer: COMMERCIAL

## 2024-03-20 DIAGNOSIS — R26.2 DIFFICULTY WALKING DUE TO KNEE JOINT: ICD-10-CM

## 2024-03-20 DIAGNOSIS — M25.562 ACUTE POSTOPERATIVE PAIN OF LEFT KNEE: ICD-10-CM

## 2024-03-20 DIAGNOSIS — M25.662 DECREASED ROM OF LEFT KNEE: ICD-10-CM

## 2024-03-20 DIAGNOSIS — G89.18 ACUTE POSTOPERATIVE PAIN OF LEFT KNEE: ICD-10-CM

## 2024-03-20 DIAGNOSIS — Z98.890 S/P ARTHROSCOPIC SURGERY OF LEFT KNEE: Primary | ICD-10-CM

## 2024-03-20 NOTE — PROGRESS NOTES
Physical Therapy Daily Treatment Note    Patient: Nabor Muhammad Dr.   : 1955  Referring practitioner: Terry Zarate MD  Date of Initial Visit: Type: THERAPY  Noted: 3/8/2024  Today's Date: 3/20/2024  Patient seen for 6 sessions       Visit Diagnoses:    ICD-10-CM ICD-9-CM   1. S/P arthroscopic surgery of left knee  Z98.890 V45.89   2. Acute postoperative pain of left knee  G89.18 719.46    M25.562 338.18   3. Decreased ROM of left knee  M25.662 719.56   4. Difficulty walking due to knee joint  R26.2 719.7       Nabor Muhammad Dr. reports: he is trying to get off the pain meds, so did not take any until last night.  Pt states he still has not taken any pain meds this am.  Pt states his knee hurts, but trying to do without the pain meds.  Pt is not going back to work next week.      Subjective       Objective          Active Range of Motion   Left Knee   Flexion: 108 degrees     Swelling     Left Knee Girth Measurement (cm)   Joint line: 42.6.  10 cm above joint line: SP 48.5.  10 cm below joint line: IP 40.9.      See Exercise, Manual, and Modality Logs for complete treatment.       Assessment & Plan       Assessment  Assessment details: Pt presents to the clinic with decreasing knee effusion and increasing tolerance to all exercises.  Pt with decreased knee AROM today, but pt is trying to wean off his pain meds.  Pt with a 10.4 cm decrease in left knee effusion at the SP measurement.  Pt performed all stretches and strengthening exercises with minimal discomfort.  Added right SLB and weights to OKC strengthening exercises to improve pt's proprioception strength for ambulating on uneven surfaces. Ended treatment with IFC and ice to decrease knee effusion and pain.  Will continue to see pt 3x/week for improved ROM, strength, gait, decreased knee effusion and pain.          Progress per Plan of Care      Timed:         Manual Therapy:         mins  44696;     Therapeutic Exercise:   17      mins   95555;     Neuromuscular Tyrone:        mins  54633;    Therapeutic Activity:     15     mins  79747;     Gait Training:           mins  41764;     Ultrasound:          mins  80673;    Ionto                                   mins   39347  Self Care                            mins   39301  Canalith Repos         mins 64138      Un-Timed:  Electrical Stimulation:   15      mins  60165 ( );  Dry Needling          mins self-pay  Traction          mins 25254      Timed Treatment:  32    mins   Total Treatment:    57    mins    Graciela Trejo, PT  [place] License: [id number]

## 2024-03-22 ENCOUNTER — TREATMENT (OUTPATIENT)
Dept: PHYSICAL THERAPY | Facility: CLINIC | Age: 69
End: 2024-03-22
Payer: COMMERCIAL

## 2024-03-22 DIAGNOSIS — Z98.890 S/P ARTHROSCOPIC SURGERY OF LEFT KNEE: Primary | ICD-10-CM

## 2024-03-22 DIAGNOSIS — R26.2 DIFFICULTY WALKING DUE TO KNEE JOINT: ICD-10-CM

## 2024-03-22 DIAGNOSIS — M25.562 ACUTE POSTOPERATIVE PAIN OF LEFT KNEE: ICD-10-CM

## 2024-03-22 DIAGNOSIS — M25.662 DECREASED ROM OF LEFT KNEE: ICD-10-CM

## 2024-03-22 DIAGNOSIS — G89.18 ACUTE POSTOPERATIVE PAIN OF LEFT KNEE: ICD-10-CM

## 2024-03-22 NOTE — PROGRESS NOTES
Physical Therapy Daily Treatment Note    Patient: Nabor Muhammad Dr.   : 1955  Referring practitioner: Terry Zarate MD  Date of Initial Visit: Type: THERAPY  Noted: 3/8/2024  Today's Date: 3/22/2024  Patient seen for 7 sessions       Visit Diagnoses:    ICD-10-CM ICD-9-CM   1. S/P arthroscopic surgery of left knee  Z98.890 V45.89   2. Acute postoperative pain of left knee  G89.18 719.46    M25.562 338.18   3. Decreased ROM of left knee  M25.662 719.56   4. Difficulty walking due to knee joint  R26.2 719.7       Nabor Muhammad Dr. reports: he is doing better, but the pain continues and rates it 3-4/10 described as a tooth ache.  Pt has to take 2 Percocets to sleep through the night.      Subjective       Objective          Active Range of Motion   Left Knee   Flexion: 112 degrees       See Exercise, Manual, and Modality Logs for complete treatment.       Assessment & Plan       Assessment  Assessment details: Pt is tolerating treatment well, but continues to c/o left knee pain.  Pt with improving knee AROM and increasing tolerance to all strengthening exercises.  Pt ambulates into the clinic with a minimal antalgic gait.  Added B leg press today for improved quad strength. Ended treatment with IFC and ice to decrease knee effusion and pain.  Will continue to see pt 3x/week for improved ROM, strength, gait, decreased knee effusion and pain.          Progress per Plan of Care      Timed:         Manual Therapy:         mins  33846;     Therapeutic Exercise:   18      mins  53576;     Neuromuscular Tyrone:        mins  48824;    Therapeutic Activity:    12      mins  04221;     Gait Training:           mins  11358;     Ultrasound:          mins  52841;    Ionto                                   mins   36417  Self Care                            mins   99022  Canalith Repos         mins 85852      Un-Timed:  Electrical Stimulation:    15     mins  15306 ( );  Dry Needling          mins  self-pay  Traction          mins 67182      Timed Treatment:  30    mins   Total Treatment:     55   mins    Graciela Trejo, PT  [place] License: [id number]

## 2024-03-26 ENCOUNTER — TREATMENT (OUTPATIENT)
Dept: PHYSICAL THERAPY | Facility: CLINIC | Age: 69
End: 2024-03-26
Payer: COMMERCIAL

## 2024-03-26 DIAGNOSIS — G89.18 ACUTE POSTOPERATIVE PAIN OF LEFT KNEE: ICD-10-CM

## 2024-03-26 DIAGNOSIS — Z98.890 S/P ARTHROSCOPIC SURGERY OF LEFT KNEE: Primary | ICD-10-CM

## 2024-03-26 DIAGNOSIS — M25.562 ACUTE POSTOPERATIVE PAIN OF LEFT KNEE: ICD-10-CM

## 2024-03-26 DIAGNOSIS — M25.662 DECREASED ROM OF LEFT KNEE: ICD-10-CM

## 2024-03-26 DIAGNOSIS — R26.2 DIFFICULTY WALKING DUE TO KNEE JOINT: ICD-10-CM

## 2024-03-26 NOTE — PROGRESS NOTES
Physical Therapy Daily Treatment Note    Patient: Nabor Muhammad Dr.   : 1955  Referring practitioner: Terry Zarate MD  Date of Initial Visit: Type: THERAPY  Noted: 3/8/2024  Today's Date: 3/26/2024  Patient seen for 8 sessions       Visit Diagnoses:    ICD-10-CM ICD-9-CM   1. S/P arthroscopic surgery of left knee  Z98.890 V45.89   2. Acute postoperative pain of left knee  G89.18 719.46    M25.562 338.18   3. Decreased ROM of left knee  M25.662 719.56   4. Difficulty walking due to knee joint  R26.2 719.7       Nabor Muhammad Dr. 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  Compensated with the right, so his right knee is pretty sore.  Pt iced his left knee for several hours and sat down.  He was up and down on his tractor several times.  He is pretty sore today.      Subjective       Objective          Active Range of Motion   Left Knee   Flexion: 111 degrees     Ambulation     Observational Gait   Gait: antalgic   Decreased walking speed, stride length, left stance time and left step length.   Left foot contact pattern: foot flat    Additional Observational Gait Details  Pt ambulates into the clinic today with a mild antalgic gait left LE with decreased knee flexion and foot flat gait pattern.        See Exercise, Manual, and Modality Logs for complete treatment.       Assessment & Plan       Assessment  Assessment details: Pt is tolerating treatment well, but continues to c/o left knee pain and knee effusion.  Pt with increased symptoms today from his increased activity level yesterday.  Pt with a slight decrease in ROM today.  Did a few reps of passive ROM in flexion with an empty end feel.  Pt performed OKC exercises due to pt's increase in discomfort today.  Continued with leg press and added single leg press today.  Ended treatment with IFC and ice to decrease knee effusion and pain.  Will continue to see pt 2x/week this week for improved ROM, strength, gait, decreased knee effusion  and pain.          Progress per Plan of Care      Timed:         Manual Therapy:    3     mins  84293;     Therapeutic Exercise:   22      mins  19632;     Neuromuscular Tyrone:        mins  10012;    Therapeutic Activity:   10       mins  35622;     Gait Training:           mins  72566;     Ultrasound:          mins  98840;    Ionto                                   mins   37133  Self Care                            mins   03431  Canalith Repos        mins 16744      Un-Timed:  Electrical Stimulation:    15     mins  63351 ( );  Dry Needling          mins self-pay  Traction          mins 96693      Timed Treatment:  35    mins   Total Treatment:     60   mins    Graciela Trejo, PT  [place] License: [id number]

## 2024-03-28 ENCOUNTER — TREATMENT (OUTPATIENT)
Dept: PHYSICAL THERAPY | Facility: CLINIC | Age: 69
End: 2024-03-28
Payer: COMMERCIAL

## 2024-03-28 DIAGNOSIS — Z98.890 S/P ARTHROSCOPIC SURGERY OF LEFT KNEE: Primary | ICD-10-CM

## 2024-03-28 DIAGNOSIS — M25.662 DECREASED ROM OF LEFT KNEE: ICD-10-CM

## 2024-03-28 DIAGNOSIS — R26.2 DIFFICULTY WALKING DUE TO KNEE JOINT: ICD-10-CM

## 2024-03-28 DIAGNOSIS — G89.18 ACUTE POSTOPERATIVE PAIN OF LEFT KNEE: ICD-10-CM

## 2024-03-28 DIAGNOSIS — M25.562 ACUTE POSTOPERATIVE PAIN OF LEFT KNEE: ICD-10-CM

## 2024-03-28 NOTE — PROGRESS NOTES
Physical Therapy Daily Treatment Note      Patient: Nabor Muhammad Dr.   : 1955  Referring practitioner: Terry Zarate MD  Date of Initial Visit: Type: THERAPY  Noted: 3/8/2024  Today's Date: 3/28/2024  Patient seen for 9 sessions       Visit Diagnoses:    ICD-10-CM ICD-9-CM   1. S/P arthroscopic surgery of left knee  Z98.890 V45.89   2. Acute postoperative pain of left knee  G89.18 719.46    M25.562 338.18   3. Difficulty walking due to knee joint  R26.2 719.7   4. Decreased ROM of left knee  M25.662 719.56       Nabor Muhammad Dr. reports: his knee is doing better.  Took two Percocets last night to help with the pain to sleep.  He woke up feeling pretty good, but felt dizzy.      Subjective       Objective          Active Range of Motion   Left Knee   Flexion: 116 degrees     Patellar Mobility   Left Knee Patellar tendons within functional limits include the medial, lateral, superior and inferior.     Additional Patellar Mobility Details  Pt with some pain during superior and inferior patellar mobs     Ambulation     Observational Gait   Gait: antalgic   Walking speed, stride length, left stance time, left swing time and left step length within functional limits.   Left foot contact pattern: heel to toe    Additional Observational Gait Details  Pt ambulates within clinic without brace minimal antalgic gait LLE with good heel strike to toe off gait pattern       See Exercise, Manual, and Modality Logs for complete treatment.     Pt enters department wearing his left knee off  brace.        Assessment & Plan       Assessment  Assessment details: Pt is tolerating treatment well and reports to the clinic with less pain, improved tolerance to strengthening exercises and less knee effusion.  Pt with improved knee flexion AAROM after a few reps of passive knee flexion stretches.  Added CKC strengthening and balance exercises back into his exercise program.  Added step downs for improved quad control  when descending steps.  Pt with some increased c/o left knee pain after exercises, but ended treatment with IFC and ice to decrease knee effusion and pain.  Will continue to see pt 2x/week this week for improved ROM, strength, gait, decreased knee effusion and pain.          Progress per Plan of Care      Timed:         Manual Therapy:   3      mins  47043;     Therapeutic Exercise:   22      mins  03913;     Neuromuscular Tyrone:        mins  50489;    Therapeutic Activity:    10      mins  89298;     Gait Training:           mins  72229;     Ultrasound:          mins  86023;    Ionto                                   mins   98432  Self Care                            mins   33285  Canalith Repos         mins 08942      Un-Timed:  Electrical Stimulation:   15      mins  55087 ( );  Dry Needling          mins self-pay  Traction          mins 62434      Timed Treatment:  35    mins   Total Treatment:    60    mins    Graciela Trejo, PT  [place] License: [id number]

## 2024-04-02 ENCOUNTER — TREATMENT (OUTPATIENT)
Dept: PHYSICAL THERAPY | Facility: CLINIC | Age: 69
End: 2024-04-02
Payer: COMMERCIAL

## 2024-04-02 DIAGNOSIS — Z98.890 S/P ARTHROSCOPIC SURGERY OF LEFT KNEE: Primary | ICD-10-CM

## 2024-04-02 DIAGNOSIS — R26.2 DIFFICULTY WALKING DUE TO KNEE JOINT: ICD-10-CM

## 2024-04-02 DIAGNOSIS — M25.662 DECREASED ROM OF LEFT KNEE: ICD-10-CM

## 2024-04-02 DIAGNOSIS — G89.18 ACUTE POSTOPERATIVE PAIN OF LEFT KNEE: ICD-10-CM

## 2024-04-02 DIAGNOSIS — M25.562 ACUTE POSTOPERATIVE PAIN OF LEFT KNEE: ICD-10-CM

## 2024-04-02 NOTE — PROGRESS NOTES
Physical Therapy Daily Treatment Note      [address]  [facility] [address]  [phone]  Patient: Nabor Muhammad Dr.   : 1955  Referring practitioner: Terry Zarate MD   Date of Initial Visit: Type: THERAPY  Noted: 3/8/2024  Today's Date: 2024  Patient seen for 10 sessions          Visit Diagnoses:    ICD-10-CM ICD-9-CM   1. S/P arthroscopic surgery of left knee  Z98.890 V45.89   2. Acute postoperative pain of left knee  G89.18 719.46    M25.562 338.18   3. Decreased ROM of left knee  M25.662 719.56   4. Difficulty walking due to knee joint  R26.2 719.7       Subjective   My knee overall is doing much better but the off- brace is uncomfortable because it slides when I'm up on my feet.  Uneven walking is probably the most difficult thing for me know. Back to work so on my feet more.  Sitting prolonged bothers my back and piriformis as well  Objective   See Exercise, Manual, and Modality Logs for complete treatment.   113 AA flexion    Assessment/Plan  Flexion ROM not as good today as last session but overall improved.  Tolerated progression of reps on several exercises indicating increasing strength/endurance.  Gait still antalgic without AD.    Continue to progress per POC    Timed:         Manual Therapy:    3     mins  13993;     Therapeutic Exercise:    25     mins  74993;     Neuromuscular Tyrone:    0    mins  19244;    Therapeutic Activity:     10     mins  09071;     Gait Trainin     mins  44019;     Ultrasound:     0     mins  95162;    Ionto                               0    mins   43156  Self Care                       0     mins   86876      Un-Timed:  Electrical Stimulation:    15     mins  68949 ( );  Dry Needling     0     mins self-pay  Traction     0     mins 02648  Canalith Repos    0     mins 97679    Timed Treatment:   38   mins   Total Treatment:     53   mins    Emerita Lopez PT  [place] License: [id number]

## 2024-04-04 ENCOUNTER — TREATMENT (OUTPATIENT)
Dept: PHYSICAL THERAPY | Facility: CLINIC | Age: 69
End: 2024-04-04
Payer: COMMERCIAL

## 2024-04-04 DIAGNOSIS — R26.2 DIFFICULTY WALKING DUE TO KNEE JOINT: ICD-10-CM

## 2024-04-04 DIAGNOSIS — M25.562 ACUTE POSTOPERATIVE PAIN OF LEFT KNEE: ICD-10-CM

## 2024-04-04 DIAGNOSIS — G89.18 ACUTE POSTOPERATIVE PAIN OF LEFT KNEE: ICD-10-CM

## 2024-04-04 DIAGNOSIS — Z98.890 S/P ARTHROSCOPIC SURGERY OF LEFT KNEE: Primary | ICD-10-CM

## 2024-04-04 DIAGNOSIS — M25.662 DECREASED ROM OF LEFT KNEE: ICD-10-CM

## 2024-04-04 NOTE — PROGRESS NOTES
Physical Therapy Daily Treatment Note    Patient: Nabor Muhammad Dr.  : 1955  Referring practitioner: Terry Zarate MD  Today's Date: 2024    VISIT#: 11      Subjective   Nabor Muhammad reports: Doing ok, but noticed he has some skin irritation on the inside of his knee. Otherwise doing ok, knee is just achey.      Objective     Noted skin irritation on the medial side of left knee inferior to joint line, about the size and shape of a black bean, red border. No sign of infection but educated to keep on eye on it and monitor to make sure it is improving.     See Exercise, Manual, and Modality Logs for complete treatment.     Patient Education: continue HEP, monitor burn.     Assessment/Plan  Good response to session overall. Reports of muscle fatigue after but no increased pain. Held estim today due to skin burn after last session. Limited standing activity today due to reports of having a busy day at work ahead.     Progress per Plan of Care            Timed:         Manual Therapy:    3     mins  17822;     Therapeutic Exercise:    25     mins  89102;     Neuromuscular Tyrone:        mins  15041;    Therapeutic Activity:     12     mins  76451;     Gait Training:           mins  08600;     Ultrasound:          mins  37230;    Ionto:                                   mins   76010  Self Care:                            mins   61391    Un-Timed:  Electrical Stimulation:         mins  33302 ( );  Dry Needling          mins self-pay  Traction          mins 32242  Re-Eval                               mins  22496    Timed Treatment:   40   mins   Total Treatment:     40   mins    Kimberly Farias, PT  Physical Therapist  [place] PT license #: [id number]  [place] PT license #: [id number]

## 2024-04-09 ENCOUNTER — TREATMENT (OUTPATIENT)
Dept: PHYSICAL THERAPY | Facility: CLINIC | Age: 69
End: 2024-04-09
Payer: COMMERCIAL

## 2024-04-09 DIAGNOSIS — R26.2 DIFFICULTY WALKING DUE TO KNEE JOINT: ICD-10-CM

## 2024-04-09 DIAGNOSIS — M25.562 ACUTE POSTOPERATIVE PAIN OF LEFT KNEE: ICD-10-CM

## 2024-04-09 DIAGNOSIS — Z98.890 S/P ARTHROSCOPIC SURGERY OF LEFT KNEE: Primary | ICD-10-CM

## 2024-04-09 DIAGNOSIS — G89.18 ACUTE POSTOPERATIVE PAIN OF LEFT KNEE: ICD-10-CM

## 2024-04-09 DIAGNOSIS — M25.662 DECREASED ROM OF LEFT KNEE: ICD-10-CM

## 2024-04-09 NOTE — PROGRESS NOTES
Physical Therapy Progress Note  4/09/2024  Terry Zarate MD    Re: Nabor Muhammad Dr.      ________________________________________________________________     Nabor Muhammad Dr., Patient seen for 12 sessions.  Treatment has consisted of: ROM, strengthening, stretching, patellar mobs, passive knee flexion, ice, IFC, and pt has been issued a HEP.       S:  Nabor Muhammad Dr. states: his knee hurts and did not realize how long of a recovery this was going to be compared to his last knee scopes.  Pt is trying to not have to take pain pills, but sometimes he will at night to get some sleep.  Pt states last night he did not sleep well.  Pt has been wearing his knee brace, but he does not like it.  The brace slides down after a while.       Subjective     Objective          Observations   Left Knee   Positive for effusion and incision.       Tenderness   Left Knee   Tenderness in the pes anserinus.     Additional Tenderness Details  Minimal tenderness over the femoral condyle and proximal medial tibia     Active Range of Motion   Left Knee   Flexion: 117 degrees   Extension: 0 degrees     Patellar Mobility   Left Knee Patellar tendons within functional limits include the medial, lateral, superior and inferior.     Strength/Myotome Testing     Left Hip   Planes of Motion   Flexion: 5  Abduction: 5  Adduction: 4+    Left Knee   Flexion: 5  Extension: 5    Swelling     Left Knee Girth Measurement (cm)   Joint line: 45.8.  10 cm above joint line: SP 50.2.  10 cm below joint line: IP 43.9.    Ambulation     Observational Gait   Gait: antalgic   Left swing time and left step length within functional limits. Decreased walking speed and left stance time.   Left foot contact pattern: heel to toe    Additional Observational Gait Details  Pt ambulates into the clinic with minimal antalgic gait left LE without an AD      See Exercise, Manual, and Modality Logs for complete treatment.       Assessment & Plan        Assessment  Assessment details: Pt is progressing well with therapy, but continues to c/o left medial knee pain and tenderness.  Pt has met 4/6 STGs and  1/4 LTGs.  Pt has good AROM, good hip and knee strength, and improving functional mobility.  Pt with more knee effusion this week, but pt is back to work for the second week.  Pt tolerates all stretches and strengthening exercises with some discomfort during the CKC exercises.  Pt is compliant with his HEP.  Pt could benefit from additional PT for ROM and strength, but please advise.          Prognosis: good     Goals  Plan Goals: STGs: 2-6 weeks  1. Decrease left  knee pain to a 5/10 with standing and ambulation.  MET  2. Increase left knee AROM 0-115 degrees for improved ability to dress his left LE.  MET  3.  Decrease left medial and lateral knee joint tenderness to minimal for improved ability to perform his ADLs and sleep through the night.   PARTIALLY MET   4.  Increase B hamstring flexibility to WNL with SLR test to increase ability to dress his LE.  MET   5.  Pt ambulates into clinic without an AD minimal antalgic gait left LE getting good heel strike to toe off gait pattern.   MET   6.  Decrease left knee effusion at girth measurements by 2-3 cm for improved ROM and functional mobility.  PROGRESSING      LTGs: 5-8 weeks  1.  Pt Independent with HEP for self management of symptoms.MET   2.  Increase left knee AROM 0-120 for improved ability to ambulate, go up/down steps, and perform his ADLS.  PROGRESSING   3.  Increase left knee and hip strength to 5/5 for improved stability with standing and ambulation.   PARTIALLY MET   4.  Decrease left knee pain to < or = 2/10 with standing and ambulation.  PROGRESSING   5.  Pt ambulates left LE without an antalgic gait getting good heel strike and toe off gait pattern.  PROGRESSING        Progress per Plan of Care         Manual Therapy:         mins  83039;  Therapeutic Exercise:   25      mins  00207;      Neuromuscular Tyrone:        mins  12867;    Therapeutic Activity:    15      mins  30202;     Gait Training:           mins  28767;     Ultrasound:          mins  55902;    Electrical Stimulation:         mins  26071 ( );  Traction  ___  mins  53501    Timed Treatment:   40   mins   Total Treatment:    60    mins    Graciela Trejo, PT  Physical Therapist [place] [id number]

## 2024-04-09 NOTE — LETTER
Physical Therapy Progress Note  4/09/2024  Terry Zarate MD    Re: Nabor Muhammad Dr.      ________________________________________________________________     Nabor Muhammad Dr., Patient seen for 12 sessions.  Treatment has consisted of: ROM, strengthening, stretching, patellar mobs, passive knee flexion, ice, IFC, and pt has been issued a HEP.       S:  Nabor Muhammad Dr. states: his knee hurts and did not realize how long of a recovery this was going to be compared to his last knee scopes.  Pt is trying to not have to take pain pills, but sometimes he will at night to get some sleep.  Pt states last night he did not sleep well.  Pt has been wearing his knee brace, but he does not like it.  The brace slides down after a while.       Subjective     Objective          Observations   Left Knee   Positive for effusion and incision.       Tenderness   Left Knee   Tenderness in the pes anserinus.     Additional Tenderness Details  Minimal tenderness over the femoral condyle and proximal medial tibia     Active Range of Motion   Left Knee   Flexion: 117 degrees   Extension: 0 degrees     Patellar Mobility   Left Knee Patellar tendons within functional limits include the medial, lateral, superior and inferior.     Strength/Myotome Testing     Left Hip   Planes of Motion   Flexion: 5  Abduction: 5  Adduction: 4+    Left Knee   Flexion: 5  Extension: 5    Swelling     Left Knee Girth Measurement (cm)   Joint line: 45.8.  10 cm above joint line: SP 50.2.  10 cm below joint line: IP 43.9.    Ambulation     Observational Gait   Gait: antalgic   Left swing time and left step length within functional limits. Decreased walking speed and left stance time.   Left foot contact pattern: heel to toe    Additional Observational Gait Details  Pt ambulates into the clinic with minimal antalgic gait left LE without an AD      See Exercise, Manual, and Modality Logs for complete treatment.       Assessment & Plan        Assessment  Assessment details: Pt is progressing well with therapy, but continues to c/o left medial knee pain and tenderness.  Pt has met 4/6 STGs and  1/4 LTGs.  Pt has good AROM, good hip and knee strength, and improving functional mobility.  Pt with more knee effusion this week, but pt is back to work for the second week.  Pt tolerates all stretches and strengthening exercises with some discomfort during the CKC exercises.  Pt is compliant with his HEP.  Pt could benefit from additional PT for ROM and strength, but please advise.          Prognosis: good     Goals  Plan Goals: STGs: 2-6 weeks  1. Decrease left  knee pain to a 5/10 with standing and ambulation.  MET  2. Increase left knee AROM 0-115 degrees for improved ability to dress his left LE.  MET  3.  Decrease left medial and lateral knee joint tenderness to minimal for improved ability to perform his ADLs and sleep through the night.   PARTIALLY MET   4.  Increase B hamstring flexibility to WNL with SLR test to increase ability to dress his LE.  MET   5.  Pt ambulates into clinic without an AD minimal antalgic gait left LE getting good heel strike to toe off gait pattern.   MET   6.  Decrease left knee effusion at girth measurements by 2-3 cm for improved ROM and functional mobility.  PROGRESSING      LTGs: 5-8 weeks  1.  Pt Independent with HEP for self management of symptoms.MET   2.  Increase left knee AROM 0-120 for improved ability to ambulate, go up/down steps, and perform his ADLS.  PROGRESSING   3.  Increase left knee and hip strength to 5/5 for improved stability with standing and ambulation.   PARTIALLY MET   4.  Decrease left knee pain to < or = 2/10 with standing and ambulation.  PROGRESSING   5.  Pt ambulates left LE without an antalgic gait getting good heel strike and toe off gait pattern.  PROGRESSING        Progress per Plan of Care         Manual Therapy:         mins  09748;  Therapeutic Exercise:   25      mins  31922;      Neuromuscular Tyrone:        mins  37897;    Therapeutic Activity:    15      mins  65489;     Gait Training:           mins  25738;     Ultrasound:          mins  55423;    Electrical Stimulation:         mins  86171 ( );  Traction  ___  mins  11629    Timed Treatment:   40   mins   Total Treatment:    60    mins    Graciela Trejo, PT  Physical Therapist [place] [id number]

## 2024-04-10 ENCOUNTER — OFFICE VISIT (OUTPATIENT)
Dept: ORTHOPEDIC SURGERY | Facility: CLINIC | Age: 69
End: 2024-04-10
Payer: COMMERCIAL

## 2024-04-10 VITALS — WEIGHT: 271 LBS | BODY MASS INDEX: 36.7 KG/M2 | HEIGHT: 72 IN

## 2024-04-10 DIAGNOSIS — Z98.890 STATUS POST ARTHROSCOPIC KNEE SURGERY: Primary | ICD-10-CM

## 2024-04-10 PROCEDURE — 99024 POSTOP FOLLOW-UP VISIT: CPT | Performed by: ORTHOPAEDIC SURGERY

## 2024-04-10 RX ORDER — PREDNISONE 10 MG/1
TABLET ORAL
Qty: 39 TABLET | Refills: 0 | Status: SHIPPED | OUTPATIENT
Start: 2024-04-10

## 2024-04-10 NOTE — PROGRESS NOTES
CC: Follow-up status post left knee scope with partial medial lateral meniscectomy, medial femoral condyle and medial tibial plateau subchondroplasty-3/5/2024    Interval history: Patient returns clinic today stating he is having a fair amount of moderate pain particular on the medial aspect of his knee in the region where incisions were made for his subchondroplasty.  In regards to joint pain he has had some that has been moderate intensity.  He has been working with physical therapy and feels like his motion and strength are continuing to improve though he has some difficulty with deep flexion.  Denies any fevers chills or sweats, denies any new numbness or tingling, denies any issues with his incisions    Exam:  Left knee-moderate effusion, active range of motion 0 to 115 degrees, 4+ out of 5 strength on flexion and extension.  Maximal tenderness to deep palpation over medial tibial plateau and medial femoral condyle at site of subchondroplasty injection.  Mildly positive active patellar compression test.  Stable to varus and valgus stress 0 and 30 degrees.    Impression: Status post left knee scope with partial medial and lateral meniscectomy, medial femoral condyle and medial tibial plateau subchondroplasty    Plan:  Reviewed with patient findings from his arthroscopy indicating bone-on-bone kissing lesion of medial compartment.  We did discuss that he may need to consider proceeding with arthroplasty options especially if we cannot get his pain and symptoms to improve though I would give it at least 10 to 12 weeks to see how much improvement we can get from his arthroscopy and subchondroplasty that we recently completed.  In order to try to control pain in the short-term we will go with a prednisone taper currently as well as a topical compounded cream-prescription written.  I did offer patient local injection at subchondroplasty sites to try to help relieve his periosteal pain as well as option for  intra-articular injection but he has declined that at this time  He will follow-up in 2 weeks and let me know if his symptoms have improved-if not we will more likely consider injection options  I will reach out to his physical therapist for consideration of any soft tissue modalities to try to help his medial sided pain as well.

## 2024-04-11 ENCOUNTER — TREATMENT (OUTPATIENT)
Dept: PHYSICAL THERAPY | Facility: CLINIC | Age: 69
End: 2024-04-11

## 2024-04-11 ENCOUNTER — TREATMENT (OUTPATIENT)
Dept: PHYSICAL THERAPY | Facility: CLINIC | Age: 69
End: 2024-04-11
Payer: COMMERCIAL

## 2024-04-11 DIAGNOSIS — M25.662 DECREASED ROM OF LEFT KNEE: ICD-10-CM

## 2024-04-11 DIAGNOSIS — G89.18 ACUTE POSTOPERATIVE PAIN OF LEFT KNEE: ICD-10-CM

## 2024-04-11 DIAGNOSIS — M25.562 ACUTE POSTOPERATIVE PAIN OF LEFT KNEE: ICD-10-CM

## 2024-04-11 DIAGNOSIS — R26.2 DIFFICULTY WALKING DUE TO KNEE JOINT: ICD-10-CM

## 2024-04-11 DIAGNOSIS — Z98.890 S/P ARTHROSCOPIC SURGERY OF LEFT KNEE: Primary | ICD-10-CM

## 2024-04-11 NOTE — PROGRESS NOTES
Physical Therapy Daily Treatment Note    Patient: Nabor Muhammad Dr.   : 1955  Referring practitioner: Terry Zarate MD  Date of Initial Visit: Type: THERAPY  Noted: 3/8/2024  Today's Date: 2024  Patient seen for 13 sessions       Visit Diagnoses:    ICD-10-CM ICD-9-CM   1. S/P arthroscopic surgery of left knee  Z98.890 V45.89   2. Acute postoperative pain of left knee  G89.18 719.46    M25.562 338.18   3. Decreased ROM of left knee  M25.662 719.56   4. Difficulty walking due to knee joint  R26.2 719.7       Nabor Muhammad Dr. reports: it still hurts.  He made it through another morning.  Trying to not have to take the pain meds.      Subjective       Objective   See Exercise, Manual, and Modality Logs for complete treatment.     Mild left medial knee joint line tenderness and pain.      Pt received a trial of dry needling today from Trice Mejia PT      Assessment & Plan       Assessment  Assessment details: Pt continues to c/o medial left knee pain, so modified pt's exercises to light strengthening and ROM exercises.  Pt received a trial of dry needling from Trice Mejia, PT F/B pulsed US to the medial knee joint line.  Instructed pt to only use heat and hold NSAIDs for the next 24 hours.  Will continue to see pt 1-2x/week for US, light ROM/strengthening exercises and modalities PRN for 2 more weeks.  Check pt's response to the dry needling next visit.            Progress per Plan of Care      Timed:         Manual Therapy:         mins  01915;     Therapeutic Exercise:   20      mins  19498;     Neuromuscular Tyrone:        mins  43936;    Therapeutic Activity:          mins  35774;     Gait Training:           mins  90446;     Ultrasound:    8      mins  87507;    Ionto                                   mins   13852  Self Care                            mins   93401  Canalith Repos         mins 58779      Un-Timed:  Electrical Stimulation:         mins  85341 ( );  Dry Needling           mins self-pay  Traction          mins 06452      Timed Treatment:   28   mins   Total Treatment:    51    mins    Graciela Trejo, PT  [place] License: [id number]

## 2024-04-11 NOTE — PROGRESS NOTES
Physical Therapy Daily Progress Note        Patient: Nabor Muhammad Dr.   : 1955  Diagnosis/ICD-10 Code:  S/P arthroscopic surgery of left knee [Z98.890]  Referring practitioner: No ref. provider found  Date of Initial Visit: Type: THERAPY  Noted: 2024  Today's Date: 2024  Patient seen for 1 sessions         Nabor Muhammad reports: surgeon recommending dry needling for inflammation control. Also prescribed steroid pack.     History:  pt denies active infection, blood bourne illness, needle phobia, use of blood thinners, any compromised immune system      Objective   See Exercise, Manual, and Modality Logs for complete treatment.     Patient was instructed in indications for dry needling treatment,  conditions treated, procedure to be performed, possible side effects, contraindications, and risks of the procedure.  All questions were answered.  Patient agreed to have dry needling treatment performed and signed the consent.      Assessment/Plan    Used threading and direct techniques, obtained consent to treat after discussing benefits and risks of dry needling. Clean needle technique and gloves used at all times. Precautions utilized for lung fields and neurovascular structures. Pt in supine with knee in ~30 degrees of flexion for needling of medial L knee joint/muscles. No adverse response noted. Manual palpation and assessment performed before, during and after needling.  Plan to continue needling as needed and instructed to apply heat to sore areas tonight and avoid anti-inflammatories for 24 hours.          Manual Therapy:    -     mins  08762;  Therapeutic Exercise:    -     mins  52415;     Neuromuscular Tyrone:    -    mins  61898;    Therapeutic Activity:     -     mins  04778;     Gait Training:      -     mins  07071;     Ultrasound:     -     mins  03143;    Electrical Stimulation:    -     mins  80974 ( );  Dry Needling     15     mins /    Low Eval                        -      Mins  28669  Mod Eval                        -     Mins  41706  High Eval                       -     Mins  86804    Timed Treatment:   -   mins   Total Treatment:     15   mins TRIAL TODAY    Trice Mejia, PT  Physical Therapist    License #: [id number]

## 2024-04-16 ENCOUNTER — TREATMENT (OUTPATIENT)
Dept: PHYSICAL THERAPY | Facility: CLINIC | Age: 69
End: 2024-04-16
Payer: COMMERCIAL

## 2024-04-16 DIAGNOSIS — G89.18 ACUTE POSTOPERATIVE PAIN OF LEFT KNEE: ICD-10-CM

## 2024-04-16 DIAGNOSIS — M25.562 ACUTE POSTOPERATIVE PAIN OF LEFT KNEE: ICD-10-CM

## 2024-04-16 DIAGNOSIS — M25.662 DECREASED ROM OF LEFT KNEE: ICD-10-CM

## 2024-04-16 DIAGNOSIS — Z98.890 S/P ARTHROSCOPIC SURGERY OF LEFT KNEE: Primary | ICD-10-CM

## 2024-04-16 DIAGNOSIS — R26.2 DIFFICULTY WALKING DUE TO KNEE JOINT: ICD-10-CM

## 2024-04-16 PROCEDURE — 20561 NDL INSJ W/O NJX 3+ MUSC: CPT | Performed by: PHYSICAL THERAPIST

## 2024-04-16 PROCEDURE — 97110 THERAPEUTIC EXERCISES: CPT | Performed by: PHYSICAL THERAPIST

## 2024-04-16 PROCEDURE — 97035 APP MDLTY 1+ULTRASOUND EA 15: CPT | Performed by: PHYSICAL THERAPIST

## 2024-04-16 NOTE — PROGRESS NOTES
Physical Therapy Daily Treatment Note      Patient: Nabor Muhammad Dr.   : 1955  Referring practitioner: Terry Zarate MD  Date of Initial Visit: Type: THERAPY  Noted: 2024  Today's Date: 2024  Patient seen for 2 sessions       Visit Diagnoses:    ICD-10-CM ICD-9-CM   1. S/P arthroscopic surgery of left knee  Z98.890 V45.89   2. Acute postoperative pain of left knee  G89.18 719.46    M25.562 338.18   3. Decreased ROM of left knee  M25.662 719.56   4. Difficulty walking due to knee joint  R26.2 719.7       Subjective   Nabor Muhammad Dr. reports: some improvement after the last dry needling session and started the prednisone pack the next day.  I forgot to take my dose this morning.  I was on my feet a lot on  and my knee has been achy since.     Objective   See Exercise, Manual, and Modality Logs for complete treatment.       Assessment/Plan   Used direct and pistoning techniques, with consent on file. Clean needle technique and gloves used at all times. Precautions utilized for lung fields and neurovascular structures. Pt in supine as well as knee flexed to ~90 degrees of flexion for needling of medial L knee joint/muscles. No adverse response noted though with pain with needling to medial femoral condyle. Manual palpation and assessment performed before, during and after needling.  Plan to continue needling as needed and instructed to apply heat to sore areas tonight and avoid anti-inflammatories for 24 hours if agreed by pharmacist. I held resistance on his open chain ex this visit following dry needling. I discussed with Dr. Muhammad the option of using a SPC to offload his joint during his workday.   Progress per Plan of Care             Timed:         Manual Therapy:         mins  35410;     Therapeutic Exercise:   17      mins  10114;     Neuromuscular Tyrone:        mins  70846;    Therapeutic Activity:          mins  75914;     Gait Training:           mins  93256;      Ultrasound:     8     mins  76663;    Ionto                                   mins   94728  Self Care                            mins   46959    Un-Timed:  Electrical Stimulation:         mins  25644 ( );  Dry Needling   (1-2 muscles)       mins 20560 (Self-pay)  Dry Needling (3-4 muscles)  10      mins 20561 (Self-pay)  Dry Needling Trial          mins DRYNDLTRIAL  (No Charge)  Traction          mins 29391      Timed Treatment:  25   mins   Total Treatment:    40   mins    Gemini Gilbert PT  [place] License: #[id number]

## 2024-04-18 ENCOUNTER — TREATMENT (OUTPATIENT)
Dept: PHYSICAL THERAPY | Facility: CLINIC | Age: 69
End: 2024-04-18
Payer: COMMERCIAL

## 2024-04-18 DIAGNOSIS — M25.662 DECREASED ROM OF LEFT KNEE: ICD-10-CM

## 2024-04-18 DIAGNOSIS — R26.2 DIFFICULTY WALKING DUE TO KNEE JOINT: ICD-10-CM

## 2024-04-18 DIAGNOSIS — G89.18 ACUTE POSTOPERATIVE PAIN OF LEFT KNEE: ICD-10-CM

## 2024-04-18 DIAGNOSIS — M25.562 ACUTE POSTOPERATIVE PAIN OF LEFT KNEE: ICD-10-CM

## 2024-04-18 DIAGNOSIS — Z98.890 S/P ARTHROSCOPIC SURGERY OF LEFT KNEE: Primary | ICD-10-CM

## 2024-04-18 NOTE — PROGRESS NOTES
Physical Therapy Daily Treatment Note      Patient: Nabor Muhammad Dr.   : 1955  Referring practitioner: Terry Zarate MD  Date of Initial Visit: Type: THERAPY  Noted: 3/8/2024  Today's Date: 2024  Patient seen for 15 sessions       Visit Diagnoses:    ICD-10-CM ICD-9-CM   1. S/P arthroscopic surgery of left knee  Z98.890 V45.89   2. Acute postoperative pain of left knee  G89.18 719.46    M25.562 338.18   3. Decreased ROM of left knee  M25.662 719.56   4. Difficulty walking due to knee joint  R26.2 719.7       Nabor Muhammad Dr. reports: he feels his knee pain is getting better.  It hurt a lot Tu night after the needling, but took his steroid yesterday morning and by the late morning his knee was feeling better.  It still hurts if he has to stand a lot.      Subjective       Objective          Active Range of Motion   Left Knee   Flexion: 115 degrees     Additional Active Range of Motion Details  116 degrees of knee flexion after performing heel slides AAROM       See Exercise, Manual, and Modality Logs for complete treatment.       Assessment & Plan       Assessment  Assessment details: Pt is starting to report of some improvement in his left medial knee joint pain and tenderness since dry needling and US treatments have been initiated.  Pt with improved knee AROM, decreasing observable knee effusion and tenderness.  Pt tolerated all [facility] strengthening exercises.  Pt increased reps to 25 without difficulty.   Will continue to see pt 1-2x/week for US, light ROM/strengthening exercises and modalities PRN.          Progress per Plan of Care      Timed:         Manual Therapy:         mins  67994;     Therapeutic Exercise:   20      mins  55414;     Neuromuscular Tyrone:        mins  37310;    Therapeutic Activity:     10     mins  56658;     Gait Training:           mins  51403;     Ultrasound:     8     mins  13241;    Ionto                                   mins   43851  Self Care                             mins   22759  [facility]         mins 34970      Un-Timed:  Electrical Stimulation:         mins  57257 ( );  Dry Needling          mins self-pay  Traction          mins 37694      Timed Treatment:  38    mins   Total Treatment:    48    mins    Graciela Trejo, PT  [place] License: [id number]

## 2024-04-24 ENCOUNTER — OFFICE VISIT (OUTPATIENT)
Dept: ORTHOPEDIC SURGERY | Facility: CLINIC | Age: 69
End: 2024-04-24
Payer: COMMERCIAL

## 2024-04-24 VITALS — HEIGHT: 72 IN | WEIGHT: 271 LBS | BODY MASS INDEX: 36.7 KG/M2

## 2024-04-24 DIAGNOSIS — M17.12 PRIMARY OSTEOARTHRITIS OF LEFT KNEE: ICD-10-CM

## 2024-04-24 DIAGNOSIS — Z98.890 STATUS POST ARTHROSCOPIC KNEE SURGERY: Primary | ICD-10-CM

## 2024-04-24 RX ADMIN — TRIAMCINOLONE ACETONIDE 80 MG: 40 INJECTION, SUSPENSION INTRA-ARTICULAR; INTRAMUSCULAR at 14:21

## 2024-04-24 RX ADMIN — LIDOCAINE HYDROCHLORIDE 8 ML: 10 INJECTION, SOLUTION EPIDURAL; INFILTRATION; INTRACAUDAL; PERINEURAL at 14:21

## 2024-04-24 NOTE — PROGRESS NOTES
Large Joint Arthrocentesis: L knee  Date/Time: 4/24/2024 2:21 PM  Consent given by: patient  Site marked: site marked  Timeout: Immediately prior to procedure a time out was called to verify the correct patient, procedure, equipment, support staff and site/side marked as required   Supporting Documentation  Indications: pain   Procedure Details  Location: knee - L knee  Preparation: Patient was prepped and draped in the usual sterile fashion  Needle size: 22 G  Approach: anterolateral  Medications administered: 8 mL lidocaine PF 1% 1 %; 80 mg triamcinolone acetonide 40 MG/ML  Patient tolerance: patient tolerated the procedure well with no immediate complications      CC: Follow-up status post left knee scope with partial medial lateral meniscectomy, medial femoral condyle and medial tibial plateau subchondroplasty-3/5/2024     Interval history: Patient returns clinic today stating that overall his medial tibia and medial femoral incisions and insertion site for his subchondroplasty he still is having fairly significant pain with weightbearing over his medial compartment localized medial tibial plateau and medial distal femoral condyle.  He denies any micah locking or catching.  Does note associated crepitus.  Minimal improvement with physical therapy, bracing, and medication.      Exam:  Left knee-moderate effusion, active range of motion 0 to 125 degrees, 4+ out of 5 strength on flexion and extension.  Maximal tenderness to along medial joint line, minimal residual tenderness along medial femoral condyle and medial tibial plateau adjacent to the joint line. Mildly positive active patellar compression test.  Stable to varus and valgus stress 0 and 30 degrees.     Impression: Status post left knee scope with partial medial and lateral meniscectomy, medial femoral condyle and medial tibial plateau subchondroplasty     Plan:  Discussed options at length with patient.  Given the fact that he is still having fairly  significant pain, likely due to his underlying advanced arthritic wear of his medial compartment as well as some arthritic wear of his patellofemoral compartment, we did discuss option for total knee arthroplasty.  At this point in time he wants to try to get back to work, we discussed the option for injection to try to help his pain in the short-term.  He was in agreement with this.  We did discuss that proceeding with injection would likely push this out a least 3 months before proceeding with knee arthroplasty if we do need to go that route.  He is going to transition to home exercise program to try to improve his motion strength and function.  May consider additional oral steroids if still having residual pain due to the fact that some of this may be coming from extra-articular origins especially any residual stress reaction in the bone.

## 2024-05-16 RX ORDER — LIDOCAINE HYDROCHLORIDE 10 MG/ML
8 INJECTION, SOLUTION EPIDURAL; INFILTRATION; INTRACAUDAL; PERINEURAL
Status: COMPLETED | OUTPATIENT
Start: 2024-04-24 | End: 2024-04-24

## 2024-05-16 RX ORDER — TRIAMCINOLONE ACETONIDE 40 MG/ML
80 INJECTION, SUSPENSION INTRA-ARTICULAR; INTRAMUSCULAR
Status: COMPLETED | OUTPATIENT
Start: 2024-04-24 | End: 2024-04-24

## 2024-06-28 ENCOUNTER — PREP FOR SURGERY (OUTPATIENT)
Dept: OTHER | Facility: HOSPITAL | Age: 69
End: 2024-06-28
Payer: COMMERCIAL

## 2024-06-28 DIAGNOSIS — M17.12 PRIMARY OSTEOARTHRITIS OF LEFT KNEE: Primary | ICD-10-CM

## 2024-06-28 PROBLEM — Z96.659 S/P TOTAL KNEE ARTHROPLASTY: Status: ACTIVE | Noted: 2024-06-28

## 2024-06-28 RX ORDER — MELOXICAM 7.5 MG/1
15 TABLET ORAL ONCE
OUTPATIENT
Start: 2024-06-28 | End: 2024-06-28

## 2024-06-28 RX ORDER — ACETAMINOPHEN 500 MG
1000 TABLET ORAL ONCE
OUTPATIENT
Start: 2024-06-28 | End: 2024-06-28

## 2024-06-28 RX ORDER — TRANEXAMIC ACID 10 MG/ML
1000 INJECTION, SOLUTION INTRAVENOUS ONCE
OUTPATIENT
Start: 2024-06-28 | End: 2024-06-28

## 2024-06-28 RX ORDER — PREGABALIN 75 MG/1
150 CAPSULE ORAL ONCE
OUTPATIENT
Start: 2024-06-28 | End: 2024-06-28

## 2024-07-11 ENCOUNTER — TELEPHONE (OUTPATIENT)
Dept: ORTHOPEDIC SURGERY | Facility: CLINIC | Age: 69
End: 2024-07-11

## 2024-07-11 NOTE — TELEPHONE ENCOUNTER
Caller: Nabor Muhammad Dr.    Relationship to patient: Self    Best call back number: [phone]    Patient is needing: PT IS RETURNING A MISSED CALL FROM [place].. PT WOULD LIKE A CALL BACK.. PT SAID HE WILL BE BACK IN HIS OFFICE ON MONDAY.. PLEASE ADVISE..

## 2024-07-12 ENCOUNTER — TELEPHONE (OUTPATIENT)
Dept: ORTHOPEDIC SURGERY | Facility: CLINIC | Age: 69
End: 2024-07-12
Payer: COMMERCIAL

## 2024-07-12 NOTE — TELEPHONE ENCOUNTER
Left vm for patient about his [place] paperwork. Wanted to let him know that is completed and has been faxed over. There is a $25 fee for completion of this paperwork, which he can pay at the office or pay over the phone.     Hub is okay to relay message to the patient.

## 2024-07-15 ENCOUNTER — PRE-ADMISSION TESTING (OUTPATIENT)
Dept: PREADMISSION TESTING | Facility: HOSPITAL | Age: 69
End: 2024-07-15
Payer: COMMERCIAL

## 2024-07-15 VITALS
HEART RATE: 68 BPM | HEIGHT: 71 IN | BODY MASS INDEX: 38.5 KG/M2 | RESPIRATION RATE: 20 BRPM | WEIGHT: 275 LBS | SYSTOLIC BLOOD PRESSURE: 122 MMHG | DIASTOLIC BLOOD PRESSURE: 78 MMHG | OXYGEN SATURATION: 95 %

## 2024-07-15 DIAGNOSIS — M17.12 PRIMARY OSTEOARTHRITIS OF LEFT KNEE: ICD-10-CM

## 2024-07-15 LAB
ABO GROUP BLD: NORMAL
ABO GROUP BLD: NORMAL
ANION GAP SERPL CALCULATED.3IONS-SCNC: 13.4 MMOL/L (ref 5–15)
APTT PPP: 29.4 SECONDS (ref 24.3–38.1)
BASOPHILS # BLD AUTO: 0.02 10*3/MM3 (ref 0–0.2)
BASOPHILS NFR BLD AUTO: 0.3 % (ref 0–1.5)
BLD GP AB SCN SERPL QL: NEGATIVE
BUN SERPL-MCNC: 22 MG/DL (ref 8–23)
BUN/CREAT SERPL: 17.9 (ref 7–25)
CALCIUM SPEC-SCNC: 9.2 MG/DL (ref 8.6–10.5)
CHLORIDE SERPL-SCNC: 103 MMOL/L (ref 98–107)
CO2 SERPL-SCNC: 21.6 MMOL/L (ref 22–29)
CREAT SERPL-MCNC: 1.23 MG/DL (ref 0.76–1.27)
DEPRECATED RDW RBC AUTO: 43.1 FL (ref 37–54)
EGFRCR SERPLBLD CKD-EPI 2021: 63.2 ML/MIN/1.73
EOSINOPHIL # BLD AUTO: 0.17 10*3/MM3 (ref 0–0.4)
EOSINOPHIL NFR BLD AUTO: 2.6 % (ref 0.3–6.2)
ERYTHROCYTE [DISTWIDTH] IN BLOOD BY AUTOMATED COUNT: 13 % (ref 12.3–15.4)
GLUCOSE SERPL-MCNC: 107 MG/DL (ref 65–99)
HCT VFR BLD AUTO: 41.7 % (ref 37.5–51)
HGB BLD-MCNC: 13.8 G/DL (ref 13–17.7)
IMM GRANULOCYTES # BLD AUTO: 0.01 10*3/MM3 (ref 0–0.05)
IMM GRANULOCYTES NFR BLD AUTO: 0.2 % (ref 0–0.5)
INR PPP: 1.03 (ref 0.9–1.1)
LYMPHOCYTES # BLD AUTO: 1.79 10*3/MM3 (ref 0.7–3.1)
LYMPHOCYTES NFR BLD AUTO: 26.9 % (ref 19.6–45.3)
MCH RBC QN AUTO: 29.4 PG (ref 26.6–33)
MCHC RBC AUTO-ENTMCNC: 33.1 G/DL (ref 31.5–35.7)
MCV RBC AUTO: 88.9 FL (ref 79–97)
MONOCYTES # BLD AUTO: 0.56 10*3/MM3 (ref 0.1–0.9)
MONOCYTES NFR BLD AUTO: 8.4 % (ref 5–12)
NEUTROPHILS NFR BLD AUTO: 4.11 10*3/MM3 (ref 1.7–7)
NEUTROPHILS NFR BLD AUTO: 61.6 % (ref 42.7–76)
PLATELET # BLD AUTO: 203 10*3/MM3 (ref 140–450)
PMV BLD AUTO: 10.8 FL (ref 6–12)
POTASSIUM SERPL-SCNC: 3.7 MMOL/L (ref 3.5–5.2)
PROTHROMBIN TIME: 13.5 SECONDS (ref 12.1–15)
RBC # BLD AUTO: 4.69 10*6/MM3 (ref 4.14–5.8)
RH BLD: POSITIVE
RH BLD: POSITIVE
SODIUM SERPL-SCNC: 138 MMOL/L (ref 136–145)
T&S EXPIRATION DATE: NORMAL
WBC NRBC COR # BLD AUTO: 6.66 10*3/MM3 (ref 3.4–10.8)

## 2024-07-15 PROCEDURE — 86901 BLOOD TYPING SEROLOGIC RH(D): CPT | Performed by: ORTHOPAEDIC SURGERY

## 2024-07-15 PROCEDURE — 86901 BLOOD TYPING SEROLOGIC RH(D): CPT

## 2024-07-15 PROCEDURE — 80048 BASIC METABOLIC PNL TOTAL CA: CPT | Performed by: ORTHOPAEDIC SURGERY

## 2024-07-15 PROCEDURE — 85610 PROTHROMBIN TIME: CPT | Performed by: ORTHOPAEDIC SURGERY

## 2024-07-15 PROCEDURE — 36415 COLL VENOUS BLD VENIPUNCTURE: CPT

## 2024-07-15 PROCEDURE — 86900 BLOOD TYPING SEROLOGIC ABO: CPT

## 2024-07-15 PROCEDURE — 86900 BLOOD TYPING SEROLOGIC ABO: CPT | Performed by: ORTHOPAEDIC SURGERY

## 2024-07-15 PROCEDURE — 87081 CULTURE SCREEN ONLY: CPT | Performed by: ORTHOPAEDIC SURGERY

## 2024-07-15 PROCEDURE — 85025 COMPLETE CBC W/AUTO DIFF WBC: CPT | Performed by: ORTHOPAEDIC SURGERY

## 2024-07-15 PROCEDURE — 85730 THROMBOPLASTIN TIME PARTIAL: CPT | Performed by: ORTHOPAEDIC SURGERY

## 2024-07-15 PROCEDURE — 86850 RBC ANTIBODY SCREEN: CPT | Performed by: ORTHOPAEDIC SURGERY

## 2024-07-15 NOTE — PAT
Pt here for PAT visit.  Pre-op tests completed, chg soap given, and instructions reviewed.  Instructed clears until 2 hrs prior to arrival time, voiced understanding. ERAS and ARROW pain pump handout given and reviewed with pt , pt unable to void during visit stated he would complete U/A at he Drs office

## 2024-07-15 NOTE — DISCHARGE INSTRUCTIONS
PRE-ADMISSION TESTING INSTRUCTIONS FOR TOTAL JOINT PATIENTS    Take these medications the morning of surgery with a small sip of water:  omeprazole      No aspirin, advil, aleve, ibuprofen, naproxen, diet pills, decongestants, or vitamin/herbal supplements for a week prior to your surgery.     Tylenol/Acetaminophen ok to take if needed.    Do not take any insulin or diabetes medications the morning of surgery.    Your surgeon may give you Bactroban to use prior to surgery. This will be started 5 days prior to surgery, follow the directions on your prescription from your surgeon for use.      General Instructions:    DO NOT EAT SOLID FOOD AFTER MIDNIGHT THE NIGHT BEFORE SURGERY. No gum, mints, or hard candy after midnight the night before surgery.  You may drink clear liquids the day of surgery up until 2 hours before your arrival time.  Clear liquids are liquids you can see through. Nothing RED in color.    Plain water    Sports drinks      Gelatin (Jell-O)  Fruit juices without pulp such as white grape juice and apple juice  Popsicles that contain no fruit or yogurt  Tea or coffee (no cream or milk added)    It is beneficial for you to have a clear drink that contains carbohydrates 2 hours prior to your arrival time.  DRINK A BOTTLE OF SPORTS DRINK 2 HOURS BEFORE YOUR ARRIVAL TIME. IF YOU ARE DIABETIC, DRINK A LOW OR NO SUGAR SPORTS DRINK. ANY COLOR EXCEPT RED.    Patients who avoid smoking, chewing tobacco and alcohol for 4 weeks prior to surgery have a reduced risk of post-operative complications.  If at all possible, quit smoking as many days before surgery as you can.    Do not smoke, use chewing tobacco or drink alcohol after midnight the day of surgery.    Bring your C-PAP/ BI-PAP machine if you use one.  Wear clean comfortable clothes.  Do not wear contact lenses, lotion, deodorant, or make-up.  Bring a case for your glasses if applicable.   You may brush your teeth the morning of surgery.  You may wear  dentures/partials, do not put adhesive/glue on them.  Leave all other jewelry and valuables at home.  NOTIFY YOUR SURGEON IF YOU BECOME ILL, HAVE A FEVER, PRODUCTIVE COUGH, OR CANNOT BE HERE THE DAY OF SURGERY.      Preventing a Surgical Site Infection:    Shower the night before and on the morning of surgery using the chlorhexidine soap you were given.  Use a clean washcloth with the soap.  Place clean sheets on your bed after showering the night before surgery. Do not use the CHG soap on your hair, face, or private areas. Wash your body gently for five (5) minutes. Do not scrub your skin.  Dry with a clean towel and dress in clean clothing.    Do not shave the surgical area for 10 days-2 weeks prior to surgery  because the razor can irritate skin and make it easier to develop an infection.  Make sure you, your family, and all healthcare providers clean their hands with soap and water or an alcohol based hand  before caring for you or your wound.      Day of surgery:    Your surgeon’s office will advise you of your arrival time for the day of surgery.    Upon arrival, a Pre-op nurse and Anesthesia provider will review your health history, obtain vital signs, and answer questions you may have. The anesthesia provider will also discuss the type of anesthesia that will be needed for your procedure, which may include general anesthesia. The only belongings needed at this time will be your home medications and if applicable your C-PAP/BI-PAP machine.  If you are staying overnight your family can leave the rest of your belongings in the car and bring them to your room later.  A Pre-op nurse will start an IV and you may receive medication in preparation for surgery, including something to help you relax.  Your family will be able to see you in the Pre-op area.  While you are in surgery your family should notify the waiting room  if they leave the waiting room area and provide a contact phone  number.    If you have any questions, you can call the Pre-Admission Department at [phone] or your surgeon's office.    Please be aware that surgery does come with discomfort.  We want to make every effort to control your discomfort so please discuss any uncontrolled symptoms with your nurse.   Your doctor will most likely have prescribed pain medications.     You may have bruising or discomfort from the tourniquet used in surgery.     Please leave all luggage in the car the morning of surgery.  You will be transported to your hospital room following the recovery period.  Your family can get your luggage at that time.      You may receive a survey regarding the care you received. Your feedback is very important and will be used to collect the necessary data to help us to continue to provide excellent care.

## 2024-07-16 LAB — MRSA SPEC QL CULT: NORMAL

## 2024-07-16 NOTE — CASE MANAGEMENT/SOCIAL WORK
Continued Stay Note  YUMIKO Yates     Patient Name: Nabor Muhammad Dr.  MRN: [id number]  Today's Date: 7/16/2024    Admit Date: (Not on file)        Discharge Plan       Row Name 07/16/24 1527       Plan    Plan Comments CM spoke with patient today to arrange any needed equipment and arrange OP PT for his surgery with Dr. Zarate on 7/23/24. Patient states he has a cane and rolling walker at home and does not anticipate needing a BSC at discharge. He states he would like to use OP PT at [facility] and is agreeable for CM to arrange this service. He had no other questions. CHAY called and spoke with Chary at rehab and she has scheduled an appointment for 7/25/24 @ 2:00pm. CM will follow.                   Discharge Codes    No documentation.                       Chel Huggins RN

## 2024-07-22 ENCOUNTER — ANESTHESIA EVENT (OUTPATIENT)
Dept: PERIOP | Facility: HOSPITAL | Age: 69
End: 2024-07-22
Payer: COMMERCIAL

## 2024-07-22 ENCOUNTER — TELEMEDICINE (OUTPATIENT)
Dept: ORTHOPEDIC SURGERY | Facility: CLINIC | Age: 69
End: 2024-07-22
Payer: COMMERCIAL

## 2024-07-22 VITALS — HEIGHT: 71 IN | WEIGHT: 275 LBS | BODY MASS INDEX: 38.5 KG/M2

## 2024-07-22 DIAGNOSIS — M17.12 PRIMARY OSTEOARTHRITIS OF LEFT KNEE: Primary | ICD-10-CM

## 2024-07-22 DIAGNOSIS — Z98.890 STATUS POST ARTHROSCOPIC KNEE SURGERY: ICD-10-CM

## 2024-07-22 PROCEDURE — 99024 POSTOP FOLLOW-UP VISIT: CPT | Performed by: ORTHOPAEDIC SURGERY

## 2024-07-22 NOTE — H&P
History & Physical       Patient: Nabor Muhammad Dr.    YOB: 1955    Medical Record Number: [id number]    Surgeon:  Dr. Terry Zarate    Chief Complaints:   Left knee pain, DJD    Subjective:  This problem is not new to this examiner.     History of Present Illness: 68 y.o. male presents with   Left knee pain, DJD. Onset of symptoms was years ago and has been progressively worsening despite more conservative treatment measures.  Symptoms are associated with ability to move, exercise, and perform activities of daily living.  Symptoms are aggravated by weight bearing and ROM necessary for activities of daily living.   Symptoms improve with rest, ice and elevation only minimally.      Allergies:   Allergies   Allergen Reactions    Codeine GI Intolerance       Medications:   Home Medications:  No current facility-administered medications on file prior to encounter.     Current Outpatient Medications on File Prior to Encounter   Medication Sig    finasteride (PROSCAR) 5 MG tablet Take 1 tablet by mouth Daily.    naloxone (NARCAN) 4 MG/0.1ML nasal spray Call 911. Don't prime. [place] in 1 nostril for overdose. Repeat in 2-3 minutes in other nostril if no or minimal breathing/responsiveness.    olmesartan-hydrochlorothiazide (BENICAR HCT) 40-25 MG per tablet Take 1 tablet by mouth Daily.    omeprazole (priLOSEC) 20 MG capsule Take 1 capsule by mouth Daily.    ondansetron (Zofran) 4 MG tablet Take 1 tablet by mouth Every 8 (Eight) Hours As Needed for Nausea or Vomiting.    tamsulosin (FLOMAX) 0.4 MG capsule 24 hr capsule Take 2 capsules by mouth Daily.    vitamin D3 125 MCG (5000 UT) capsule capsule Take 1 capsule by mouth Daily.     Current Medications:  Scheduled Meds:acetaminophen, 1,000 mg, Oral, Once  ethyl alcohol, 2 Swab, Nasal, Once  famotidine, 20 mg, Intravenous, 60 Min Pre-Op  meloxicam, 15 mg, Oral, Once  pregabalin, 150 mg, Oral, Once  sodium chloride, 10 mL, Intravenous, Q12H  tranexamic  "acid, 1,000 mg, Intravenous, Once  tranexamic acid, 1,000 mg, Intravenous, Once      Continuous Infusions:bupivacaine (MARCAINE) 0.125% in 0.9% sodium chloride 400 mL elastomeric pump \"pain ball\", 8 mL/hr  lactated ringers, 9 mL/hr      PRN Meds:.  dexAMETHasone    lactated ringers    midazolam    ondansetron    sodium chloride    sodium chloride    I have reviewed the patient's medical history in detail and updated the computerized patient record.  Review and summarization of old records include:    Past Medical History:   Diagnosis Date    Arthritis     GERD (gastroesophageal reflux disease)     Hypertension     Lumbosacral disc disease     OA (osteoarthritis)     YANETH (obstructive sleep apnea)     CPAP    Spinal stenosis of lumbar region         Past Surgical History:   Procedure Laterality Date    CHOLECYSTECTOMY      FEMUR CLOSED REDUCTION Left 1970    KNEE ARTHROSCOPY Left 3/5/2024    Procedure: LEFT KNEE ARTHROSCOPY WITH OPEN ARTHROTOMY SUBCHONDROPLASTY, partial medial and lateral meniscectomy;  Surgeon: Terry Zarate MD;  Location: [facility];  Service: Orthopedics;  Laterality: Left;    KNEE MENISCECTOMY Right 7/21/2021    Procedure: KNEE PARTIAL MEDIAL MENISCECTOMY,;  Surgeon: Terry Zarate MD;  Location: [facility];  Service: Orthopedics;  Laterality: Right;    SHOULDER SURGERY Right 1998    TONSILLECTOMY AND ADENOIDECTOMY      WRIST GANGLION EXCISION Bilateral     R-1990 L-1996        Social History     Occupational History    Not on file   Tobacco Use    Smoking status: Never     Passive exposure: Never    Smokeless tobacco: Never   Vaping Use    Vaping status: Never Used   Substance and Sexual Activity    Alcohol use: Yes     Comment: occ    Drug use: No    Sexual activity: Defer      Social History     Social History Narrative    Not on file        Family History   Problem Relation Age of Onset    Breast cancer Mother     COPD Father     Malig Hyperthermia Neg Hx        ROS: 14 point review " of systems was performed and was negative except for documented findings in HPI and today's encounter.     Allergies:   Allergies   Allergen Reactions    Codeine GI Intolerance     Constitutional:  Denies fever, shaking or chills   Eyes:  Denies change in visual acuity   HENT:  Denies nasal congestion or sore throat   Respiratory:  Denies cough or shortness of breath   Cardiovascular:  Denies chest pain or severe LE edema   GI:  Denies abdominal pain, nausea, vomiting, bloody stools or diarrhea   Musculoskeletal:  Denies numbness tingling or loss of motor function except as outlined above in history of present illness.  : Denies painful urination or hematuria  Integument:  Denies rash, lesion or ulceration   Neurologic:  Denies headache or focal weakness  Endocrine:  Denies lymphadenopathy  Psych:  Denies confusion or change in mental status   Hem:  Denies active bleeding    Physical Exam: 68 y.o. male  Body mass index is 36.9 kg/m².  Vitals:    07/23/24 0714   Temp: 98.3 °F (36.8 °C)     Vital signs reviewed.   General Appearance:    Alert, cooperative, in no acute distress                  Eyes: conjunctivae clear  ENT: external ears and nose atraumatic  CV: no peripheral edema  Resp: normal respiratory effort  Skin: no rashes or wounds; normal turgor  Psych: mood and affect appropriate  Lymph: no nodes appreciated  Neuro: gross sensation intact  Vascular:  Palpable peripheral pulse in noted extremity  Musculoskeletal Extremities:   Left knee-active range of motion 0 to 125 degrees, 4+5 strength on flexion extension, moderate effusion, maximal tenderness palpation medial joint line, positive active patellar compression test.  Stable to varus and valgus stress at 0 and 30 degrees.    Debilities/Disabilities Identified: None      Diagnostic Tests:  Pre-Admission Testing on 07/15/2024   Component Date Value Ref Range Status    PTT 07/15/2024 29.4  24.3 - 38.1 seconds Final    Protime 07/15/2024 13.5  12.1 - 15.0  Seconds Final    INR 07/15/2024 1.03  0.90 - 1.10 Final    Glucose 07/15/2024 107 (H)  65 - 99 mg/dL Final    BUN 07/15/2024 22  8 - 23 mg/dL Final    Creatinine 07/15/2024 1.23  0.76 - 1.27 mg/dL Final    Sodium 07/15/2024 138  136 - 145 mmol/L Final    Potassium 07/15/2024 3.7  3.5 - 5.2 mmol/L Final    Chloride 07/15/2024 103  98 - 107 mmol/L Final    CO2 07/15/2024 21.6 (L)  22.0 - 29.0 mmol/L Final    Calcium 07/15/2024 9.2  8.6 - 10.5 mg/dL Final    BUN/Creatinine Ratio 07/15/2024 17.9  7.0 - 25.0 Final    Anion Gap 07/15/2024 13.4  5.0 - 15.0 mmol/L Final    eGFR 07/15/2024 63.2  >60.0 mL/min/1.73 Final    MRSA Screen Cx 07/15/2024 No Methicillin Resistant Staphylococcus aureus isolated   Final    ABO Type 07/15/2024 O   Final    RH type 07/15/2024 Positive   Final    Antibody Screen 07/15/2024 Negative   Final    T&S Expiration Date 07/15/2024 7/29/2024 11:59:00 PM   Final    WBC 07/15/2024 6.66  3.40 - 10.80 10*3/mm3 Final    RBC 07/15/2024 4.69  4.14 - 5.80 10*6/mm3 Final    Hemoglobin 07/15/2024 13.8  13.0 - 17.7 g/dL Final    Hematocrit 07/15/2024 41.7  37.5 - 51.0 % Final    MCV 07/15/2024 88.9  79.0 - 97.0 fL Final    MCH 07/15/2024 29.4  26.6 - 33.0 pg Final    MCHC 07/15/2024 33.1  31.5 - 35.7 g/dL Final    RDW 07/15/2024 13.0  12.3 - 15.4 % Final    RDW-SD 07/15/2024 43.1  37.0 - 54.0 fl Final    MPV 07/15/2024 10.8  6.0 - 12.0 fL Final    Platelets 07/15/2024 203  140 - 450 10*3/mm3 Final    Neutrophil % 07/15/2024 61.6  42.7 - 76.0 % Final    Lymphocyte % 07/15/2024 26.9  19.6 - 45.3 % Final    Monocyte % 07/15/2024 8.4  5.0 - 12.0 % Final    Eosinophil % 07/15/2024 2.6  0.3 - 6.2 % Final    Basophil % 07/15/2024 0.3  0.0 - 1.5 % Final    Immature Grans % 07/15/2024 0.2  0.0 - 0.5 % Final    Neutrophils, Absolute 07/15/2024 4.11  1.70 - 7.00 10*3/mm3 Final    Lymphocytes, Absolute 07/15/2024 1.79  0.70 - 3.10 10*3/mm3 Final    Monocytes, Absolute 07/15/2024 0.56  0.10 - 0.90 10*3/mm3 Final     Eosinophils, Absolute 07/15/2024 0.17  0.00 - 0.40 10*3/mm3 Final    Basophils, Absolute 07/15/2024 0.02  0.00 - 0.20 10*3/mm3 Final    Immature Grans, Absolute 07/15/2024 0.01  0.00 - 0.05 10*3/mm3 Final    ABO Type 07/15/2024 O   Final    RH type 07/15/2024 Positive   Final     No results found.    Assessment:  Left knee pain, DJD    Plan:  I reviewed anatomy of a total joint arthroplasty in laymen's terms, as well as typical postoperative recovery and possibly 6-12 months for maximal recovery, and possible need for rehabilitation stay after hospitalization. We also discussed risks, benefits, alternatives, and limitations of procedure with risks including but not limited to neurovascular damage, bleeding, infection, malalignment, chronic pain, failure of implants, osteolysis, loosening of implants, loss of motion, weakness, stiffness, instability, DVT, pulmonary embolus, death, stroke, complex regional pain syndrome, myocardial infarction, and need for additional procedures. No guarantees were given regarding results of surgery.      Nabor Muhammad Dr. was given the opportunity to ask and have all questions answered today.  The patient voiced understanding of the risks, benefits, and alternative forms of treatment that were discussed and the patient consents to proceed with surgery.     Patient's blood clot history is negative.    Plan for DVT prophylaxis is ASA    Patient's MRSA infection history is negative.    Patient's skin infection history is negative.    Discharge Plan: POD 1-2 to home    Date: 7/23/2024    Dictated utilizing Dragon dictation

## 2024-07-22 NOTE — PROGRESS NOTES
" History & Physical       Patient: Nabor Muhammad Dr.    YOB: 1955    Medical Record Number: [id number]    Surgeon:  Dr. Terry Zarate    Chief Complaints:   Chief Complaint   Patient presents with    Left Knee - Follow-up     Pre-op       Subjective:  This problem is not new to this examiner.     History of Present Illness: 68 y.o. male presents with   Chief Complaint   Patient presents with    Left Knee - Follow-up     Pre-op   Onset of symptoms was years ago and has been progressively worsening despite more conservative treatment measures.  Symptoms are associated with ability to move, exercise, and perform activities of daily living.  Symptoms are aggravated by weight bearing and ROM necessary for activities of daily living.   Symptoms improve with rest, ice and elevation only minimally.      Allergies:   Allergies   Allergen Reactions    Codeine GI Intolerance       Medications:   Home Medications:  Current Outpatient Medications on File Prior to Visit   Medication Sig    finasteride (PROSCAR) 5 MG tablet Take 1 tablet by mouth Daily.    naloxone (NARCAN) 4 MG/0.1ML nasal spray Call 911. Don't prime. [place] in 1 nostril for overdose. Repeat in 2-3 minutes in other nostril if no or minimal breathing/responsiveness.    olmesartan-hydrochlorothiazide (BENICAR HCT) 40-25 MG per tablet Take 1 tablet by mouth Daily.    omeprazole (priLOSEC) 20 MG capsule Take 1 capsule by mouth Daily.    ondansetron (Zofran) 4 MG tablet Take 1 tablet by mouth Every 8 (Eight) Hours As Needed for Nausea or Vomiting.    tamsulosin (FLOMAX) 0.4 MG capsule 24 hr capsule Take 2 capsules by mouth Daily.    vitamin D3 125 MCG (5000 UT) capsule capsule Take 1 capsule by mouth Daily.     Current Facility-Administered Medications on File Prior to Visit   Medication    bupivacaine (MARCAINE) 0.125% in 0.9% sodium chloride 400 mL elastomeric pump \"pain ball\"     Current Medications:  Scheduled Meds:  Continuous Infusions:No " current facility-administered medications for this visit.    PRN Meds:.    I have reviewed the patient's medical history in detail and updated the computerized patient record.  Review and summarization of old records include:    Past Medical History:   Diagnosis Date    Arthritis     GERD (gastroesophageal reflux disease)     Hypertension     Lumbosacral disc disease     OA (osteoarthritis)     YANETH (obstructive sleep apnea)     CPAP    Spinal stenosis of lumbar region         Past Surgical History:   Procedure Laterality Date    CHOLECYSTECTOMY      FEMUR CLOSED REDUCTION Left 1970    KNEE ARTHROSCOPY Left 3/5/2024    Procedure: LEFT KNEE ARTHROSCOPY WITH OPEN ARTHROTOMY SUBCHONDROPLASTY, partial medial and lateral meniscectomy;  Surgeon: Terry Zarate MD;  Location: [facility] OR;  Service: Orthopedics;  Laterality: Left;    KNEE MENISCECTOMY Right 7/21/2021    Procedure: KNEE PARTIAL MEDIAL MENISCECTOMY,;  Surgeon: Terry Zarate MD;  Location: [facility] OR;  Service: Orthopedics;  Laterality: Right;    SHOULDER SURGERY Right 1998    TONSILLECTOMY AND ADENOIDECTOMY      WRIST GANGLION EXCISION Bilateral     R-1990 L-1996        Social History     Occupational History    Not on file   Tobacco Use    Smoking status: Never     Passive exposure: Never    Smokeless tobacco: Never   Vaping Use    Vaping status: Never Used   Substance and Sexual Activity    Alcohol use: Yes     Comment: occ    Drug use: No    Sexual activity: Defer      Social History     Social History Narrative    Not on file        Family History   Problem Relation Age of Onset    Breast cancer Mother     COPD Father     Malig Hyperthermia Neg Hx        ROS: 14 point review of systems was performed and was negative except for documented findings in HPI and today's encounter.     Allergies:   Allergies   Allergen Reactions    Codeine GI Intolerance     Constitutional:  Denies fever, shaking or chills   Eyes:  Denies change in visual acuity   HENT:   Denies nasal congestion or sore throat   Respiratory:  Denies cough or shortness of breath   Cardiovascular:  Denies chest pain or severe LE edema   GI:  Denies abdominal pain, nausea, vomiting, bloody stools or diarrhea   Musculoskeletal:  Denies numbness tingling or loss of motor function except as outlined above in history of present illness.  : Denies painful urination or hematuria  Integument:  Denies rash, lesion or ulceration   Neurologic:  Denies headache or focal weakness  Endocrine:  Denies lymphadenopathy  Psych:  Denies confusion or change in mental status   Hem:  Denies active bleeding    Physical Exam: 68 y.o. male  Body mass index is 38.35 kg/m².  There were no vitals filed for this visit.  Vital signs reviewed.   General Appearance:    Alert, cooperative, in no acute distress                  Eyes: conjunctivae clear  ENT: external ears and nose atraumatic  CV: no peripheral edema  Resp: normal respiratory effort  Skin: no rashes or wounds; normal turgor  Psych: mood and affect appropriate  Lymph: no nodes appreciated  Neuro: gross sensation intact  Vascular:  Palpable peripheral pulse in noted extremity  Musculoskeletal Extremities:   Left knee-active range of motion 0 to 125 degrees, 4+5 strength on flexion extension, moderate effusion, maximal tenderness palpation medial joint line, positive active patellar compression test.  Stable to varus and valgus stress at 0 and 30 degrees.    Debilities/Disabilities Identified: None      Diagnostic Tests:  Pre-Admission Testing on 07/15/2024   Component Date Value Ref Range Status    PTT 07/15/2024 29.4  24.3 - 38.1 seconds Final    Protime 07/15/2024 13.5  12.1 - 15.0 Seconds Final    INR 07/15/2024 1.03  0.90 - 1.10 Final    Glucose 07/15/2024 107 (H)  65 - 99 mg/dL Final    BUN 07/15/2024 22  8 - 23 mg/dL Final    Creatinine 07/15/2024 1.23  0.76 - 1.27 mg/dL Final    Sodium 07/15/2024 138  136 - 145 mmol/L Final    Potassium 07/15/2024 3.7  3.5 - 5.2  mmol/L Final    Chloride 07/15/2024 103  98 - 107 mmol/L Final    CO2 07/15/2024 21.6 (L)  22.0 - 29.0 mmol/L Final    Calcium 07/15/2024 9.2  8.6 - 10.5 mg/dL Final    BUN/Creatinine Ratio 07/15/2024 17.9  7.0 - 25.0 Final    Anion Gap 07/15/2024 13.4  5.0 - 15.0 mmol/L Final    eGFR 07/15/2024 63.2  >60.0 mL/min/1.73 Final    MRSA Screen Cx 07/15/2024 No Methicillin Resistant Staphylococcus aureus isolated   Final    ABO Type 07/15/2024 O   Final    RH type 07/15/2024 Positive   Final    Antibody Screen 07/15/2024 Negative   Final    T&S Expiration Date 07/15/2024 7/29/2024 11:59:00 PM   Final    WBC 07/15/2024 6.66  3.40 - 10.80 10*3/mm3 Final    RBC 07/15/2024 4.69  4.14 - 5.80 10*6/mm3 Final    Hemoglobin 07/15/2024 13.8  13.0 - 17.7 g/dL Final    Hematocrit 07/15/2024 41.7  37.5 - 51.0 % Final    MCV 07/15/2024 88.9  79.0 - 97.0 fL Final    MCH 07/15/2024 29.4  26.6 - 33.0 pg Final    MCHC 07/15/2024 33.1  31.5 - 35.7 g/dL Final    RDW 07/15/2024 13.0  12.3 - 15.4 % Final    RDW-SD 07/15/2024 43.1  37.0 - 54.0 fl Final    MPV 07/15/2024 10.8  6.0 - 12.0 fL Final    Platelets 07/15/2024 203  140 - 450 10*3/mm3 Final    Neutrophil % 07/15/2024 61.6  42.7 - 76.0 % Final    Lymphocyte % 07/15/2024 26.9  19.6 - 45.3 % Final    Monocyte % 07/15/2024 8.4  5.0 - 12.0 % Final    Eosinophil % 07/15/2024 2.6  0.3 - 6.2 % Final    Basophil % 07/15/2024 0.3  0.0 - 1.5 % Final    Immature Grans % 07/15/2024 0.2  0.0 - 0.5 % Final    Neutrophils, Absolute 07/15/2024 4.11  1.70 - 7.00 10*3/mm3 Final    Lymphocytes, Absolute 07/15/2024 1.79  0.70 - 3.10 10*3/mm3 Final    Monocytes, Absolute 07/15/2024 0.56  0.10 - 0.90 10*3/mm3 Final    Eosinophils, Absolute 07/15/2024 0.17  0.00 - 0.40 10*3/mm3 Final    Basophils, Absolute 07/15/2024 0.02  0.00 - 0.20 10*3/mm3 Final    Immature Grans, Absolute 07/15/2024 0.01  0.00 - 0.05 10*3/mm3 Final    ABO Type 07/15/2024 O   Final    RH type 07/15/2024 Positive   Final     No results  found.    Assessment:  Left knee pain, DJD    Plan:  I reviewed anatomy of a total joint arthroplasty in laymen's terms, as well as typical postoperative recovery and possibly 6-12 months for maximal recovery, and possible need for rehabilitation stay after hospitalization. We also discussed risks, benefits, alternatives, and limitations of procedure with risks including but not limited to neurovascular damage, bleeding, infection, malalignment, chronic pain, failure of implants, osteolysis, loosening of implants, loss of motion, weakness, stiffness, instability, DVT, pulmonary embolus, death, stroke, complex regional pain syndrome, myocardial infarction, and need for additional procedures. No guarantees were given regarding results of surgery.      Nabor Muhammad Dr. was given the opportunity to ask and have all questions answered today.  The patient voiced understanding of the risks, benefits, and alternative forms of treatment that were discussed and the patient consents to proceed with surgery.     Patient's blood clot history is negative.    Plan for DVT prophylaxis is ASA    Patient's MRSA infection history is negative.    Patient's skin infection history is negative.    Discharge Plan: POD 1-2 to home    Date: 7/22/2024    Dictated utilizing Dragon dictation

## 2024-07-22 NOTE — PROGRESS NOTES
Cc: f/u left knee pain, DJD    Patient presented to clinic today for preoperative history and physical visit in anticipation of upcoming scheduled left total knee arthroplasty.    I reviewed anatomy and a model of a total knee arthroplasty, as well as typical postoperative recovery of 6-12 months before maximal recovery, and possible need for rehabilitation stay after hospitalization. We also discussed risks, benefits, and alternatives of procedure with risks including but not limited to neurovascular damage, bleeding, infection, malalignment, chronic pian, failure of implants, osteolysis, loosening of implants, loss of motion, weakness, stiffness, instability, DVT, pulmonary embolus, death, stroke, complex regional pain syndrome, myocardial infarction, and need for additional procedures. Patient understood all these and had all questions answered before consenting to the procedure. No guarantees were given in regards to results from the surgery.  Patient has been medically optimize by his primary care physician.    Postoperative DVT prophylaxis will be with aspirin     All remaining questions answered today.

## 2024-07-23 ENCOUNTER — HOSPITAL ENCOUNTER (OUTPATIENT)
Facility: HOSPITAL | Age: 69
Discharge: HOME | End: 2024-07-24
Attending: ORTHOPAEDIC SURGERY | Admitting: ORTHOPAEDIC SURGERY
Payer: COMMERCIAL

## 2024-07-23 ENCOUNTER — APPOINTMENT (OUTPATIENT)
Dept: GENERAL RADIOLOGY | Facility: HOSPITAL | Age: 69
End: 2024-07-23
Payer: COMMERCIAL

## 2024-07-23 ENCOUNTER — ANESTHESIA (OUTPATIENT)
Dept: PERIOP | Facility: HOSPITAL | Age: 69
End: 2024-07-23
Payer: COMMERCIAL

## 2024-07-23 DIAGNOSIS — Z96.652 STATUS POST TOTAL LEFT KNEE REPLACEMENT: Primary | ICD-10-CM

## 2024-07-23 DIAGNOSIS — M17.12 PRIMARY OSTEOARTHRITIS OF LEFT KNEE: ICD-10-CM

## 2024-07-23 PROCEDURE — 25810000003 SODIUM CHLORIDE 0.9 % SOLUTION: Performed by: NURSE ANESTHETIST, CERTIFIED REGISTERED

## 2024-07-23 PROCEDURE — 25010000002 BUPIVACAINE 0.5 % SOLUTION

## 2024-07-23 PROCEDURE — 25810000003 LACTATED RINGERS PER 1000 ML: Performed by: NURSE ANESTHETIST, CERTIFIED REGISTERED

## 2024-07-23 PROCEDURE — C1776 JOINT DEVICE (IMPLANTABLE): HCPCS | Performed by: ORTHOPAEDIC SURGERY

## 2024-07-23 PROCEDURE — 25010000002 MIDAZOLAM PER 1MG: Performed by: NURSE ANESTHETIST, CERTIFIED REGISTERED

## 2024-07-23 PROCEDURE — 25810000003 SODIUM CHLORIDE 0.9 % SOLUTION: Performed by: ORTHOPAEDIC SURGERY

## 2024-07-23 PROCEDURE — 25010000002 DEXAMETHASONE PER 1 MG: Performed by: NURSE ANESTHETIST, CERTIFIED REGISTERED

## 2024-07-23 PROCEDURE — 25010000002 VANCOMYCIN 1 G RECONSTITUTED SOLUTION: Performed by: ORTHOPAEDIC SURGERY

## 2024-07-23 PROCEDURE — C1713 ANCHOR/SCREW BN/BN,TIS/BN: HCPCS | Performed by: ORTHOPAEDIC SURGERY

## 2024-07-23 PROCEDURE — G0378 HOSPITAL OBSERVATION PER HR: HCPCS

## 2024-07-23 PROCEDURE — 25010000002 BUPIVACAINE (PF) 0.25 % SOLUTION

## 2024-07-23 PROCEDURE — 25010000002 BUPIVACAINE (PF) 0.5 % SOLUTION: Performed by: ORTHOPAEDIC SURGERY

## 2024-07-23 PROCEDURE — 25010000002 BUPIVACAINE (PF) 0.5 % SOLUTION: Performed by: NURSE ANESTHETIST, CERTIFIED REGISTERED

## 2024-07-23 PROCEDURE — 25010000002 VANCOMYCIN 750 MG RECONSTITUTED SOLUTION 1 EACH VIAL: Performed by: ORTHOPAEDIC SURGERY

## 2024-07-23 PROCEDURE — 25810000003 LACTATED RINGERS PER 1000 ML

## 2024-07-23 PROCEDURE — 73560 X-RAY EXAM OF KNEE 1 OR 2: CPT

## 2024-07-23 PROCEDURE — 97165 OT EVAL LOW COMPLEX 30 MIN: CPT

## 2024-07-23 PROCEDURE — 97161 PT EVAL LOW COMPLEX 20 MIN: CPT

## 2024-07-23 PROCEDURE — 25010000002 ONDANSETRON PER 1 MG: Performed by: NURSE ANESTHETIST, CERTIFIED REGISTERED

## 2024-07-23 PROCEDURE — 27447 TOTAL KNEE ARTHROPLASTY: CPT | Performed by: ORTHOPAEDIC SURGERY

## 2024-07-23 PROCEDURE — 94761 N-INVAS EAR/PLS OXIMETRY MLT: CPT

## 2024-07-23 PROCEDURE — 25010000002 PROPOFOL 1000 MG/100ML EMULSION

## 2024-07-23 PROCEDURE — L1830 KO IMMOB CANVAS LONG PRE OTS: HCPCS | Performed by: ORTHOPAEDIC SURGERY

## 2024-07-23 PROCEDURE — 27447 TOTAL KNEE ARTHROPLASTY: CPT | Performed by: SPECIALIST/TECHNOLOGIST, OTHER

## 2024-07-23 PROCEDURE — 20985 CPTR-ASST DIR MS PX: CPT | Performed by: ORTHOPAEDIC SURGERY

## 2024-07-23 PROCEDURE — 25010000002 PHENYLEPHRINE 10 MG/ML SOLUTION

## 2024-07-23 PROCEDURE — 25810000003 SODIUM CHLORIDE 0.9 % SOLUTION 500 ML FLEX CONT: Performed by: ORTHOPAEDIC SURGERY

## 2024-07-23 PROCEDURE — 25010000002 VANCOMYCIN 1 G RECONSTITUTED SOLUTION 1 EACH VIAL: Performed by: ORTHOPAEDIC SURGERY

## 2024-07-23 PROCEDURE — 25010000002 CEFAZOLIN 3 G RECONSTITUTED SOLUTION 1 EACH VIAL: Performed by: ORTHOPAEDIC SURGERY

## 2024-07-23 PROCEDURE — 94799 UNLISTED PULMONARY SVC/PX: CPT

## 2024-07-23 DEVICE — IMPLANTABLE DEVICE
Type: IMPLANTABLE DEVICE | Site: KNEE | Status: FUNCTIONAL
Brand: PERSONA® VIVACIT-E®

## 2024-07-23 DEVICE — VIOLET ANTIBACTERIAL POLYDIOXANONE, KNOTLESS TISSUE CONTROL DEVICE
Type: IMPLANTABLE DEVICE | Site: KNEE | Status: FUNCTIONAL
Brand: STRATAFIX

## 2024-07-23 DEVICE — CAP TOTL KN CMT PRIMARY W/ROSA: Type: IMPLANTABLE DEVICE | Site: KNEE | Status: FUNCTIONAL

## 2024-07-23 DEVICE — IMPLANTABLE DEVICE
Type: IMPLANTABLE DEVICE | Site: KNEE | Status: FUNCTIONAL
Brand: PERSONA® NATURAL TIBIA®

## 2024-07-23 DEVICE — IMPLANTABLE DEVICE
Type: IMPLANTABLE DEVICE | Site: KNEE | Status: FUNCTIONAL
Brand: PERSONA®

## 2024-07-23 DEVICE — IMPLANTABLE DEVICE
Type: IMPLANTABLE DEVICE | Site: KNEE | Status: FUNCTIONAL
Brand: REFOBACIN® BONE CEMENT R

## 2024-07-23 DEVICE — IMPLANTABLE DEVICE
Type: IMPLANTABLE DEVICE | Site: KNEE | Status: FUNCTIONAL
Brand: PERSONA™

## 2024-07-23 DEVICE — KNOTLESS TISSUE CONTROL DEVICE, UNDYED UNIDIRECTIONAL (ANTIBACTERIAL) SYNTHETIC ABSORBABLE DEVICE
Type: IMPLANTABLE DEVICE | Site: KNEE | Status: FUNCTIONAL
Brand: STRATAFIX

## 2024-07-23 DEVICE — CAP EXT STEM KN UPCHRG: Type: IMPLANTABLE DEVICE | Site: KNEE | Status: FUNCTIONAL

## 2024-07-23 RX ORDER — DEXMEDETOMIDINE HYDROCHLORIDE 100 UG/ML
INJECTION, SOLUTION INTRAVENOUS
Status: COMPLETED | OUTPATIENT
Start: 2024-07-23 | End: 2024-07-23

## 2024-07-23 RX ORDER — ONDANSETRON 2 MG/ML
4 INJECTION INTRAMUSCULAR; INTRAVENOUS ONCE AS NEEDED
Status: COMPLETED | OUTPATIENT
Start: 2024-07-23 | End: 2024-07-23

## 2024-07-23 RX ORDER — MELOXICAM 7.5 MG/1
15 TABLET ORAL ONCE
Status: COMPLETED | OUTPATIENT
Start: 2024-07-23 | End: 2024-07-23

## 2024-07-23 RX ORDER — MAGNESIUM HYDROXIDE 1200 MG/15ML
LIQUID ORAL AS NEEDED
Status: DISCONTINUED | OUTPATIENT
Start: 2024-07-23 | End: 2024-07-23 | Stop reason: HOSPADM

## 2024-07-23 RX ORDER — PREGABALIN 75 MG/1
75 CAPSULE ORAL EVERY 12 HOURS SCHEDULED
Status: DISCONTINUED | OUTPATIENT
Start: 2024-07-23 | End: 2024-07-24 | Stop reason: HOSPADM

## 2024-07-23 RX ORDER — OXYCODONE HYDROCHLORIDE 5 MG/1
5 TABLET ORAL EVERY 4 HOURS PRN
Status: DISCONTINUED | OUTPATIENT
Start: 2024-07-23 | End: 2024-07-24 | Stop reason: HOSPADM

## 2024-07-23 RX ORDER — SODIUM CHLORIDE 0.9 % (FLUSH) 0.9 %
10 SYRINGE (ML) INJECTION EVERY 12 HOURS SCHEDULED
Status: DISCONTINUED | OUTPATIENT
Start: 2024-07-23 | End: 2024-07-23 | Stop reason: HOSPADM

## 2024-07-23 RX ORDER — TAMSULOSIN HYDROCHLORIDE 0.4 MG/1
0.8 CAPSULE ORAL DAILY
Status: DISCONTINUED | OUTPATIENT
Start: 2024-07-24 | End: 2024-07-24 | Stop reason: HOSPADM

## 2024-07-23 RX ORDER — SODIUM CHLORIDE, SODIUM LACTATE, POTASSIUM CHLORIDE, CALCIUM CHLORIDE 600; 310; 30; 20 MG/100ML; MG/100ML; MG/100ML; MG/100ML
9 INJECTION, SOLUTION INTRAVENOUS CONTINUOUS PRN
Status: DISCONTINUED | OUTPATIENT
Start: 2024-07-23 | End: 2024-07-23 | Stop reason: HOSPADM

## 2024-07-23 RX ORDER — MELOXICAM 7.5 MG/1
15 TABLET ORAL DAILY
Status: DISCONTINUED | OUTPATIENT
Start: 2024-07-24 | End: 2024-07-24 | Stop reason: HOSPADM

## 2024-07-23 RX ORDER — VANCOMYCIN HYDROCHLORIDE 1 G/20ML
INJECTION, POWDER, LYOPHILIZED, FOR SOLUTION INTRAVENOUS AS NEEDED
Status: DISCONTINUED | OUTPATIENT
Start: 2024-07-23 | End: 2024-07-23 | Stop reason: HOSPADM

## 2024-07-23 RX ORDER — PANTOPRAZOLE SODIUM 40 MG/1
40 TABLET, DELAYED RELEASE ORAL
Status: DISCONTINUED | OUTPATIENT
Start: 2024-07-24 | End: 2024-07-24 | Stop reason: HOSPADM

## 2024-07-23 RX ORDER — EPHEDRINE SULFATE 50 MG/ML
INJECTION INTRAVENOUS AS NEEDED
Status: DISCONTINUED | OUTPATIENT
Start: 2024-07-23 | End: 2024-07-23 | Stop reason: SURG

## 2024-07-23 RX ORDER — TRANEXAMIC ACID 10 MG/ML
1000 INJECTION, SOLUTION INTRAVENOUS ONCE
Status: COMPLETED | OUTPATIENT
Start: 2024-07-23 | End: 2024-07-23

## 2024-07-23 RX ORDER — ONDANSETRON 2 MG/ML
4 INJECTION INTRAMUSCULAR; INTRAVENOUS EVERY 6 HOURS PRN
Status: DISCONTINUED | OUTPATIENT
Start: 2024-07-23 | End: 2024-07-24 | Stop reason: HOSPADM

## 2024-07-23 RX ORDER — BUPIVACAINE HYDROCHLORIDE 5 MG/ML
INJECTION, SOLUTION PERINEURAL
Status: COMPLETED | OUTPATIENT
Start: 2024-07-23 | End: 2024-07-23

## 2024-07-23 RX ORDER — PHENYLEPHRINE HYDROCHLORIDE 10 MG/ML
INJECTION INTRAVENOUS AS NEEDED
Status: DISCONTINUED | OUTPATIENT
Start: 2024-07-23 | End: 2024-07-23 | Stop reason: SURG

## 2024-07-23 RX ORDER — BUPIVACAINE HYDROCHLORIDE 2.5 MG/ML
INJECTION, SOLUTION EPIDURAL; INFILTRATION; INTRACAUDAL
Status: COMPLETED | OUTPATIENT
Start: 2024-07-23 | End: 2024-07-23

## 2024-07-23 RX ORDER — SODIUM CHLORIDE 0.9 % (FLUSH) 0.9 %
10 SYRINGE (ML) INJECTION AS NEEDED
Status: DISCONTINUED | OUTPATIENT
Start: 2024-07-23 | End: 2024-07-23 | Stop reason: HOSPADM

## 2024-07-23 RX ORDER — MIDAZOLAM HYDROCHLORIDE 2 MG/2ML
0.5 INJECTION, SOLUTION INTRAMUSCULAR; INTRAVENOUS
Status: DISCONTINUED | OUTPATIENT
Start: 2024-07-23 | End: 2024-07-23 | Stop reason: HOSPADM

## 2024-07-23 RX ORDER — SODIUM CHLORIDE 9 MG/ML
40 INJECTION, SOLUTION INTRAVENOUS AS NEEDED
Status: DISCONTINUED | OUTPATIENT
Start: 2024-07-23 | End: 2024-07-23 | Stop reason: HOSPADM

## 2024-07-23 RX ORDER — SODIUM CHLORIDE, SODIUM LACTATE, POTASSIUM CHLORIDE, CALCIUM CHLORIDE 600; 310; 30; 20 MG/100ML; MG/100ML; MG/100ML; MG/100ML
100 INJECTION, SOLUTION INTRAVENOUS ONCE
Status: DISCONTINUED | OUTPATIENT
Start: 2024-07-23 | End: 2024-07-23 | Stop reason: HOSPADM

## 2024-07-23 RX ORDER — ACETAMINOPHEN 500 MG
1000 TABLET ORAL 3 TIMES DAILY
Status: DISCONTINUED | OUTPATIENT
Start: 2024-07-23 | End: 2024-07-24 | Stop reason: HOSPADM

## 2024-07-23 RX ORDER — TADALAFIL 5 MG/1
5 TABLET ORAL DAILY
COMMUNITY

## 2024-07-23 RX ORDER — LIDOCAINE HYDROCHLORIDE 20 MG/ML
INJECTION, SOLUTION INFILTRATION; PERINEURAL AS NEEDED
Status: DISCONTINUED | OUTPATIENT
Start: 2024-07-23 | End: 2024-07-23 | Stop reason: SURG

## 2024-07-23 RX ORDER — OXYCODONE HYDROCHLORIDE 5 MG/1
10 TABLET ORAL EVERY 4 HOURS PRN
Status: DISCONTINUED | OUTPATIENT
Start: 2024-07-23 | End: 2024-07-24 | Stop reason: HOSPADM

## 2024-07-23 RX ORDER — ONDANSETRON 4 MG/1
4 TABLET, ORALLY DISINTEGRATING ORAL EVERY 6 HOURS PRN
Status: DISCONTINUED | OUTPATIENT
Start: 2024-07-23 | End: 2024-07-24 | Stop reason: HOSPADM

## 2024-07-23 RX ORDER — DEXAMETHASONE SODIUM PHOSPHATE 4 MG/ML
4 INJECTION, SOLUTION INTRA-ARTICULAR; INTRALESIONAL; INTRAMUSCULAR; INTRAVENOUS; SOFT TISSUE ONCE AS NEEDED
Status: COMPLETED | OUTPATIENT
Start: 2024-07-23 | End: 2024-07-23

## 2024-07-23 RX ORDER — FINASTERIDE 5 MG/1
5 TABLET, FILM COATED ORAL DAILY
Status: DISCONTINUED | OUTPATIENT
Start: 2024-07-24 | End: 2024-07-24 | Stop reason: HOSPADM

## 2024-07-23 RX ORDER — OXYCODONE HYDROCHLORIDE AND ACETAMINOPHEN 5; 325 MG/1; MG/1
1 TABLET ORAL ONCE AS NEEDED
Status: DISCONTINUED | OUTPATIENT
Start: 2024-07-23 | End: 2024-07-23 | Stop reason: HOSPADM

## 2024-07-23 RX ORDER — NALOXONE HCL 0.4 MG/ML
0.4 VIAL (ML) INJECTION
Status: DISCONTINUED | OUTPATIENT
Start: 2024-07-23 | End: 2024-07-24 | Stop reason: HOSPADM

## 2024-07-23 RX ORDER — ASPIRIN 81 MG/1
81 TABLET ORAL EVERY 12 HOURS SCHEDULED
Status: DISCONTINUED | OUTPATIENT
Start: 2024-07-24 | End: 2024-07-24 | Stop reason: HOSPADM

## 2024-07-23 RX ORDER — FAMOTIDINE 10 MG/ML
20 INJECTION, SOLUTION INTRAVENOUS
Status: COMPLETED | OUTPATIENT
Start: 2024-07-23 | End: 2024-07-23

## 2024-07-23 RX ORDER — PROPOFOL 10 MG/ML
INJECTION, EMULSION INTRAVENOUS AS NEEDED
Status: DISCONTINUED | OUTPATIENT
Start: 2024-07-23 | End: 2024-07-23 | Stop reason: SURG

## 2024-07-23 RX ORDER — ACETAMINOPHEN 500 MG
1000 TABLET ORAL ONCE
Status: COMPLETED | OUTPATIENT
Start: 2024-07-23 | End: 2024-07-23

## 2024-07-23 RX ORDER — ONDANSETRON 2 MG/ML
4 INJECTION INTRAMUSCULAR; INTRAVENOUS ONCE AS NEEDED
Status: DISCONTINUED | OUTPATIENT
Start: 2024-07-23 | End: 2024-07-23 | Stop reason: HOSPADM

## 2024-07-23 RX ORDER — TRANEXAMIC ACID 10 MG/ML
1000 INJECTION, SOLUTION INTRAVENOUS ONCE
Status: DISCONTINUED | OUTPATIENT
Start: 2024-07-23 | End: 2024-07-23 | Stop reason: HOSPADM

## 2024-07-23 RX ORDER — PREGABALIN 75 MG/1
150 CAPSULE ORAL ONCE
Status: COMPLETED | OUTPATIENT
Start: 2024-07-23 | End: 2024-07-23

## 2024-07-23 RX ADMIN — LIDOCAINE HYDROCHLORIDE 100 MG: 20 INJECTION, SOLUTION INFILTRATION; PERINEURAL at 09:13

## 2024-07-23 RX ADMIN — PROPOFOL INJECTABLE EMULSION 50 MG: 10 INJECTION, EMULSION INTRAVENOUS at 09:13

## 2024-07-23 RX ADMIN — OXYCODONE HYDROCHLORIDE 10 MG: 5 TABLET ORAL at 22:55

## 2024-07-23 RX ADMIN — SODIUM CHLORIDE, POTASSIUM CHLORIDE, SODIUM LACTATE AND CALCIUM CHLORIDE 9 ML/HR: 600; 310; 30; 20 INJECTION, SOLUTION INTRAVENOUS at 07:43

## 2024-07-23 RX ADMIN — BUPIVACAINE HYDROCHLORIDE 15 ML: 2.5 INJECTION, SOLUTION EPIDURAL; INFILTRATION; INTRACAUDAL at 08:36

## 2024-07-23 RX ADMIN — BUPIVACAINE HYDROCHLORIDE 20 ML: 2.5 INJECTION, SOLUTION EPIDURAL; INFILTRATION; INTRACAUDAL at 08:31

## 2024-07-23 RX ADMIN — MIDAZOLAM HYDROCHLORIDE 0.5 MG: 1 INJECTION, SOLUTION INTRAMUSCULAR; INTRAVENOUS at 08:12

## 2024-07-23 RX ADMIN — SODIUM CHLORIDE 3000 MG: 9 INJECTION, SOLUTION INTRAVENOUS at 09:18

## 2024-07-23 RX ADMIN — MELOXICAM 15 MG: 7.5 TABLET ORAL at 07:58

## 2024-07-23 RX ADMIN — EPHEDRINE SULFATE 10 MG: 50 INJECTION INTRAVENOUS at 09:27

## 2024-07-23 RX ADMIN — PHENYLEPHRINE HYDROCHLORIDE 100 MCG: 10 INJECTION INTRAVENOUS at 10:16

## 2024-07-23 RX ADMIN — PHENYLEPHRINE HYDROCHLORIDE 100 MCG: 10 INJECTION INTRAVENOUS at 11:35

## 2024-07-23 RX ADMIN — BUPIVACAINE HYDROCHLORIDE 2.2 ML: 5 INJECTION, SOLUTION PERINEURAL at 08:52

## 2024-07-23 RX ADMIN — BUPIVACAINE HYDROCHLORIDE 10 ML: 2.5 INJECTION, SOLUTION EPIDURAL; INFILTRATION; INTRACAUDAL; PERINEURAL at 08:22

## 2024-07-23 RX ADMIN — DEXMEDETOMIDINE 30 MCG: 100 INJECTION, SOLUTION, CONCENTRATE INTRAVENOUS at 08:22

## 2024-07-23 RX ADMIN — PHENYLEPHRINE HYDROCHLORIDE 100 MCG: 10 INJECTION INTRAVENOUS at 11:29

## 2024-07-23 RX ADMIN — PREGABALIN 150 MG: 75 CAPSULE ORAL at 07:58

## 2024-07-23 RX ADMIN — PREGABALIN 75 MG: 75 CAPSULE ORAL at 20:14

## 2024-07-23 RX ADMIN — PROPOFOL INJECTABLE EMULSION 100 MCG/KG/MIN: 10 INJECTION, EMULSION INTRAVENOUS at 09:14

## 2024-07-23 RX ADMIN — ACETAMINOPHEN 1000 MG: 500 TABLET ORAL at 15:48

## 2024-07-23 RX ADMIN — BUPIVACAINE HYDROCHLORIDE 8 ML/HR: 5 INJECTION, SOLUTION EPIDURAL; INTRACAUDAL; PERINEURAL at 12:32

## 2024-07-23 RX ADMIN — PROPOFOL INJECTABLE EMULSION 50 MG: 10 INJECTION, EMULSION INTRAVENOUS at 10:25

## 2024-07-23 RX ADMIN — EPHEDRINE SULFATE 20 MG: 50 INJECTION INTRAVENOUS at 09:24

## 2024-07-23 RX ADMIN — DEXAMETHASONE SODIUM PHOSPHATE 4 MG: 4 INJECTION, SOLUTION INTRAMUSCULAR; INTRAVENOUS at 07:55

## 2024-07-23 RX ADMIN — ONDANSETRON 4 MG: 2 INJECTION INTRAMUSCULAR; INTRAVENOUS at 07:53

## 2024-07-23 RX ADMIN — ACETAMINOPHEN 1000 MG: 500 TABLET ORAL at 20:14

## 2024-07-23 RX ADMIN — TRANEXAMIC ACID 1000 MG: 10 INJECTION, SOLUTION INTRAVENOUS at 11:07

## 2024-07-23 RX ADMIN — VANCOMYCIN HYDROCHLORIDE 1750 MG: 1 INJECTION, POWDER, LYOPHILIZED, FOR SOLUTION INTRAVENOUS at 08:04

## 2024-07-23 RX ADMIN — FAMOTIDINE 20 MG: 10 INJECTION, SOLUTION INTRAVENOUS at 07:53

## 2024-07-23 RX ADMIN — SODIUM CHLORIDE, POTASSIUM CHLORIDE, SODIUM LACTATE AND CALCIUM CHLORIDE 100 ML/HR: 600; 310; 30; 20 INJECTION, SOLUTION INTRAVENOUS at 13:44

## 2024-07-23 RX ADMIN — ACETAMINOPHEN 1000 MG: 500 TABLET ORAL at 07:58

## 2024-07-23 RX ADMIN — VANCOMYCIN HYDROCHLORIDE 2000 MG: 1 INJECTION, POWDER, LYOPHILIZED, FOR SOLUTION INTRAVENOUS at 20:24

## 2024-07-23 RX ADMIN — TRANEXAMIC ACID 1000 MG: 10 INJECTION, SOLUTION INTRAVENOUS at 09:35

## 2024-07-23 RX ADMIN — DEXMEDETOMIDINE HYDROCHLORIDE 30 MCG: 100 INJECTION, SOLUTION INTRAVENOUS at 08:31

## 2024-07-23 RX ADMIN — EPHEDRINE SULFATE 10 MG: 50 INJECTION INTRAVENOUS at 10:15

## 2024-07-23 RX ADMIN — OXYCODONE HYDROCHLORIDE 10 MG: 5 TABLET ORAL at 17:19

## 2024-07-23 NOTE — THERAPY DISCHARGE NOTE
Acute Care - Occupational Therapy Discharge   Jody Olvera    Patient Name: Nabor Muhammad Dr.  : 1955    MRN: [id number]                              Today's Date: 2024       Admit Date: 2024    Visit Dx:     ICD-10-CM ICD-9-CM   1. Primary osteoarthritis of left knee  M17.12 715.16     Patient Active Problem List   Diagnosis    Complex tear of medial meniscus of left knee as current injury    Chondromalacia, knee    Benign fibroma of prostate    Cellulitis    Eunuchoidism    Obstructive apnea    Excess weight    Derangement of joint    Obstructive sleep apnea    Primary osteoarthritis of left knee    Mechanical knee pain, left    Complex tear of medial meniscus of right knee as current injury    Status post arthroscopic knee surgery    Pes anserinus bursitis of left knee    DDD (degenerative disc disease), lumbar    Spinal stenosis of lumbar region with neurogenic claudication    Numbness and tingling of right leg    Insufficiency fracture of tibia    Closed subchondral insufficiency fracture of condyle of left femur    Pre-op examination    S/P total knee arthroplasty     Past Medical History:   Diagnosis Date    Arthritis     GERD (gastroesophageal reflux disease)     Hypertension     Lumbosacral disc disease     OA (osteoarthritis)     YANETH (obstructive sleep apnea)     CPAP    Spinal stenosis of lumbar region      Past Surgical History:   Procedure Laterality Date    CHOLECYSTECTOMY      FEMUR CLOSED REDUCTION Left     KNEE ARTHROSCOPY Left 3/5/2024    Procedure: LEFT KNEE ARTHROSCOPY WITH OPEN ARTHROTOMY SUBCHONDROPLASTY, partial medial and lateral meniscectomy;  Surgeon: Terry Zarate MD;  Location: [facility] OR;  Service: Orthopedics;  Laterality: Left;    KNEE MENISCECTOMY Right 2021    Procedure: KNEE PARTIAL MEDIAL MENISCECTOMY,;  Surgeon: Terry Zarate MD;  Location: [facility] OR;  Service: Orthopedics;  Laterality: Right;    SHOULDER SURGERY Right     TONSILLECTOMY  AND ADENOIDECTOMY      WRIST GANGLION EXCISION Bilateral     R-1990 L-1996      General Information       Row Name 07/23/24 1336          OT Time and Intention    Document Type evaluation  -SD     Mode of Treatment occupational therapy  -SD       Row Name 07/23/24 1333          General Information    Patient Profile Reviewed yes  Pt s/p left TKA.  -SD     Prior Level of Function independent:;ADL's;all household mobility;community mobility  -SD     Existing Precautions/Restrictions brace worn when out of bed  left knee immobilizer  -SD     Barriers to Rehab none identified  -SD       Row Name 07/23/24 1336          Occupational Profile    Reason for Services/Referral (Occupational Profile) decreased adl's s/p left TKA  -SD     Successful Occupations (Occupational Profile) I with daily tasks  -SD     Environmental Supports and Barriers (Occupational Profile) spouse  -SD     Patient Goals (Occupational Profile) go home upon discharge  -SD       Row Name 07/23/24 1336          Living Environment    People in Home spouse  -SD       Row Name 07/23/24 1336          Home Main Entrance    Number of Stairs, Main Entrance two  to enter into kitchen through garage  -SD     Stair Railings, Main Entrance none  -SD       Row Name 07/23/24 1336          Stairs Within Home, Primary    Stairs, Within Home, Primary Pt can stay on 1st level of home without having to manage steps  -SD       Row Name 07/23/24 1337          Cognition    Orientation Status (Cognition) oriented x 4  -SD               User Key  (r) = Recorded By, (t) = Taken By, (c) = Cosigned By      Initials Name Provider Type    SD Twan Rae OTR Occupational Therapist                   Mobility/ADL's       Row Name 07/23/24 2439          Bed Mobility    Bed Mobility supine-sit;sit-supine  -SD     Supine-Sit [place] (Bed Mobility) minimum assist (75% patient effort)  -SD     Sit-Supine [place] (Bed Mobility) contact guard  -SD     Assistive Device (Bed  "Mobility) head of bed elevated;bed rails  -[facility] Name 07/23/24 1337          Transfers    Transfers sit-stand transfer;stand-sit transfer  -[facility] Name 07/23/24 1337          Sit-Stand Transfer    Sit-Stand [place] (Transfers) contact guard  -SD     Assistive Device (Sit-Stand Transfers) walker, front-wheeled  -SD       Row Name 07/23/24 1337          Stand-Sit Transfer    Stand-Sit [place] (Transfers) contact guard  -SD     Assistive Device (Stand-Sit Transfers) walker, front-wheeled  -SD       [facility] Name 07/23/24 1337          Functional Mobility    Functional Mobility- Ind. Level contact guard assist  -SD     Functional Mobility- Device walker, front-wheeled  -SD     Functional Mobility-Distance (Feet) 200  -[facility] Name 07/23/24 1337          Activities of Daily Living    BADL Assessment/Intervention --  Pt reports no concerns for management of adl tasks upon discharge to home.  -[facility] Name 07/23/24 1420 07/23/24 1337       Mobility    Extremity Weight-bearing Status --  -SD left lower extremity  -SD    Left Lower Extremity (Weight-bearing Status) -- weight-bearing as tolerated (WBAT)  -SD              User Key  (r) = Recorded By, (t) = Taken By, (c) = Cosigned By      Initials Name Provider Type    SD Twan Rae, OTR Occupational Therapist                   Obj/Interventions       [facility] Name 07/23/24 1421          Sensory Assessment (Somatosensory)    Sensory Assessment (Somatosensory) other (see comments)  pt reports some \"tingling\" in LE's from surgical block  -[facility] Name 07/23/24 1421          Range of Motion Comprehensive    Comment, General Range of Motion BUE rom/strength functional for tasks during evaluation  -[facility] Name 07/23/24 1421          Strength Comprehensive (MMT)    Comment, General Manual Muscle Testing (MMT) Assessment BUE rom/strength functional for tasks during evaluation  -[facility] Name 07/23/24 1421          Balance    Comment, " Balance I with sitting balance and CGA for standing balance using a rolling walker for support  -SD               User Key  (r) = Recorded By, (t) = Taken By, (c) = Cosigned By      Initials Name Provider Type    Twan Saeed OTR Occupational Therapist                   Goals/Plan    No documentation.                  Clinical Impression       Row Name 07/23/24 1424          Pain Assessment    Pretreatment Pain Rating 0/10 - no pain  -SD     Posttreatment Pain Rating 0/10 - no pain  -SD       Row Name 07/23/24 1424          Plan of Care Review    Plan of Care Reviewed With patient;spouse  -SD     Outcome Evaluation Occupational therapy evaluation completed. Pt s/p left TKA. Pt managed bed mobility with CGA/min assist and transfers/functional mobility x 200ft with CGA using a rolling walker for support. Pt reports no concerns for management of basic daily tasks upon discharge to home. Plan is [facility]. Pt has no DME needs. No further OT services recommended.  -SD       Row Name 07/23/24 1424          Therapy Assessment/Plan (OT)    Patient/Family Therapy Goal Statement (OT) go home upon discharge  -SD     Criteria for Skilled Therapeutic Interventions Met (OT) no problems identified which require skilled intervention;other (see comments)  Pt has no concerns for management of basic daily tasks s/p TKA  -SD     Therapy Frequency (OT) evaluation only  -SD       Row Name 07/23/24 1424          Therapy Plan Review/Discharge Plan (OT)    Anticipated Discharge Disposition (OT) home with outpatient therapy services  -SD       Row Name 07/23/24 1424          Positioning and Restraints    Pre-Treatment Position in bed  -SD     Post Treatment Position bed  -SD     In Bed supine;call light within reach;with family/caregiver  HOB elevated, ice pack on left knee  -SD               User Key  (r) = Recorded By, (t) = Taken By, (c) = Cosigned By      Initials Name Provider Type     Twan Saeed OTR Occupational Therapist                   Outcome Measures       Row Name 07/23/24 1428          How much help from another is currently needed...    Putting on and taking off regular lower body clothing? 3  -SD     Bathing (including washing, rinsing, and drying) 3  -SD     Toileting (which includes using toilet bed pan or urinal) 3  -SD     Putting on and taking off regular upper body clothing 4  -SD     Taking care of personal grooming (such as brushing teeth) 4  -SD     Eating meals 4  -SD     AM-PAC 6 Clicks Score (OT) 21  -SD       Row Name 07/23/24 1359          How much help from another person do you currently need...    Turning from your back to your side while in flat bed without using bedrails? 3  -LB     Moving from lying on back to sitting on the side of a flat bed without bedrails? 3  -LB     Moving to and from a bed to a chair (including a wheelchair)? 3  -LB     Standing up from a chair using your arms (e.g., wheelchair, bedside chair)? 3  -LB     Climbing 3-5 steps with a railing? 3  -LB     To walk in hospital room? 3  -LB     AM-PAC 6 Clicks Score (PT) 18  -LB     Highest Level of Mobility Goal 6 --> Walk 10 steps or more  -LB       Row Name 07/23/24 1338          Functional Assessment    Outcome Measure Options AM-PAC 6 Clicks Daily Activity (OT)  -SD               User Key  (r) = Recorded By, (t) = Taken By, (c) = Cosigned By      Initials Name Provider Type    Faith Frey RN Registered Nurse    Twan Saeed OTR Occupational Therapist                  Occupational Therapy Education       Title: PT OT SLP Therapies (Resolved)       Topic: Occupational Therapy (Resolved)       Point: ADL training (Resolved)       Description:   Instruct learner(s) on proper safety adaptation and remediation techniques during self care or transfers.   Instruct in proper use of assistive devices.                  Learning Progress Summary             Patient Acceptance, E,TB,D,  NANCY ROSE by SD at 7/23/2024 1428    Comment: Education regarding safety with daily tasks, bed mobility, transfers and mobility s/p TKA. Pt has no discharge concerns.   Significant Other Acceptance, E,TB,D, ROSE,NANCY by SD at 7/23/2024 1422    Comment: Education regarding safety with daily tasks, bed mobility, transfers and mobility s/p TKA. Pt has no discharge concerns.                                         User Key       Initials Effective Dates Name Provider Type Discipline    SD 06/16/21 -  Twan Rae OTKATHRIN Occupational Therapist OT                  OT Recommendation and Plan  Therapy Frequency (OT): evaluation only  Plan of Care Review  Plan of Care Reviewed With: patient, spouse  Outcome Evaluation: Occupational therapy evaluation completed. Pt s/p left TKA. Pt managed bed mobility with CGA/min assist and transfers/functional mobility x 200ft with CGA using a rolling walker for support. Pt reports no concerns for management of basic daily tasks upon discharge to home. Plan is for outpt physical therapy services at [facility]. Pt has no DME needs. No further OT services recommended.  Plan of Care Reviewed With: patient, spouse  Outcome Evaluation: Occupational therapy evaluation completed. Pt s/p left TKA. Pt managed bed mobility with CGA/min assist and transfers/functional mobility x 200ft with CGA using a rolling walker for support. Pt reports no concerns for management of basic daily tasks upon discharge to home. Plan is for outpt physical therapy services at [facility]. Pt has no DME needs. No further OT services recommended.     Time Calculation:   Evaluation Complexity (OT)  Review Occupational Profile/Medical/Therapy History Complexity: brief/low complexity  Assessment, Occupational Performance/Identification of Deficit Complexity: 1-3 performance deficits  Clinical Decision Making Complexity (OT): problem focused assessment/low complexity  Overall Complexity of Evaluation (OT): low complexity      Time Calculation- OT       Row Name 07/23/24 1430             Time Calculation- OT    OT Start Time 1355  -SD      OT Stop Time 1418  -SD      OT Time Calculation (min) 23 min  -SD         Untimed Charges    OT Eval/Re-eval Minutes 23  -SD         Total Minutes    Untimed Charges Total Minutes 23  -SD       Total Minutes 23  -SD                User Key  (r) = Recorded By, (t) = Taken By, (c) = Cosigned By      Initials Name Provider Type    SD Twan Rae OTR Occupational Therapist                  Therapy Charges for Today       Code Description Service Date Service Provider Modifiers Qty    [id number]  OT EVAL LOW COMPLEXITY 2 7/23/2024 Twan Rae OTR GO 1               OT Discharge Summary  Anticipated Discharge Disposition (OT): home with outpatient therapy services  Reason for Discharge: other (comment) (Pt has no concerns for management of basic adl tasks upon discharge to home.)    ROMY Villalobos  7/23/2024

## 2024-07-23 NOTE — ANESTHESIA POSTPROCEDURE EVALUATION
Patient: Nabor Muhammad Dr.    Procedure Summary       Date: 07/23/24 Room / Location:  [facility] OR 2 /  [facility]    Anesthesia Start: 0908 Anesthesia Stop: 1209    Procedure: TOTAL KNEE ARTHROPLASTY WITH TYRELL ROBOT (Left: Knee) Diagnosis:       Primary osteoarthritis of left knee      (Primary osteoarthritis of left knee [M17.12])    Surgeons: Terry Zarate MD Provider: Adela Nicolas CRNA    Anesthesia Type: regional, spinal, ERAS Protocol, MAC ASA Status: 3            Anesthesia Type: regional, spinal, ERAS Protocol, MAC    Vitals  Vitals Value Taken Time   BP 99/57 07/23/24 1300   Temp 98.3 °F (36.8 °C) 07/23/24 1214   Pulse 69 07/23/24 1304   Resp 15 07/23/24 1255   SpO2 89 % 07/23/24 1304   Vitals shown include unfiled device data.        Post Anesthesia Care and Evaluation    Patient location during evaluation: PHASE II  Patient participation: complete - patient participated  Level of consciousness: awake  Pain management: adequate    Airway patency: patent  Anesthetic complications: No anesthetic complications  PONV Status: none  Cardiovascular status: acceptable  Respiratory status: acceptable  Hydration status: acceptable

## 2024-07-23 NOTE — PLAN OF CARE
Goal Outcome Evaluation:  Plan of Care Reviewed With: patient           Outcome Evaluation: PT Evaluation Complete: Patient performs supine to/from sit transfers with CGA/min A, sit to/from stand transfers with CGA, and gait x 200 feet with CGA with use of FWW. Patient manages device safely, no loss of balance noted. Patient demonstrates decreased jd and reports feeling of lightheadedness during gait training. Patient would benefit from physical therapy to address deficits in functional mobility and LE strength/ROM. Plan to see patient 1-2 additional visits. Patient plans to follow up with outpatient PT at discharge.      Anticipated Discharge Disposition (PT): home with outpatient therapy services

## 2024-07-23 NOTE — ANESTHESIA PROCEDURE NOTES
Peripheral Block    Pre-sedation assessment completed: 7/23/2024 8:11 AM    Patient reassessed immediately prior to procedure    Patient location during procedure: pre-op  Start time: 7/23/2024 8:33 AM  Stop time: 7/23/2024 8:36 AM  Reason for block: at surgeon's request and post-op pain management  Performed by  CRNA/CAA: Adela Nicolas CRNASRNA: Herman Jefferson SRNA  Preanesthetic Checklist  Completed: patient identified, IV checked, site marked, risks and benefits discussed, surgical consent, monitors and equipment checked, pre-op evaluation and timeout performed  Prep:  Pt Position: supine  Sterile barriers:cap, gloves, mask and washed/disinfected hands  Prep: ChloraPrep  Patient monitoring: blood pressure monitoring, continuous pulse oximetry and EKG  Procedure    Sedation: yes  Performed under: local infiltration  Guidance:ultrasound guided    ULTRASOUND INTERPRETATION.  Using ultrasound guidance a 21 G gauge needle was placed in close proximity to the nerve, at which point, under ultrasound guidance anesthetic was injected in the area of the nerve and spread of the anesthesia was seen on ultrasound in close proximity thereto.  There were no abnormalities seen on ultrasound; a digital image was taken; and the patient tolerated the procedure with no complications. Images:still images obtained, printed/placed on chart    Laterality:left  Block Type:MEÑO  Injection Technique:single-shot  Needle Type:echogenic  Needle Gauge:21 G  Resistance on Injection: none    Medications Used: bupivacaine PF (MARCAINE) injection 0.25% - Injection   15 mL - 7/23/2024 8:36:00 AM      Post Assessment  Injection Assessment: negative aspiration for heme, no paresthesia on injection and incremental injection  Patient Tolerance:comfortable throughout block  Complications:no  Performed by: Herman Jefferson SRNA

## 2024-07-23 NOTE — ANESTHESIA PROCEDURE NOTES
Airway  Urgency: elective    Date/Time: 7/23/2024 9:24 AM  Airway not difficult    General Information and Staff    Patient location during procedure: OR  CRNA/CAA: Adela Nicolas CRNA    Indications and Patient Condition  Indications for airway management: airway protection    Preoxygenated: yes  Mask difficulty assessment: 0 - not attempted    Final Airway Details  Final airway type: supraglottic airway      Successful airway: unique  Size 5     Number of attempts at approach: 2  Assessment: lips, teeth, and gum same as pre-op    Additional Comments  Tried LMA 4 first, wouldn't seat correctly, so switched to LMA 5 which worked well.

## 2024-07-23 NOTE — ANESTHESIA PREPROCEDURE EVALUATION
Anesthesia Evaluation     Patient summary reviewed and Nursing notes reviewed   NPO Solid Status: > 8 hours  NPO Liquid Status: > 8 hours           Airway   Mallampati: III  TM distance: >3 FB  Neck ROM: full  Large neck circumference  Dental - normal exam   (+) implants    Pulmonary - normal exam    breath sounds clear to auscultation  (+) ,sleep apnea on CPAP  Cardiovascular - normal exam  Exercise tolerance: good (4-7 METS)    ECG reviewed  Rhythm: regular  Rate: normal    (+) hypertension well controlled 2 medications or greater    ROS comment: Cialis 5mg today for prostate, Olmesartan too.    HEART RATE= 65  bpm  RR Interval= 920  ms  NE Interval= 180  ms  P Horizontal Axis= -22  deg  P Front Axis= 51  deg  QRSD Interval= 107  ms  QT Interval= 435  ms  QTcB= 454  ms  QRS Axis= -26  deg  T Wave Axis= -15  deg  - BORDERLINE ECG -  Sinus rhythm  Borderline left axis deviation  Borderline T abnormalities, inferior leads  When compared with ECG of 16-Jul-2021 8:08:06,  No significant change   Electronically Signed By: Harman Levine ([facility]) 01-Mar-2024 16:50:32  Date and Time of Study: 2024-03-01 08:21:09        Neuro/Psych  (-) numbness    ROS Comment: Numbness, tingling right leg  GI/Hepatic/Renal/Endo    (+) GERD well controlled    Musculoskeletal     (+) back pain      ROS comment: Lumbar spinal stenosis   Abdominal   (+) obese    Abdomen: soft.  Bowel sounds: normal.   Substance History   (+) alcohol use (1-2 times weekly)      Comment: socially   OB/GYN negative ob/gyn ROS         Other   arthritis,     ROS/Med Hx Other: PCP Clearance 7/17/24 in media tab                    Anesthesia Plan    ASA 3     regional, spinal, ERAS Protocol and MAC   total IV anesthesia  intravenous induction     Anesthetic plan, risks, benefits, and alternatives have been provided, discussed and informed consent has been obtained with: patient and spouse/significant other.    Use of blood products discussed with patient and  spouse/significant other  Consented to blood products.    Plan discussed with CRNA.      CODE STATUS:

## 2024-07-23 NOTE — ANESTHESIA PROCEDURE NOTES
Peripheral Block    Pre-sedation assessment completed: 7/23/2024 8:11 AM    Patient reassessed immediately prior to procedure    Patient location during procedure: pre-op  Start time: 7/23/2024 8:15 AM  Stop time: 7/23/2024 8:22 AM  Reason for block: at surgeon's request and post-op pain management  Performed by  CRNA/CAA: Adela Nicolas CRNASRNA: Herman Jefferson SRNA  Preanesthetic Checklist  Completed: patient identified, IV checked, site marked, risks and benefits discussed, surgical consent, monitors and equipment checked, pre-op evaluation and timeout performed  Prep:  Pt Position: supine  Sterile barriers:cap, gloves, mask and washed/disinfected hands  Prep: ChloraPrep  Patient monitoring: blood pressure monitoring, continuous pulse oximetry and EKG  Procedure    Sedation: yes  Performed under: local infiltration  Guidance:ultrasound guided    ULTRASOUND INTERPRETATION.  Using ultrasound guidance a 21 G gauge needle was placed in close proximity to the nerve, at which point, under ultrasound guidance anesthetic was injected in the area of the nerve and spread of the anesthesia was seen on ultrasound in close proximity thereto.  There were no abnormalities seen on ultrasound; a digital image was taken; and the patient tolerated the procedure with no complications. Images:still images obtained, printed/placed on chart    Laterality:left  Block Type:adductor canal block  Injection Technique:catheter  Needle Type:echogenic  Needle Gauge:21 G  Resistance on Injection: none  Catheter Size:20 G    Medications Used: dexmedetomidine HCl (PRECEDEX) injection - Perineural   30 mcg - 7/23/2024 8:22:00 AM  bupivacaine PF (MARCAINE) injection 0.25% - Injection   10 mL - 7/23/2024 8:22:00 AM      Post Assessment  Injection Assessment: negative aspiration for heme, no paresthesia on injection and incremental injection  Patient Tolerance:comfortable throughout block  Complications:no  Performed by: Herman Jefferson,  SRNA

## 2024-07-23 NOTE — ANESTHESIA PROCEDURE NOTES
Peripheral Block    Pre-sedation assessment completed: 7/23/2024 8:11 AM    Patient reassessed immediately prior to procedure    Patient location during procedure: pre-op  Start time: 7/23/2024 8:28 AM  Stop time: 7/23/2024 8:31 AM  Reason for block: at surgeon's request and post-op pain management  Performed by  CRNA/CAA: Adela Nicolas CRNASRNA: Herman Jefferson SRNA  Preanesthetic Checklist  Completed: patient identified, IV checked, site marked, risks and benefits discussed, surgical consent, monitors and equipment checked, pre-op evaluation and timeout performed  Prep:  Pt Position: supine  Sterile barriers:cap, gloves, mask and washed/disinfected hands  Prep: ChloraPrep  Patient monitoring: blood pressure monitoring, continuous pulse oximetry and EKG  Procedure    Sedation: yes  Performed under: local infiltration  Guidance:ultrasound guided    ULTRASOUND INTERPRETATION.  Using ultrasound guidance a 21 G gauge needle was placed in close proximity to the nerve, at which point, under ultrasound guidance anesthetic was injected in the area of the nerve and spread of the anesthesia was seen on ultrasound in close proximity thereto.  There were no abnormalities seen on ultrasound; a digital image was taken; and the patient tolerated the procedure with no complications. Images:still images obtained, printed/placed on chart    Laterality:left  Block Type:iPack  Injection Technique:single-shot  Needle Type:echogenic  Needle Gauge:21 G  Resistance on Injection: none    Medications Used: dexmedetomidine HCl (PRECEDEX) injection - Intravenous   30 mcg - 7/23/2024 8:31:00 AM  bupivacaine PF (MARCAINE) injection 0.25% - Injection   20 mL - 7/23/2024 8:31:00 AM      Post Assessment  Injection Assessment: negative aspiration for heme, no paresthesia on injection and incremental injection  Patient Tolerance:comfortable throughout block  Complications:no  Performed by: Herman Jefferson, SRNA

## 2024-07-23 NOTE — PLAN OF CARE
Goal Outcome Evaluation:  Plan of Care Reviewed With: patient        Progress: no change  Outcome Evaluation: New pt from surgery for a left total knee replacement. His dressing remains D & I, wiggles toes freely, and foot warm to touch. IV is S/l, pain ball continues with Bupivacaine 0.125% infusing at 8 ml/hr. Pt has voided 250ml, hesistancy noted. Up to bathroom with x1 assist /walker/gait belt/ immobilizer on operative leg. Tylenol 1000mg given as ordered with little relief, Oxy 10 given as requested, ice cont. Pt does have a hx of sleep apnea, his home CPAP is at bedside, he remains room air o2 sat 96%. B/P soft 99/54, afebrile. Visiting with wife at bedside

## 2024-07-23 NOTE — DISCHARGE INSTRUCTIONS
Total Knee Replacement Discharge Instructions:    I. ACTIVITIES:  1. Exercises:  Complete exercise program as taught by the hospital physical therapist 2 times per day  Exercise program will be advanced by the physical therapist  During the day be up ambulating every 2 hours (while awake) for short distances  Complete the ankle pump exercises at least 10 times per hour (while awake)  Elevate legs most of the day the first week post operatively and thereafter elevate legs when in bed and for at least 30 minutes during the day. Caution must be taken to avoid pillow placement under the bend of the knee as this can lead to flexion contractures of the knee.  Use cold packs 20-30 minutes approximately 5 times per day. This should be done before and after completing your exercises and at any time you are experiencing pain/ stiffness in your operative extremity.  Apply 6 inch ace wraps to operative extremity from ankle to groin. Please keep in place at all times except when bathing.      2. Activities of Daily Living:  No tub baths, hot tubs, or swimming pools for 4 weeks  May shower on post-operative day #3- Do not scrub or rub around the incision or mesh. No soap or alcohol to incision, just let water run over wound.         II. RESTRICTIONS  Do not cross legs or kneel  Your surgeon will discuss with you when you will be able to drive again.  Weight bearing as tolerated  First week stay inside on even terrain. May go up and down stairs one stair at a time utilizing the hand rail  After one week, you may venture outside.     III. PRECAUTIONS:  Everyone that comes near you should wash their hands  No elective dental, genital-urinary, or colon procedures or surgical procedures for 12 weeks after surgery unless absolutely necessary.   If dental work or surgical procedure is deemed absolutely necessary, you will need to contact your surgeon as you will need to take antibiotics 1 hour prior to any dental work (including teeth  cleanings).  Please discuss with your surgeon prophylactic antibiotics as the length of time this intervention will be necessary for you varies with each patient’s health history and situation.  Avoid sick people. If you must be around someone who is ill, they should wear a mask.  Avoid visits to the Emergency Room or Urgent Care unless you are having a life              threatening event.     IV. INCISION CARE:  Wash your hands prior to dressing changes  Incision and knee should be covered with an ACE wrap daily for compression. No creams or ointments to the incision  Do not touch or pick at the incision  Check incision every day and notify surgeon immediately if any of the following signs or symptoms are noted:  Increase in redness  Increase in swelling around the incision and of the entire extremity  Increase in pain  Drainage oozing from the incision  Pulling apart of the edges of the incision  Increase in overall body temperature (greater than 100.5 degrees)  You will be given further instructions on removal of the glue and mesh over your incision at your first follow up visit at the office.     V. MEDICATIONS:   1. Anticoagulants: You will be discharged on an anticoagulant, typically either Aspirin or Eliquis. This is a prophylactic medication that helps prevent blood clots during your post-operative period. The type and length of dosage varies based on your individual needs, procedure performed, and surgeon’s preference.  While taking the anticoagulant, you should avoid taking any additional aspirin, ibuprofen (Advil or Motrin), Aleve (Naprosyn) or other non-steroidal anti-inflammatory medications.   Notify surgeon immediately if any micah bleeding is noted in the urine, stool, emesis, or from the nose or the incision. Blood in the stool will often appear as black rather than red. Blood in urine may appear as pink. Blood in emesis may appear as brown/black like coffee grounds.  You will need to apply pressure  for longer periods of time to any cuts or abrasions to stop bleeding  Avoid alcohol while taking anticoagulants    2. Stool Softeners: You will be at greater risk of constipation after surgery due to being less mobile and the pain medications.   Take stool softeners as instructed by your surgeon while on pain medications. Over the counter Colace 100 mg 1-2 capsules twice daily.   If stools become too loose or too frequent, please decreases the dosage or stop the stool softener.  If constipation occurs despite use of stool softeners, you are to continue the stool softeners and add a laxative (Milk of Magnesia 1 ounce daily as needed)  Drink plenty of fluids, and eat fruits and vegetables during your recovery time    3. Pain Medications utilized after surgery are narcotics and the law requires that the following information be given to all patients that are prescribed narcotics:  CLASSIFICATION: Pain medications are called Opioids and are narcotics  LEGALITIES: It is illegal to share narcotics with others and to drive within 24 hours of taking narcotics  POTENTIAL SIDE EFFECTS: Potential side effects of opioids include: nausea, vomiting, itching, dizziness, drowsiness, dry mouth, constipation, and difficulty urinating.  POTENTIAL ADVERSE EFFECTS:   Opioid tolerance can develop with use of pain medications and this simply means that it requires more and more of the medication to control pain; however, this is seen more in patients that use opioids for longer periods of time.  Opioid dependence can develop with use of Opioids and this simply means that to stop the medication can cause withdrawal symptoms; however, this is seen with patients that use Opioids for longer periods of time.  Opioid addiction can develop with use of Opioids and the incidence of this is very unlikely in patients who take the medications as ordered and stop the medications as instructed.  Opioid overdose can be dangerous, but is unlikely when  the medication is taken as ordered and stopped when ordered. It is important not to mix opioids with alcohol or with and type of sedative such as Benadryl as this can lead to over sedation and respiratory difficulty.  DOSAGE:   Pain medications will need to be taken consistently for the first week to decrease pain and promote adequate pain relief and participation in physical therapy.  After the initial surgical pain begins to resolve, you may begin to decrease the pain medication. By the end of 6 weeks, you should be off of pain medications.  Refills will not be given by the office during evening hours, on weekends, or after 12 weeks post-op.  To seek refills on pain medications during the initial 6 week post-operative period, you must call the office 48 hours in advance to request the refill. The office will then notify you when to  the prescription. DO NOT wait until you are out of the medication to request a refill.    VI. FOLLOW-UP VISITS:  You will need to follow up in the office with Kya Burns Nurse Practitioner in 2 weeks. Please call  [phone]  to schedule this appointment.  You will need to follow up with your primary care physician within 4 weeks.  If you have any concerns or suspected complications prior to your follow up visit, please call your surgeons office. Do not wait until your appointment time if you suspect complications. These will need to be addressed in the office promptly.

## 2024-07-23 NOTE — PLAN OF CARE
Problem: Adult Inpatient Plan of Care  Goal: Plan of Care Review  Recent Flowsheet Documentation  Taken 7/23/2024 1424 by Twan Rae OTR  Plan of Care Reviewed With:   patient   spouse  Outcome Evaluation: Occupational therapy evaluation completed. Pt s/p left TKA. Pt managed bed mobility with CGA/min assist and transfers/functional mobility x 200ft with CGA using a rolling walker for support. Pt reports no concerns for management of basic daily tasks upon discharge to home. Plan is for outpt physical therapy services at [facility]. Pt has no DME needs. No further OT services recommended.

## 2024-07-23 NOTE — OP NOTE
Date of Surgery: 7/23/2024    Preoperative diagnosis: left knee osteoarthritis    Postoperative diagnosis: left knee osteoarthritis    Procedure:  1.left total knee arthroplasty  2. Intraoperative use of robotic navigation    Surgical Approach: Knee Medial Parapatellar         Surgeon: Elliot Muniz.:  Assistant: Wilfredo Calvillo, ELDER was responsible for performing the following activities: Retraction, Irrigation, Closing, and Placing Dressing and their skilled assistance was necessary for the success of this case.      Anesthesia: regional, spinal, ERAS Protocol, MAC    Estimated blood loss: <500ml    Fluids: per anesthesia    Specimens: None    Complications: None    Drains: None    Tourniquet time: Less than 2 hours at 300 mm    Findings: left knee end-stage osteoarthritis     Implants used: Marzena Persona total knee arthroplasty system with a size G tibia with 30 mm stem extension, size 12 cruciate retaining femoral component , 10 mm highly cross-linked medial congruent polyethylene insert, antibiotic cement    Examination under anesthesia: left knee passive range of motion 3-120°, varus alignment, no open wounds lacerations or abrasions over knee, stable to varus and valgus stress at 3 and 30°, 2+ dorsalis pedis pulse.    Indications for procedure: Patient is a pleasant 68 y.o. male who has had significant pain to left knee over the last several years, failed improvement with meniscectomy and subchondroplasty for subchondral insufficiency fractures.  He has also failed conservative treatment with intra-articular injections, bracing, home exercise program, activity modification, anti-inflammatory medications. Has had persistent pain limiting activities of daily living and even has had pain at rest. We discussed treatment options and patient wished to proceed with above-mentioned surgery. Was explained details of procedure as well as risks benefits and alternatives as documented on history and physical and had  all questions answered. No guarantees were given in regards to results of the surgery.    Details of procedure: Patient was seen, evaluated, and cleared for surgery by anesthesia. Had been medically optimized by primary care physician. Patient was met in the preoperative hold area, operative site was marked, consent was reviewed, history and physical was updated, and preoperative labs were reviewed. Regional block and spinal were placed per anesthesia and patient was taken to the operating room and placed in supine position on a regular OR table. Examination under anesthesia was carried out this time. Nonsterile tourniquet was then applied to left lower extremity. Patient was secured to the table with a waist strap and all bony prominences were well-padded. left lower extremity was then sterilely prepped and draped in standard fashion.    Formal timeout was completed including confirmation of history and physical, operative consent, operative site, patient identification number, and preoperative antibiotics administration. left leg was then exsanguinated and tourniquet inflated to 300 mmHg. Procedure was then begun with longitudinal incision over the anterior aspect of the left knee through skin and subcutaneous tissue with 15 blade. Minimal subcutaneous flaps were developed at this point in time, and standard medial parapatellar arthrotomy was then created at this point. Portion of suprapatellar and infrapatellar fat pad were resected this point in time.  Minimal medial tibial peel was carried out in order to allow for appropriate exposure of the knee. Medial and lateral meniscus were resected this time and ACL was excised with limited release of the PCL.    Patella was then everted and noted to have intact articular cartilage and thus we elected to not resurface it at this point in time    Attention was then turned to placement of navigational tracking devices for use of the [employer] robotic system.2 pins were placed  in the anterior medial metaphyseal region of the distal femur and tracker was secured into position over these pins.  A 3 cm incision through skin only was made over the anteromedial face of the tibia with soft tissue dissection down to the cortical bone.  2 pins were placed in likewise fashion in the anterior medial tibia and the tibial tracker was secured to these pins with stable positioning noted at this time.  Referencing sequence for the robotic device was then completed as well as assessment of extension and flexion gaps and soft tissue balance as well as overall alignment of the knee at this time.     Intraoperative plan for the surgical procedure was then completed with adjustments made to accomplish symmetric gaps medial and lateral on both flexion and extension to the greatest extent possible while allowing for additional minimal soft tissue release.  Once we achieved an intraoperative plan for a balanced knee, we proceeded to bone-cutting portion of the procedure.    Attention was then turned to the distal femur with the knee being brought into a flexed position.  Robotic arm was brought into the operative field, delivered to the anterior cortex of the femur and pinned into position at this point time. Depth of the resection was checked with a sickle and noted to be appropriate. Oscillating saw was then used to create a distal femoral cut in standard fashion while collateral ligaments were protected with Z retractors. Bone was removed at this time.  Cut validation was completed with navigational device at this point time as well confirming appropriate cut consistent with set parameters as noted in intraoperative plan. Pins were removed and the robotic arm and cut block were then adjusted to the distal femoral cut surface at this point in time.  Once robotic arm was positioned appropriate according to plan, 2 pins were inserted into the distal femur to allow for later placement of the 4-in-1 cut guide for  the final femoral cut.     Attention was then turned to the proximal tibia.  Robotic arm was then brought into the operative field over the proximal tibia with reference position being obtained according to intraoperative plan and stability of the knee being held carefully at this point time.  2 pins were placed and locked the cut block into position.  Once position was obtained and appropriate, robotic arm was removed and attention was then turned to additional soft tissue resection particularly around the posterior lateral and posterior medial aspects of the tibia.  Posterior tibial retractor was then placed at this point time which allowed for anterior translation of the tibia. Tibial cutting block resection depth was checked with a sickle. Oscillating saw was then used to complete the proximal tibial cut while collateral ligaments were protected with Z retractors. Bone fragments were removed and measured. Knee was brought into full extension with extension gap being checked with spacer block at this time and noted be acceptable with knee brought into full extension, stable medial and lateral collateral stability at full extension.      Attention was then returned to the distal femur with size 12 femoral cutting block impacted onto the distal femoral cut surface and pinned into position. Sickle was used to check the anterior cut and there was no evidence of anticipated notching. Collateral ligaments were protected with Z retractors while anterior, posterior, and chamfer cuts were created in standard fashion with oscillating saw. Femoral cutting block was removed at this time as well as bone fragments. Posterior capsule was stripped at this time. Size 12 femoral trial was placed. Size G tibial baseplate trial with 10 mm polyethylene trial was inserted. Knee was then ranged from 0-135°, good varus and valgus stability at full extension and 30° as well as throughout mid flexion with good AP translation at 90° of  flexion noted.    Patella was noted to track in midline with no evidence of subluxation. Knee was ranged from full extension to full flexion and tibial rotation was noted with midline of tibial trial being marked with Bovie electrocautery at the proximal tibia. Femoral and patellar and tibial trials were removed at this point in time and tibia was subluxated anteriorly with posterior retractor. Tibial trial baseplate was placed once again, position confirmed with drop ryan as well as with previous marking for tibial rotation and assessing for bony coverage of implant. Once position of tibial baseplate was noted to be appropriate, 2 pins were placed at this time, and reamer and punch were then used through the tibial baseplate.    All trial components were once again removed at this time and pulsatile lavage was used to thoroughly clean all bony cut surfaces as well as subcutaneous tissue. Final implants were opened on the back table this time. Cement was prepared on the back table and was applied to the cut surfaces of the distal femur, proximal tibia, and patella as well as to the backside of the implants. Tibial component was placed first followed by distal femur followed by patella. Extraneous cement was removed with freer at this time. Once all implants were in position knee was brought into full extension with axial compression to allow for cement to cure fully.    Once cement was completely hardened, trial polyethylene insert was assessed, range of motion of knee from 0-135° was achieved with good varus and valgus stability throughout range of motion and good AP translation at 90° of flexion. Thus a 10 mm polyethylene insert was opened on the back table, inserted and locked in appropriately into the tibial baseplate. Knee was thoroughly irrigated with a second evaluation for any additional bone fragments or cement particles. Knee was then thoroughly irrigated with Betadine solution followed by pulsatile lavage.  1 g of vancomycin powder was added to deep and subcutaneous tissue at this time.    Attention was then turned to removal of navigational trackers and pins from distal femur and proximal tibia at this point time.  The separate small proximal tibia incision was irrigated as well with Betadine solution followed by saline.    Attention was then turned to closure of the wound with running self locking #2 suture ×1, 2-0 Vicryls in inverted fashion for deep subcutaneous closure, 3-0 running self locking strata-fix suture, and glue and mesh for skin. Wound was dressed with Telfa, 4 x 4 gauze, web roll, ABD pad, Ace wrap, and patient was placed in knee immobilizer and given ice pack. At the end of procedure all lap, needle and sponge counts were correct ×2. Patient had brisk cap refill all digits left lower extremity, compartments are soft and easily compressible at the end of the procedure.    Disposition: Patient was taken to recovery room in stable condition. Will be admitted for pain control and initiation of therapy, weight-bear as tolerated left lower extremity, DVT prophylaxis will be started on postoperative day #1 with aspirin. Results of the procedure were discussed immediately postoperatively with patient's family and they had all questions answered at that time.

## 2024-07-23 NOTE — THERAPY EVALUATION
Patient Name: Nabor Muhammad Dr.  : 1955    MRN: [id number]                              Today's Date: 2024       Admit Date: 2024    Visit Dx:     ICD-10-CM ICD-9-CM   1. Primary osteoarthritis of left knee  M17.12 715.16     Patient Active Problem List   Diagnosis    Complex tear of medial meniscus of left knee as current injury    Chondromalacia, knee    Benign fibroma of prostate    Cellulitis    Eunuchoidism    Obstructive apnea    Excess weight    Derangement of joint    Obstructive sleep apnea    Primary osteoarthritis of left knee    Mechanical knee pain, left    Complex tear of medial meniscus of right knee as current injury    Status post arthroscopic knee surgery    Pes anserinus bursitis of left knee    DDD (degenerative disc disease), lumbar    Spinal stenosis of lumbar region with neurogenic claudication    Numbness and tingling of right leg    Insufficiency fracture of tibia    Closed subchondral insufficiency fracture of condyle of left femur    Pre-op examination    S/P total knee arthroplasty     Past Medical History:   Diagnosis Date    Arthritis     GERD (gastroesophageal reflux disease)     Hypertension     Lumbosacral disc disease     OA (osteoarthritis)     YANETH (obstructive sleep apnea)     CPAP    Spinal stenosis of lumbar region      Past Surgical History:   Procedure Laterality Date    CHOLECYSTECTOMY      FEMUR CLOSED REDUCTION Left     KNEE ARTHROSCOPY Left 3/5/2024    Procedure: LEFT KNEE ARTHROSCOPY WITH OPEN ARTHROTOMY SUBCHONDROPLASTY, partial medial and lateral meniscectomy;  Surgeon: Terry Zarate MD;  Location:  [facility];  Service: Orthopedics;  Laterality: Left;    KNEE MENISCECTOMY Right 2021    Procedure: KNEE PARTIAL MEDIAL MENISCECTOMY,;  Surgeon: Terry Zarate MD;  Location:  [facility];  Service: Orthopedics;  Laterality: Right;    SHOULDER SURGERY Right     TONSILLECTOMY AND ADENOIDECTOMY      WRIST GANGLION EXCISION Bilateral      R-1990 L-1996      General Information       Row Name 07/23/24 1423          Physical Therapy Time and Intention    Document Type evaluation  -BP     Mode of Treatment physical therapy  -BP       Row Name 07/23/24 1423          General Information    Patient Profile Reviewed yes  Patient admitted s/p L TKA  -BP     Prior Level of Function --  Patient reports independence with all mobility and ADLs at baseline without use of an AD.  -BP     Existing Precautions/Restrictions brace worn when out of bed  left knee immobilizer.  -BP     Barriers to Rehab none identified  -BP       Row Name 07/23/24 1423          Living Environment    People in Home spouse  -BP       Row Name 07/23/24 1423          Home Main Entrance    Number of Stairs, Main Entrance two  -BP     Stair Railings, Main Entrance none  -BP       Row Name 07/23/24 1423          Stairs Within Home, Primary    Stairs, Within Home, Primary one story home with  basement, will remain on main level  -BP       Row Name 07/23/24 1423          Cognition    Orientation Status (Cognition) oriented x 4  -BP       Row Name 07/23/24 1423          Safety Issues, Functional Mobility    Comment, Safety Issues/Impairments (Mobility) WFL  -BP               User Key  (r) = Recorded By, (t) = Taken By, (c) = Cosigned By      Initials Name Provider Type    BP Divya Reed, PT Physical Therapist                   Mobility       Row Name 07/23/24 1355          Bed Mobility    Bed Mobility supine-sit;sit-supine  -BP     Supine-Sit [place] (Bed Mobility) minimum assist (75% patient effort)  -BP     Sit-Supine [place] (Bed Mobility) contact guard  -BP     Assistive Device (Bed Mobility) head of bed elevated;bed rails  -BP     Comment, (Bed Mobility) assist with L LE to EOB during supine to sit transfer  -BP       Row Name 07/23/24 1356          Transfers    Comment, (Transfers) Verbal cues for hand palcement  -BP       Row Name 07/23/24 1355          Sit-Stand Transfer     Sit-Stand [place] (Transfers) contact guard  -BP     Assistive Device (Sit-Stand Transfers) walker, front-wheeled  -BP       Row Name 07/23/24 4993          Gait/Stairs (Locomotion)    [place] Level (Gait) contact guard;verbal cues  -BP     Assistive Device (Gait) walker, front-wheeled  -BP     Patient was able to Ambulate yes  -BP     Distance in Feet (Gait) 200  -BP     Deviations/Abnormal Patterns (Gait) stride length decreased;jd decreased  -BP     Bilateral Gait Deviations forward flexed posture  -BP     Comment, (Gait/Stairs) No loss of balance noted. Patient manages device safely. Decreased jd noted.  -BP       Row Name 07/23/24 1492          Mobility    Extremity Weight-bearing Status left lower extremity  -BP     Left Lower Extremity (Weight-bearing Status) weight-bearing as tolerated (WBAT)  -BP               User Key  (r) = Recorded By, (t) = Taken By, (c) = Cosigned By      Initials Name Provider Type    Divya Escalera, PT Physical Therapist                   Obj/Interventions       Row Name 07/23/24 2339          Range of Motion Comprehensive    Comment, General Range of Motion R LE AROM WFL. L ankle AROM WFL. L knee not formerly assessed  -BP       Row Name 07/23/24 1355          Strength Comprehensive (MMT)    Comment, General Manual Muscle Testing (MMT) Assessment R LE gross MMT 5/5. L LE strength not formerly assessed however able to perform SLR withot assist without knee immobilizer on.  -BP       Row Name 07/23/24 1355          Balance    Comment, Balance sitting balance-independent. Static standing balance-CGA with device  -BP       Row Name 07/23/24 8861          Sensory Assessment (Somatosensory)    Sensory Assessment (Somatosensory) --  Patient reports tingling in B feet-due to surgical block  -BP               User Key  (r) = Recorded By, (t) = Taken By, (c) = Cosigned By      Initials Name Provider Type    Divya Escalera, PT Physical Therapist                    Goals/Plan       Row Name 07/23/24 1431          Bed Mobility Goal 1 (PT)    Activity/Assistive Device (Bed Mobility Goal 1, PT) bed mobility activities, all  -BP     [place] Level/Cues Needed (Bed Mobility Goal 1, PT) supervision required  -BP     Time Frame (Bed Mobility Goal 1, PT) 2 days  -BP     Progress/Outcomes (Bed Mobility Goal 1, PT) new goal  -BP       Row Name 07/23/24 1431          Transfer Goal 1 (PT)    Activity/Assistive Device (Transfer Goal 1, PT) transfers, all;walker, rolling  -BP     [place] Level/Cues Needed (Transfer Goal 1, PT) supervision required  -BP     Time Frame (Transfer Goal 1, PT) 2 days  -BP     Progress/Outcome (Transfer Goal 1, PT) new goal  -       Row Name 07/23/24 1431          Gait Training Goal 1 (PT)    Activity/Assistive Device (Gait Training Goal 1, PT) gait (walking locomotion);walker, rolling  -BP     [place] Level (Gait Training Goal 1, PT) supervision required  -BP     Distance (Gait Training Goal 1, PT) 250  -BP     Time Frame (Gait Training Goal 1, PT) 2 days  -BP     Progress/Outcome (Gait Training Goal 1, PT) new goal  -BP       Row Name 07/23/24 1431          Stairs Goal 1 (PT)    Activity/Assistive Device (Stairs Goal 1, PT) ascending stairs;descending stairs  -BP     [place] Level/Cues Needed (Stairs Goal 1, PT) contact guard required  -BP     Number of Stairs (Stairs Goal 1, PT) 2 with no handrails  -BP     Time Frame (Stairs Goal 1, PT) 2 days  -BP     Progress/Outcome (Stairs Goal 1, PT) new goal  -BP       Row Name 07/23/24 1431          Therapy Assessment/Plan (PT)    Planned Therapy Interventions (PT) balance training;bed mobility training;gait training;home exercise program;patient/family education;transfer training;stretching;strengthening;stair training;ROM (range of motion)  -BP               User Key  (r) = Recorded By, (t) = Taken By, (c) = Cosigned By      Initials Name Provider Type    BP Divya Reed, PT Physical  Therapist                   Clinical Impression       Row Name 07/23/24 5367          Pain    Pretreatment Pain Rating 0/10 - no pain  -BP     Posttreatment Pain Rating 0/10 - no pain  -BP     Additional Documentation Pain Scale: Numbers Pre/Post-Treatment (Group)  -BP       Row Name 07/23/24 9153          Plan of Care Review    Plan of Care Reviewed With patient  -BP     Outcome Evaluation PT Evaluation Complete: Patient performs supine to/from sit transfers with CGA/min A, sit to/from stand transfers with CGA, and gait x 200 feet with CGA with use of FWW. Patient manages device safely, no loss of balance noted. Patient demonstrates decreased jd and reports feeling of lightheadedness during gait training. Patient would benefit from physical therapy to address deficits in functional mobility and LE strength/ROM. Plan to see patient 1-2 additional visits. Patient plans to follow up with outpatient PT at discharge.  -BP       Row Name 07/23/24 7742          Therapy Assessment/Plan (PT)    Rehab Potential (PT) good, to achieve stated therapy goals  -BP     Criteria for Skilled Interventions Met (PT) yes;meets criteria  -BP     Therapy Frequency (PT) other (see comments)  1-2 visits  -BP     Predicted Duration of Therapy Intervention (PT) 2 days  -BP       Row Name 07/23/24 8507          Positioning and Restraints    Pre-Treatment Position in bed  -BP     Post Treatment Position bed  -BP     In Bed supine;call light within reach;encouraged to call for assist;with family/caregiver;exit alarm on  -BP               User Key  (r) = Recorded By, (t) = Taken By, (c) = Cosigned By      Initials Name Provider Type    BP Divya Reed, PT Physical Therapist                   Outcome Measures       Row Name 07/23/24 1432 07/23/24 1354       How much help from another person do you currently need...    Turning from your back to your side while in flat bed without using bedrails? 3  -BP 3  -LB    Moving from lying on back  to sitting on the side of a flat bed without bedrails? 3  -BP 3  -LB    Moving to and from a bed to a chair (including a wheelchair)? 3  -BP 3  -LB    Standing up from a chair using your arms (e.g., wheelchair, bedside chair)? 3  -BP 3  -LB    Climbing 3-5 steps with a railing? 3  -BP 3  -LB    To walk in hospital room? 3  -BP 3  -LB    AM-PAC 6 Clicks Score (PT) 18  -BP 18  -LB    Highest Level of Mobility Goal 6 --> Walk 10 steps or more  -BP 6 --> Walk 10 steps or more  -LB      Row Name 07/23/24 1432 07/23/24 1338       Functional Assessment    Outcome Measure Options AM-PAC 6 Clicks Basic Mobility (PT)  -BP AM-PAC 6 Clicks Daily Activity (OT)  -SD              User Key  (r) = Recorded By, (t) = Taken By, (c) = Cosigned By      Initials Name Provider Type     Faith Jarrett, CAMILLE Registered Nurse    Twan Saeed OTR Occupational Therapist    Divya Escalera, MAHAMED Physical Therapist                                 Physical Therapy Education       Title: PT OT SLP Therapies (In Progress)       Topic: Physical Therapy (In Progress)       Point: Mobility training (Done)       Learning Progress Summary             Patient Acceptance, E,TB, VU by  at 7/23/2024 1432                         Point: Home exercise program (Not Started)       Learner Progress:  Not documented in this visit.                              User Key       Initials Effective Dates Name Provider Type Discipline     06/16/21 -  Divya Reed, PT Physical Therapist PT                  PT Recommendation and Plan  Planned Therapy Interventions (PT): balance training, bed mobility training, gait training, home exercise program, patient/family education, transfer training, stretching, strengthening, stair training, ROM (range of motion)  Plan of Care Reviewed With: patient  Outcome Evaluation: PT Evaluation Complete: Patient performs supine to/from sit transfers with CGA/min A, sit to/from stand transfers with CGA, and gait x 200 feet  with CGA with use of FWW. Patient manages device safely, no loss of balance noted. Patient demonstrates decreased jd and reports feeling of lightheadedness during gait training. Patient would benefit from physical therapy to address deficits in functional mobility and LE strength/ROM. Plan to see patient 1-2 additional visits. Patient plans to follow up with outpatient PT at discharge.     Time Calculation:   PT Evaluation Complexity  History, PT Evaluation Complexity: 1-2 personal factors and/or comorbidities  Examination of Body Systems (PT Eval Complexity): 1-2 elements  Clinical Presentation (PT Evaluation Complexity): stable  Clinical Decision Making (PT Evaluation Complexity): low complexity  Overall Complexity (PT Evaluation Complexity): low complexity     PT Charges       Row Name 07/23/24 1433             Time Calculation    Start Time 1355  -BP      Stop Time 1419  -BP      Time Calculation (min) 24 min  -BP      PT Received On 07/23/24  -BP      PT - Next Appointment 07/24/24  -BP                User Key  (r) = Recorded By, (t) = Taken By, (c) = Cosigned By      Initials Name Provider Type    Divya Escalera, PT Physical Therapist                  Therapy Charges for Today       Code Description Service Date Service Provider Modifiers Qty    [id number]  PT EVAL LOW COMPLEXITY 2 7/23/2024 Divya Reed, PT GP 1            PT G-Codes  Outcome Measure Options: AM-PAC 6 Clicks Basic Mobility (PT)  AM-PAC 6 Clicks Score (PT): 18  AM-PAC 6 Clicks Score (OT): 21  PT Discharge Summary  Anticipated Discharge Disposition (PT): home with outpatient therapy services    Divya Reed PT  7/23/2024

## 2024-07-23 NOTE — INTERVAL H&P NOTE
H&P reviewed. The patient was examined and there are no changes to the H&P.    Vitals:    07/23/24 0714 07/23/24 0718   BP:  109/68   BP Location:  Right arm   Patient Position:  Lying   Pulse:  72   Resp:  20   Temp: 98.3 °F (36.8 °C)    TempSrc: Oral    SpO2:  94%   Weight: 120 kg (264 lb 9.6 oz)        
Attending Attestation (For Attendings USE Only)...

## 2024-07-23 NOTE — ANESTHESIA PROCEDURE NOTES
Spinal Block    Pre-sedation assessment completed: 7/23/2024 8:49 AM    Patient reassessed immediately prior to procedure    Patient location during procedure: pre-op  Start Time: 7/23/2024 8:50 AM  Stop Time: 7/23/2024 8:52 AM  Indication:at surgeon's request and post-op pain management  Performed By  CRNA/CAA: Adela Nicolas CRNASRNA: Herman Jefferson SRNA  Preanesthetic Checklist  Completed: patient identified, IV checked, site marked, risks and benefits discussed, surgical consent, monitors and equipment checked, pre-op evaluation and timeout performed  Spinal Block Prep:  Patient Position:sitting  Sterile Tech:cap, gloves, mask and sterile barriers  Prep:Chloraprep  Patient Monitoring:blood pressure monitoring, continuous pulse oximetry and EKG    Spinal Block Procedure  Approach:midline  Guidance:landmark technique and palpation technique  Location:L3-L4  Needle Type:Sprotte  Needle Gauge:25 G  Placement of Spinal needle event:cerebrospinal fluid aspirated  Paresthesia: no  Fluid Appearance:clear  Medications: bupivacaine (MARCAINE) injection 0.5% - Injection   2.2 mL - 7/23/2024 8:52:00 AM   Post Assessment  Patient Tolerance:patient tolerated the procedure well with no apparent complications  Complications no

## 2024-07-24 ENCOUNTER — READMISSION MANAGEMENT (OUTPATIENT)
Dept: CALL CENTER | Facility: HOSPITAL | Age: 69
End: 2024-07-24
Payer: COMMERCIAL

## 2024-07-24 VITALS
TEMPERATURE: 97.8 F | RESPIRATION RATE: 16 BRPM | WEIGHT: 274.69 LBS | HEIGHT: 71 IN | DIASTOLIC BLOOD PRESSURE: 57 MMHG | SYSTOLIC BLOOD PRESSURE: 107 MMHG | BODY MASS INDEX: 38.46 KG/M2 | HEART RATE: 71 BPM | OXYGEN SATURATION: 97 %

## 2024-07-24 PROBLEM — R20.2 NUMBNESS AND TINGLING OF RIGHT LEG: Status: RESOLVED | Noted: 2023-06-23 | Resolved: 2024-07-24

## 2024-07-24 PROBLEM — R20.0 NUMBNESS AND TINGLING OF RIGHT LEG: Status: RESOLVED | Noted: 2023-06-23 | Resolved: 2024-07-24

## 2024-07-24 LAB
ANION GAP SERPL CALCULATED.3IONS-SCNC: 10.3 MMOL/L (ref 5–15)
BASOPHILS # BLD AUTO: 0.05 10*3/MM3 (ref 0–0.2)
BASOPHILS NFR BLD AUTO: 0.6 % (ref 0–1.5)
BUN SERPL-MCNC: 21 MG/DL (ref 8–23)
BUN/CREAT SERPL: 18.3 (ref 7–25)
CALCIUM SPEC-SCNC: 8.4 MG/DL (ref 8.6–10.5)
CHLORIDE SERPL-SCNC: 101 MMOL/L (ref 98–107)
CO2 SERPL-SCNC: 22.7 MMOL/L (ref 22–29)
CREAT SERPL-MCNC: 1.15 MG/DL (ref 0.76–1.27)
DEPRECATED RDW RBC AUTO: 43.1 FL (ref 37–54)
EGFRCR SERPLBLD CKD-EPI 2021: 68.5 ML/MIN/1.73
EOSINOPHIL # BLD AUTO: 0.08 10*3/MM3 (ref 0–0.4)
EOSINOPHIL NFR BLD AUTO: 0.9 % (ref 0.3–6.2)
ERYTHROCYTE [DISTWIDTH] IN BLOOD BY AUTOMATED COUNT: 13.2 % (ref 12.3–15.4)
GLUCOSE SERPL-MCNC: 119 MG/DL (ref 65–99)
HCT VFR BLD AUTO: 37.5 % (ref 37.5–51)
HGB BLD-MCNC: 12.4 G/DL (ref 13–17.7)
IMM GRANULOCYTES # BLD AUTO: 0.04 10*3/MM3 (ref 0–0.05)
IMM GRANULOCYTES NFR BLD AUTO: 0.5 % (ref 0–0.5)
LYMPHOCYTES # BLD AUTO: 1.74 10*3/MM3 (ref 0.7–3.1)
LYMPHOCYTES NFR BLD AUTO: 20.4 % (ref 19.6–45.3)
MCH RBC QN AUTO: 29.6 PG (ref 26.6–33)
MCHC RBC AUTO-ENTMCNC: 33.1 G/DL (ref 31.5–35.7)
MCV RBC AUTO: 89.5 FL (ref 79–97)
MONOCYTES # BLD AUTO: 1.02 10*3/MM3 (ref 0.1–0.9)
MONOCYTES NFR BLD AUTO: 12 % (ref 5–12)
NEUTROPHILS NFR BLD AUTO: 5.6 10*3/MM3 (ref 1.7–7)
NEUTROPHILS NFR BLD AUTO: 65.6 % (ref 42.7–76)
NRBC BLD AUTO-RTO: 0 /100 WBC (ref 0–0.2)
PLATELET # BLD AUTO: 210 10*3/MM3 (ref 140–450)
PMV BLD AUTO: 10.9 FL (ref 6–12)
POTASSIUM SERPL-SCNC: 3.4 MMOL/L (ref 3.5–5.2)
RBC # BLD AUTO: 4.19 10*6/MM3 (ref 4.14–5.8)
SODIUM SERPL-SCNC: 134 MMOL/L (ref 136–145)
WBC NRBC COR # BLD AUTO: 8.53 10*3/MM3 (ref 3.4–10.8)

## 2024-07-24 PROCEDURE — G0378 HOSPITAL OBSERVATION PER HR: HCPCS

## 2024-07-24 PROCEDURE — 97110 THERAPEUTIC EXERCISES: CPT

## 2024-07-24 PROCEDURE — 80048 BASIC METABOLIC PNL TOTAL CA: CPT | Performed by: ORTHOPAEDIC SURGERY

## 2024-07-24 PROCEDURE — 85025 COMPLETE CBC W/AUTO DIFF WBC: CPT | Performed by: ORTHOPAEDIC SURGERY

## 2024-07-24 PROCEDURE — 97116 GAIT TRAINING THERAPY: CPT

## 2024-07-24 PROCEDURE — 94799 UNLISTED PULMONARY SVC/PX: CPT

## 2024-07-24 RX ORDER — ONDANSETRON 4 MG/1
4 TABLET, ORALLY DISINTEGRATING ORAL EVERY 8 HOURS PRN
Qty: 20 TABLET | Refills: 0 | Status: SHIPPED | OUTPATIENT
Start: 2024-07-24

## 2024-07-24 RX ORDER — PREGABALIN 75 MG/1
75 CAPSULE ORAL EVERY 12 HOURS SCHEDULED
Qty: 60 CAPSULE | Refills: 0 | Status: SHIPPED | OUTPATIENT
Start: 2024-07-24

## 2024-07-24 RX ORDER — DOCUSATE SODIUM 100 MG/1
100 CAPSULE, LIQUID FILLED ORAL 2 TIMES DAILY PRN
Qty: 60 CAPSULE | Refills: 0 | Status: SHIPPED | OUTPATIENT
Start: 2024-07-24

## 2024-07-24 RX ORDER — OXYCODONE HYDROCHLORIDE AND ACETAMINOPHEN 5; 325 MG/1; MG/1
1 TABLET ORAL EVERY 4 HOURS PRN
Qty: 42 TABLET | Refills: 0 | Status: SHIPPED | OUTPATIENT
Start: 2024-07-24

## 2024-07-24 RX ORDER — ASPIRIN 81 MG/1
81 TABLET ORAL EVERY 12 HOURS SCHEDULED
Qty: 60 TABLET | Refills: 0 | Status: SHIPPED | OUTPATIENT
Start: 2024-07-24

## 2024-07-24 RX ADMIN — TAMSULOSIN HYDROCHLORIDE 0.8 MG: 0.4 CAPSULE ORAL at 08:31

## 2024-07-24 RX ADMIN — ACETAMINOPHEN 1000 MG: 500 TABLET ORAL at 08:31

## 2024-07-24 RX ADMIN — OXYCODONE HYDROCHLORIDE 5 MG: 5 TABLET ORAL at 06:22

## 2024-07-24 RX ADMIN — PANTOPRAZOLE SODIUM 40 MG: 40 TABLET, DELAYED RELEASE ORAL at 06:22

## 2024-07-24 RX ADMIN — FINASTERIDE 5 MG: 5 TABLET, FILM COATED ORAL at 08:31

## 2024-07-24 RX ADMIN — ASPIRIN 81 MG: 81 TABLET, COATED ORAL at 08:31

## 2024-07-24 RX ADMIN — OXYCODONE HYDROCHLORIDE 10 MG: 5 TABLET ORAL at 10:40

## 2024-07-24 RX ADMIN — MELOXICAM 15 MG: 7.5 TABLET ORAL at 08:31

## 2024-07-24 RX ADMIN — PREGABALIN 75 MG: 75 CAPSULE ORAL at 08:30

## 2024-07-24 NOTE — NURSING NOTE
Notified surgeon of patient being hypotensive, no other symptoms noted, no new orders at this point, cont to monitor

## 2024-07-24 NOTE — PROGRESS NOTES
POD# 1 s/p left TKA    Subjective:Patient states pain fairly well-controlled overnight, he has had some instances of hypotension however not symptomatic.  Denies fevers chills or sweats overnight, denies any residual numbness or tingling to operative extremity.  No calf pain or swelling.    Objective:  Vitals:    07/23/24 2125 07/23/24 2326 07/24/24 0441 07/24/24 0724   BP:  (!) 87/53 93/49 99/58   BP Location:  Left arm Left arm Left arm   Patient Position:  Lying Lying Lying   Pulse: 64 62 64 58   Resp: 18 16 16 16   Temp:  97.1 °F (36.2 °C) 97.3 °F (36.3 °C) 98.4 °F (36.9 °C)   TempSrc:  Oral Oral Oral   SpO2: 97% 94% 97% 95%   Weight:       Height:           Results from last 7 days   Lab Units 07/24/24  0645   WBC 10*3/mm3 8.53   HEMOGLOBIN g/dL 12.4*   HEMATOCRIT % 37.5   PLATELETS 10*3/mm3 210       Results from last 7 days   Lab Units 07/24/24  0645   SODIUM mmol/L 134*   POTASSIUM mmol/L 3.4*   CHLORIDE mmol/L 101   CO2 mmol/L 22.7   BUN mg/dL 21   CREATININE mg/dL 1.15   GLUCOSE mg/dL 119*   CALCIUM mg/dL 8.4*       Exam:    Left knee- dressing clean, dry, intact   Flex and extend toes and ankle   No calf pain, negative Homans sign   Positive sensation light touch left foot   Brisk cap refill all digits, palpable dorsalis pedis pulse    Impression: s/p left TKA    Plan:  1. PT/OT- weight-bear as tolerated, ambulation, range of motion  2. Pain control-  oxycodone, Tylenol, Lyrica  3. DVT prophylaxis-  aspirin twice a day  4. Wound care-leave mesh and glue in place until follow-up visit  5. Disposition- home with outpatient PT once medically stable and cleared by PT

## 2024-07-24 NOTE — PLAN OF CARE
Goal Outcome Evaluation:  Plan of Care Reviewed With: patient           Outcome Evaluation: PT: Patient performs supine to sit transfer with s upervision, sit to/from stand transfers with supervision, and gait x 200 feet with supervision with use of FWW. Patient demonstrates safe use of device without loss of balance. Demonstrates antalgic gait with decreased jd due to increased quad pain. Patient ascends/descends 4 stairs without handrails with SBA. Patient performed L LE TKA HEP x 10 reps, provided and reviewed written handout. Patient has no concerns for return home and will follow up with outpatient PT at discharge. No further inpatient skilled PT needs at this time, anticipate return home today.      Anticipated Discharge Disposition (PT): home with outpatient therapy services

## 2024-07-24 NOTE — PROGRESS NOTES
Patient: Nabor Muhammad Dr.  Procedure(s):  TOTAL KNEE ARTHROPLASTY WITH TYRELL ROBOT  Anesthesia type: regional, spinal, ERAS Protocol, MAC    Patient location: [facility]  Last vitals:   Vitals:    07/24/24 0724   BP: 99/58   Pulse: 58   Resp: 16   Temp: 98.4 °F (36.9 °C)   SpO2: 95%     Level of consciousness: awake, alert, and oriented    Post-anesthesia pain: adequate analgesia  Airway patency: patent  Respiratory: unassisted  Cardiovascular: stable and blood pressure at baseline  Hydration: euvolemic    Anesthetic complications: no

## 2024-07-24 NOTE — DISCHARGE SUMMARY
" Orthopedic Discharge Summary      Patient: Nabor Muhammad Dr.      YOB: 1955    Medical Record Number: [id number]    Attending Physician: Terry Zarate MD  Consulting Physician(s):   Date of Admission: 7/23/2024  7:06 AM  Date of Discharge: 7/24/2024        Primary osteoarthritis of left knee    S/P total knee arthroplasty     Status Post: AL ARTHRP KNE CONDYLE&PLATU MEDIAL&LAT COMPARTMENTS [90754] (TOTAL KNEE ARTHROPLASTY WITH TYRELL ROBOT)      Allergies   Allergen Reactions    Codeine GI Intolerance       Current Medications:     Discharge Medications        New Medications        Instructions Start Date   Aspirin Adult Low Strength 81 MG EC tablet  Generic drug: aspirin   81 mg, Oral, Every 12 Hours Scheduled      BUPIVACAINE 0.125% IN 0.9% SODIUM CHLORIDE 400 ML ELASTOMERIC PUMP \"PAIN BALL\"   8 mL/hr (8 mL/hr), Intradermal, Continuous      docusate sodium 100 MG capsule  Commonly known as: Colace   100 mg, Oral, 2 Times Daily PRN      ondansetron ODT 4 MG disintegrating tablet  Commonly known as: ZOFRAN-ODT   4 mg, Oral, Every 8 Hours PRN      oxyCODONE-acetaminophen 5-325 MG per tablet  Commonly known as: PERCOCET   1 tablet, Oral, Every 4 Hours PRN      pregabalin 75 MG capsule  Commonly known as: LYRICA   75 mg, Oral, Every 12 Hours Scheduled             Continue These Medications        Instructions Start Date   Cialis 5 MG tablet  Generic drug: tadalafil   5 mg, Oral, Daily, Used for prostate      finasteride 5 MG tablet  Commonly known as: PROSCAR   5 mg, Oral, Daily      Narcan 4 MG/0.1ML nasal spray  Generic drug: naloxone   Call 911. Don't prime. [place] in 1 nostril for overdose. Repeat in 2-3 minutes in other nostril if no or minimal breathing/responsiveness.      olmesartan-hydrochlorothiazide 40-25 MG per tablet  Commonly known as: BENICAR HCT   1 tablet, Oral, Daily      omeprazole 20 MG capsule  Commonly known as: priLOSEC   20 mg, Oral, Daily      tamsulosin 0.4 MG capsule 24 " hr capsule  Commonly known as: FLOMAX   0.8 mg, Oral, Daily      vitamin D3 125 MCG (5000 UT) capsule capsule   5,000 Units, Oral, Daily             Stop These Medications      ondansetron 4 MG tablet  Commonly known as: Zofran                  Past Medical History:   Diagnosis Date    Arthritis     GERD (gastroesophageal reflux disease)     Hypertension     Lumbosacral disc disease     OA (osteoarthritis)     YANETH (obstructive sleep apnea)     CPAP    Spinal stenosis of lumbar region      Past Surgical History:   Procedure Laterality Date    CHOLECYSTECTOMY      FEMUR CLOSED REDUCTION Left 1970    KNEE ARTHROSCOPY Left 3/5/2024    Procedure: LEFT KNEE ARTHROSCOPY WITH OPEN ARTHROTOMY SUBCHONDROPLASTY, partial medial and lateral meniscectomy;  Surgeon: Terry Zarate MD;  Location: [facility] OR;  Service: Orthopedics;  Laterality: Left;    KNEE MENISCECTOMY Right 7/21/2021    Procedure: KNEE PARTIAL MEDIAL MENISCECTOMY,;  Surgeon: Terry Zarate MD;  Location: [facility] OR;  Service: Orthopedics;  Laterality: Right;    SHOULDER SURGERY Right 1998    TONSILLECTOMY AND ADENOIDECTOMY      WRIST GANGLION EXCISION Bilateral     R-1990 L-1996     Social History     Occupational History    Not on file   Tobacco Use    Smoking status: Never     Passive exposure: Never    Smokeless tobacco: Never   Vaping Use    Vaping status: Never Used   Substance and Sexual Activity    Alcohol use: Yes     Comment: occ    Drug use: No    Sexual activity: Defer      Social History     Social History Narrative    Not on file     Family History   Problem Relation Age of Onset    Breast cancer Mother     COPD Father     Malig Hyperthermia Neg Hx          Physical Exam: 68 y.o. male  General Appearance:    Alert, cooperative, in no acute distress                      Vitals:    07/23/24 2326 07/24/24 0441 07/24/24 0724 07/24/24 1110   BP: (!) 87/53 93/49 99/58 107/57   BP Location: Left arm Left arm Left arm Right arm   Patient Position:  Lying Lying Lying Lying   Pulse: 62 64 58 71   Resp: 16 16 16 16   Temp: 97.1 °F (36.2 °C) 97.3 °F (36.3 °C) 98.4 °F (36.9 °C) 97.8 °F (36.6 °C)   TempSrc: Oral Oral Oral Oral   SpO2: 94% 97% 95% 97%   Weight:       Height:            Head:    Normocephalic, without obvious abnormality, atraumatic   Eyes:            Lids and lashes normal, conjunctivae and sclerae normal, no   icterus, no pallor, corneas clear, PERRLA   Ears:    Ears appear intact with no abnormalities noted   Throat:   No oral lesions, no thrush, oral mucosa moist   Neck:   No adenopathy, supple, trachea midline, no thyromegaly, no    carotid bruit, no JVD   Back:     No kyphosis present, no scoliosis present, no skin lesions,       erythema or scars, no tenderness to percussion or                   palpation,   range of motion normal   Lungs:     Clear to auscultation,respirations regular, even and                   unlabored    Heart:    Regular rhythm and normal rate, normal S1 and S2, no            murmur, no gallop, no rub, no click   Chest Wall:    No abnormalities observed   Abdomen:     Normal bowel sounds, no masses, no organomegaly, soft        non-tender, non-distended, no guarding, no rebound                 tenderness   Rectal:     Deferred   Extremities:   Incision intact without signs or symptoms of infection.               Neurovascular status remains intact to operative extremity.      Moves all extremities well, no edema, no cyanosis, no              redness   Pulses:   Pulses palpable and equal bilaterally   Skin:   No bleeding, bruising or rash   Lymph nodes:   No palpable adenopathy   Neurologic:   Cranial nerves 2 - 12 grossly intact, sensation intact, DTR        present and equal bilaterally           Hospital Course:  68 y.o. male admitted to [facility] to services of Terry Zarate MD with Primary osteoarthritis of left knee [M17.12]  S/P total knee arthroplasty [Z96.659] on 7/23/2024 and underwent DE ARTHRP IDA  CONDYLE&PLATU MEDIAL&LAT COMPARTMENTS [95409] (TOTAL KNEE ARTHROPLASTY WITH TYRELL ROBOT)  Per Terry Zarate MD. Antibiotic and VTE prophylaxis were per [facility]IP protocols. Post-operatively the patient transferred to the post-operative floor where the patient underwent mobilization therapy that included active as well as passive ROM exercises. Opioids were titrated to achieve appropriate pain management to allow for participation in mobilization exercises. Vital signs are now stable. The incision is intact without signs or symptoms of infection. Operative extremity neurovascular status remains intact.   Appropriate education re: incision care, activity levels, medications, and follow up visits was completed and all questions were answered. The patient is now deemed stable for discharge to Home.      DIAGNOSTIC TESTS:     Admission on 07/23/2024   Component Date Value Ref Range Status    Glucose 07/24/2024 119 (H)  65 - 99 mg/dL Final    BUN 07/24/2024 21  8 - 23 mg/dL Final    Creatinine 07/24/2024 1.15  0.76 - 1.27 mg/dL Final    Sodium 07/24/2024 134 (L)  136 - 145 mmol/L Final    Potassium 07/24/2024 3.4 (L)  3.5 - 5.2 mmol/L Final    Chloride 07/24/2024 101  98 - 107 mmol/L Final    CO2 07/24/2024 22.7  22.0 - 29.0 mmol/L Final    Calcium 07/24/2024 8.4 (L)  8.6 - 10.5 mg/dL Final    BUN/Creatinine Ratio 07/24/2024 18.3  7.0 - 25.0 Final    Anion Gap 07/24/2024 10.3  5.0 - 15.0 mmol/L Final    eGFR 07/24/2024 68.5  >60.0 mL/min/1.73 Final    WBC 07/24/2024 8.53  3.40 - 10.80 10*3/mm3 Final    RBC 07/24/2024 4.19  4.14 - 5.80 10*6/mm3 Final    Hemoglobin 07/24/2024 12.4 (L)  13.0 - 17.7 g/dL Final    Hematocrit 07/24/2024 37.5  37.5 - 51.0 % Final    MCV 07/24/2024 89.5  79.0 - 97.0 fL Final    MCH 07/24/2024 29.6  26.6 - 33.0 pg Final    MCHC 07/24/2024 33.1  31.5 - 35.7 g/dL Final    RDW 07/24/2024 13.2  12.3 - 15.4 % Final    RDW-SD 07/24/2024 43.1  37.0 - 54.0 fl Final    MPV 07/24/2024 10.9  6.0 - 12.0 fL  Final    Platelets 07/24/2024 210  140 - 450 10*3/mm3 Final    Neutrophil % 07/24/2024 65.6  42.7 - 76.0 % Final    Lymphocyte % 07/24/2024 20.4  19.6 - 45.3 % Final    Monocyte % 07/24/2024 12.0  5.0 - 12.0 % Final    Eosinophil % 07/24/2024 0.9  0.3 - 6.2 % Final    Basophil % 07/24/2024 0.6  0.0 - 1.5 % Final    Immature Grans % 07/24/2024 0.5  0.0 - 0.5 % Final    Neutrophils, Absolute 07/24/2024 5.60  1.70 - 7.00 10*3/mm3 Final    Lymphocytes, Absolute 07/24/2024 1.74  0.70 - 3.10 10*3/mm3 Final    Monocytes, Absolute 07/24/2024 1.02 (H)  0.10 - 0.90 10*3/mm3 Final    Eosinophils, Absolute 07/24/2024 0.08  0.00 - 0.40 10*3/mm3 Final    Basophils, Absolute 07/24/2024 0.05  0.00 - 0.20 10*3/mm3 Final    Immature Grans, Absolute 07/24/2024 0.04  0.00 - 0.05 10*3/mm3 Final    nRBC 07/24/2024 0.0  0.0 - 0.2 /100 WBC Final       No results found.    Discharge and Follow up Instructions:   Weightbearing as tolerated left lower extremity  Outpatient PT  Aspirin for DVT prophylaxis  Hold blood pressure medication until blood pressure returns to within normal limits  Follow-up in Ortho office 2 weeks previously scheduled appointment    Date: 7/24/2024    JASSI [place]

## 2024-07-24 NOTE — THERAPY DISCHARGE NOTE
Acute Care - Physical Therapy Treatment Note/Discharge   Jody Olvera     Patient Name: Nabor Muhammad Dr.  : 1955  MRN: [id number]  Today's Date: 2024                Admit Date: 2024    Visit Dx:    ICD-10-CM ICD-9-CM   1. Primary osteoarthritis of left knee  M17.12 715.16     Patient Active Problem List   Diagnosis    Complex tear of medial meniscus of left knee as current injury    Chondromalacia, knee    Benign fibroma of prostate    Cellulitis    Eunuchoidism    Obstructive apnea    Excess weight    Derangement of joint    Obstructive sleep apnea    Primary osteoarthritis of left knee    Mechanical knee pain, left    Complex tear of medial meniscus of right knee as current injury    Status post arthroscopic knee surgery    Pes anserinus bursitis of left knee    DDD (degenerative disc disease), lumbar    Spinal stenosis of lumbar region with neurogenic claudication    Numbness and tingling of right leg    Insufficiency fracture of tibia    Closed subchondral insufficiency fracture of condyle of left femur    Pre-op examination    S/P total knee arthroplasty     Past Medical History:   Diagnosis Date    Arthritis     GERD (gastroesophageal reflux disease)     Hypertension     Lumbosacral disc disease     OA (osteoarthritis)     YANETH (obstructive sleep apnea)     CPAP    Spinal stenosis of lumbar region      Past Surgical History:   Procedure Laterality Date    CHOLECYSTECTOMY      FEMUR CLOSED REDUCTION Left     KNEE ARTHROSCOPY Left 3/5/2024    Procedure: LEFT KNEE ARTHROSCOPY WITH OPEN ARTHROTOMY SUBCHONDROPLASTY, partial medial and lateral meniscectomy;  Surgeon: Terry Zarate MD;  Location: [facility] OR;  Service: Orthopedics;  Laterality: Left;    KNEE MENISCECTOMY Right 2021    Procedure: KNEE PARTIAL MEDIAL MENISCECTOMY,;  Surgeon: Terry Zarate MD;  Location:  [facility] OR;  Service: Orthopedics;  Laterality: Right;    SHOULDER SURGERY Right     TONSILLECTOMY AND  ADENOIDECTOMY      WRIST GANGLION EXCISION Bilateral     R-1990 L-1996       PT Assessment (Last 12 Hours)       PT Evaluation and Treatment       Row Name 07/24/24 0805          Physical Therapy Time and Intention    Subjective Information complains of;pain  -BP     Document Type therapy note (daily note)  -BP     Mode of Treatment physical therapy  -BP     Patient Effort good  -BP     Symptoms Noted During/After Treatment increased pain  -BP       Row Name 07/24/24 0805          General Information    Patient Profile Reviewed yes  -BP     Patient/Family/Caregiver Comments/Observations Patient supine in bed with HOB elevated. Patient complains of significant quad pain. Agreeable to PT treatment.  -BP     Existing Precautions/Restrictions fall  brace no longer indicated  -BP     Risks Reviewed patient:;LOB;increased discomfort  -BP     Benefits Reviewed patient:;improve function;increase independence;increase strength  -BP     Barriers to Rehab none identified  -BP       Row Name 07/24/24 0805          Pain    Pre/Posttreatment Pain Comment patient complains of significant quad pain from tourniquet. Does not rate.  -BP       Row Name 07/24/24 0805          Cognition    Personal Safety Interventions gait belt;nonskid shoes/slippers when out of bed  -BP       Row Name 07/24/24 0805          Bed Mobility    Bed Mobility supine-sit  -BP     Supine-Sit [employer] (Bed Mobility) supervision  -BP     Assistive Device (Bed Mobility) head of bed elevated;bed rails  -BP       Row Name 07/24/24 0805          Transfers    Transfers stand-sit transfer;sit-stand transfer  -BP     Comment, (Transfers) verbal cues for hand placement  -BP       Row Name 07/24/24 0805          Sit-Stand Transfer    Sit-Stand [employer] (Transfers) supervision;verbal cues  -BP     Assistive Device (Sit-Stand Transfers) walker, front-wheeled  -BP       Row Name 07/24/24 0805          Stand-Sit Transfer    Stand-Sit [employer] (Transfers)  supervision  -BP     Assistive Device (Stand-Sit Transfers) walker, front-wheeled  -BP       Row Name 07/24/24 0805          Gait/Stairs (Locomotion)    [place] Level (Gait) supervision  -BP     Assistive Device (Gait) walker, front-wheeled  -BP     Distance in Feet (Gait) 150  -BP     Pattern (Gait) swing-through  -BP     Deviations/Abnormal Patterns (Gait) stride length decreased;jd decreased  -BP     Bilateral Gait Deviations forward flexed posture  -BP     [place] Level (Stairs) stand by assist  -BP     Number of Steps (Stairs) 4  -BP     Comment, (Gait/Stairs) Patient manages device safely, no loss of balance noted. Patient demonstrates antalgic gait with decreased jd due to quad pain. Patient ascends/descends stairs safely without need for cues for sequencing.  -BP       Row Name 07/24/24 0805          Safety Issues, Functional Mobility    Comment, Safety Issues/Impairments (Mobility) WFL  -BP       Row Name 07/24/24 0805          Balance    Comment, Balance sitting balance-independent. Standing balanace-supervision with use of FWW.  -BP       Row Name 07/24/24 0805          Motor Skills    Therapeutic Exercise --  L LE TKA HEP x 10 reps, provided and reviewed written handout.  -BP       Row Name             Wound 07/23/24 0953 Left knee Incision    Wound - Properties Group Placement Date: 07/23/24  - Placement Time: 0953 -JF Present on Original Admission: N  -JF Side: Left  -JF Location: knee  -JF Primary Wound Type: Incision  -JF Additional Comments: exofin, telfa, ABD, webril, ace, immobilizer, ice pack  -JF    Retired Wound - Properties Group Placement Date: 07/23/24  -JF Placement Time: 0953 -JF Present on Original Admission: N  -JF Side: Left  -JF Location: knee  -JF Primary Wound Type: Incision  -JF Additional Comments: exofin, telfa, ABD, webril, ace, immobilizer, ice pack  -JF    Retired Wound - Properties Group Date first assessed: 07/23/24  - Time first assessed: 0953 -JF  Present on Original Admission: N  -JF Side: Left  -JF Location: knee  -JF Primary Wound Type: Incision  -JF Additional Comments: exofin, telfa, ABD, webril, ace, immobilizer, ice pack  -      Row Name 07/24/24 0805          Plan of Care Review    Plan of Care Reviewed With patient  -BP     Outcome Evaluation PT: Patient performs supine to sit transfer with s upervision, sit to/from stand transfers with supervision, and gait x 200 feet with supervision with use of FWW. Patient demonstrates safe use of device without loss of balance. Demonstrates antalgic gait with decreased jd due to increased quad pain. Patient ascends/descends 4 stairs without handrails with SBA. Patient performed L LE TKA HEP x 10 reps, provided and reviewed written handout. Patient has no concerns for return home and will follow up with outpatient PT at discharge. No further inpatient skilled PT needs at this time, anticipate return home today.  -       Row Name 07/24/24 0805          Positioning and Restraints    Pre-Treatment Position in bed  -BP     Post Treatment Position chair  -BP     In Chair notified nsg;reclined;call light within reach;encouraged to call for assist;with nsg  -       Row Name 07/24/24 0805          Progress Summary (PT)    Progress Toward Functional Goals (PT) prepare for discharge  -       Row Name 07/24/24 0805          Therapy Plan Review/Discharge Plan (PT)    Therapy Plan Review (PT) patient;risks/benefits reviewed;participants included  -               User Key  (r) = Recorded By, (t) = Taken By, (c) = Cosigned By      Initials Name Provider Type    Robb Horn, RN Registered Nurse    Divya Escalera, PT Physical Therapist                      Physical Therapy Education       Title: PT OT SLP Therapies (Done)       Topic: Physical Therapy (Done)       Point: Mobility training (Done)       Learning Progress Summary             Patient Acceptance, E,TB, VU by  at 7/24/2024 0902     Acceptance, E,TB, VU by  at 7/23/2024 1432                         Point: Home exercise program (Done)       Learning Progress Summary             Patient Acceptance, E,TB, VU by  at 7/24/2024 0902                                         User Key       Initials Effective Dates Name Provider Type Discipline     06/16/21 -  Divya Reed, PT Physical Therapist PT                    PT Recommendation and Plan  Anticipated Discharge Disposition (PT): home with outpatient therapy services  Planned Therapy Interventions (PT): balance training, bed mobility training, gait training, home exercise program, patient/family education, transfer training, stretching, strengthening, stair training, ROM (range of motion)  Therapy Frequency (PT): other (see comments) (1-2 visits)  Progress Summary (PT)  Progress Toward Functional Goals (PT): prepare for discharge  Plan of Care Reviewed With: patient  Outcome Evaluation: PT: Patient performs supine to sit transfer with s upervision, sit to/from stand transfers with supervision, and gait x 200 feet with supervision with use of FWW. Patient demonstrates safe use of device without loss of balance. Demonstrates antalgic gait with decreased jd due to increased quad pain. Patient ascends/descends 4 stairs without handrails with SBA. Patient performed L LE TKA HEP x 10 reps, provided and reviewed written handout. Patient has no concerns for return home and will follow up with outpatient PT at discharge. No further inpatient skilled PT needs at this time, anticipate return home today.     Outcome Measures       Row Name 07/24/24 0805             How much help from another person do you currently need...    Turning from your back to your side while in flat bed without using bedrails? 4  -BP      Moving from lying on back to sitting on the side of a flat bed without bedrails? 3  -BP      Moving to and from a bed to a chair (including a wheelchair)? 3  -BP      Standing up from a  chair using your arms (e.g., wheelchair, bedside chair)? 3  -BP      Climbing 3-5 steps with a railing? 3  -BP      To walk in hospital room? 3  -BP      AM-PAC 6 Clicks Score (PT) 19  -BP      Highest Level of Mobility Goal 6 --> Walk 10 steps or more  -BP         Functional Assessment    Outcome Measure Options AM-PAC 6 Clicks Basic Mobility (PT)  -BP                User Key  (r) = Recorded By, (t) = Taken By, (c) = Cosigned By      Initials Name Provider Type    Divya Escalera, PT Physical Therapist                     Time Calculation:    PT Charges       Row Name 07/24/24 0904             Time Calculation    Start Time 0805  -BP      Stop Time 0831  -BP      Time Calculation (min) 26 min  -BP      PT Received On 07/24/24  -BP         Timed Charges    23940 - PT Therapeutic Exercise Minutes 10  -BP      09848 - Gait Training Minutes  16  -BP         Total Minutes    Timed Charges Total Minutes 26  -BP       Total Minutes 26  -BP                User Key  (r) = Recorded By, (t) = Taken By, (c) = Cosigned By      Initials Name Provider Type    Divya Escalera, PT Physical Therapist                  Therapy Charges for Today       Code Description Service Date Service Provider Modifiers Qty    85029246084 HC PT EVAL LOW COMPLEXITY 2 7/23/2024 Divya Reed, PT GP 1    93490477949 HC PT THER PROC EA 15 MIN 7/24/2024 Divya Reed, PT GP 1    53928121846 HC GAIT TRAINING EA 15 MIN 7/24/2024 Divya Reed, PT GP 1            PT G-Codes  Outcome Measure Options: AM-PAC 6 Clicks Basic Mobility (PT)  AM-PAC 6 Clicks Score (PT): 19  AM-PAC 6 Clicks Score (OT): 21    PT Discharge Summary  Anticipated Discharge Disposition (PT): home with outpatient therapy services  Reason for Discharge: Discharge from facility, All goals achieved  Outcomes Achieved: Patient able to partially acheive established goals  Discharge Destination: Home with outpatient services    Divya Reed, MAHAMED  7/24/2024

## 2024-07-24 NOTE — PLAN OF CARE
Goal Outcome Evaluation:  Plan of Care Reviewed With: patient        Progress: improving  Outcome Evaluation: Hypotensive, otherwise VSS, cpap during sleep, rested well, ambulated in [place], good pain control with PO meds, anticipates going home today

## 2024-07-24 NOTE — CASE MANAGEMENT/SOCIAL WORK
Case Management Discharge Note      Final Note: Discharged home.         Selected Continued Care - Discharged on 7/24/2024 Admission date: 7/23/2024 - Discharge disposition: Home or Self Care      Destination    No services have been selected for the patient.                Durable Medical Equipment       Service Provider Selected Services Address Phone Fax Patient Preferred    [facility] [address], [place] [id number] [phone] --              Dialysis/Infusion    No services have been selected for the patient.                Home Medical Care    No services have been selected for the patient.                Therapy    No services have been selected for the patient.                Community Resources    No services have been selected for the patient.                Community & DME    No services have been selected for the patient.                         Final Discharge Disposition Code: 01 - home or self-care

## 2024-07-24 NOTE — OUTREACH NOTE
Prep Survey      Flowsheet Row Responses   [facility] facility patient discharged from? [place]   Is LACE score < 7 ? Yes   Eligibility [facility] [place]   Date of Admission 07/23/24   Date of Discharge 07/24/24   Discharge Disposition Home or Self Care   Discharge diagnosis Total knee arthroplasty   Does the patient have one of the following disease processes/diagnoses(primary or secondary)? Total Joint Replacement   Does the patient have Home health ordered? No   Is there a DME ordered? Yes   What DME was ordered? [facility] medical for BSC   Prep survey completed? Yes            CHAN CONTRERAS - Registered Nurse

## 2024-07-24 NOTE — CASE MANAGEMENT/SOCIAL WORK
Continued Stay Note  YUMIKO Yates     Patient Name: Nabor Muhammad Dr.  MRN: [id number]  Today's Date: 7/24/2024    Admit Date: 7/23/2024    Plan: plan home with wife/OP PT [facility]   Discharge Plan       Row Name 07/24/24 1058       Plan    Plan plan home with wife/OP PT [facility]    Patient/Family in Agreement with Plan yes    Plan Comments Spoke with patient at bedside, face sheet verified. Patient confirms his wife is able to assist him as needed at home. He is scheduled @ [facility] OP PT 7/25@2pm. He has decided he would like a BSC. BSC delivered to bedside, [employer] paper work completed and faxed. No additional needs noted, anticipate dc home this afternoon. Prasad OBRIEN updated.                   Discharge Codes    No documentation.                 Expected Discharge Date and Time       Expected Discharge Date Expected Discharge Time    Jul 24, 2024               Doe Rodriguez RN

## 2024-07-25 ENCOUNTER — TREATMENT (OUTPATIENT)
Dept: PHYSICAL THERAPY | Facility: CLINIC | Age: 69
End: 2024-07-25
Payer: COMMERCIAL

## 2024-07-25 ENCOUNTER — TRANSITIONAL CARE MANAGEMENT TELEPHONE ENCOUNTER (OUTPATIENT)
Dept: CALL CENTER | Facility: HOSPITAL | Age: 69
End: 2024-07-25
Payer: COMMERCIAL

## 2024-07-25 DIAGNOSIS — Z96.652 S/P TOTAL KNEE ARTHROPLASTY, LEFT: Primary | ICD-10-CM

## 2024-07-25 DIAGNOSIS — M25.562 ACUTE PAIN OF LEFT KNEE: ICD-10-CM

## 2024-07-25 DIAGNOSIS — M25.662 DECREASED ROM OF LEFT KNEE: ICD-10-CM

## 2024-07-25 DIAGNOSIS — R26.2 DIFFICULTY WALKING DUE TO KNEE JOINT: ICD-10-CM

## 2024-07-25 NOTE — OUTREACH NOTE
Call Center TCM Note      Flowsheet Row Responses   [facility] facility patient discharged from? [place]   Does the patient have one of the following disease processes/diagnoses(primary or secondary)? Total Joint Replacement   Joint surgery performed? Knee   TCM attempt successful? No   Unsuccessful attempts Attempt 2            Zina Mchugh RN    7/25/2024, 15:49 EDT

## 2024-07-25 NOTE — PROGRESS NOTES
Physical Therapy Initial Evaluation and Plan of Care    Patient: Nabor Muhammad Dr.   : 1955  Diagnosis/ICD-10 Code:  S/P total knee arthroplasty, left [Z96.652]  Referring practitioner: JASSI Smith  Date of Initial Visit: 2024  Today's Date: 2024  Patient seen for 1 session         Visit Diagnoses:    ICD-10-CM ICD-9-CM   1. S/P total knee arthroplasty, left  Z96.652 V43.65   2. Acute pain of left knee  M25.562 719.46   3. Decreased ROM of left knee  M25.662 719.56   4. Difficulty walking due to knee joint  R26.2 719.7         Subjective Questionnaire: LEFS:       Subjective Evaluation    History of Present Illness  Date of surgery: 2024  Mechanism of injury: Pt is a 67 y/o WM who reports to the clinic S/P left TKR on 2024.  Pt stayed the night in the Hospital at [facility]-went home the next afternoon 2024.  Pt with a Hx of a right knee scope 13 yrs ago from Dr. Statton.  Pt also injured his left knee when he was 14 yrs old-Fx it through the epiphysis-was in a cast and received no PT.   Pt states he has had bad chondromalacia.  Pt has tried to control his knee pain with wearing the Off  brace, cortisone injections, a knee scope in 3/2024, and PRP injections.   Pt continued to have knee pain, so decided to do the TKR surgery.      PMH: HTN-enlarged Prostate-15-17 yrs ago-L2-L3 HNP to the left, now has right leg parethesia spinal stenosis L4  right.  Left knee surgery on 3/5 left knee scope for left partial medial, lateral meniscectomy, Percutaneous treatment left knee medial femoral condyle insufficiency fracture, and Percutaneous treatment left knee medial tibial plateau insufficiency fracture.            Subjective comment: Pt took 2 Percocets this am.  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 Quality of life: good    Pain  Current pain ratin  At worst pain ratin  Location: left anterior quad,  knee joint  Quality: sharp, burning and dull ache (along the incision)  Relieving factors: medications and ice (ice around the clock, baby aspirin, Lyrica, Percocet)  Aggravating factors: movement, ambulation and sleeping (ER makes it, 10 hrs last night)    Hand dominance: right    Diagnostic Tests  X-ray: abnormal (left knee OA)  MRI studies: abnormal (performed 1/5/2024-meniscus tear and severe medial compartment degenerative changes)    Treatments  Previous treatment: physical therapy and injection treatment (off  brace)  Current treatment: medication  Patient Goals  Patient goals for therapy: decreased edema, decreased pain, increased motion, return to work, increased strength, independence with ADLs/IADLs and return to sport/leisure activities             Objective          Observations   Left Knee   Positive for effusion and incision.     Additional Knee Observation Details  Left anterior knee incision-healing well without signs of infection covered with the CRISTOBAL dressing.      Palpation   Left   Tenderness of the rectus femoris, vastus lateralis and vastus medialis.     Additional Palpation Details  Mild point tenderness left distal quad     Tenderness   Left Knee   Tenderness in the lateral joint line and medial joint line.     Additional Tenderness Details  Tenderness at L knee med and lat joint line    Neurological Testing     Sensation     Knee   Left Knee   Diminished: Light touch     Right Knee   Intact: light touch     Comments   Left light touch: to light touch L4-L5 dermatome, L5 states is numb at the knee..     Ankle/Foot   Left Ankle/Foot   Diminished: light touch    Right Ankle/Foot   Intact: light touch     Active Range of Motion   Left Knee   Flexion: 74 degrees   Extension: 6 degrees     Right Knee   Flexion: 124 degrees   Extension: 0 degrees     Patellar Mobility   Left Knee Patellar tendons within functional limits include the medial, lateral, superior and inferior.     Right Knee  Patellar tendons within functional limits include the medial and lateral. Hypomobile in the superior and inferior patellar tendon(s).     Strength/Myotome Testing     Left Hip   Planes of Motion   Flexion: 2+  Abduction: 3  Adduction: 3    Right Hip   Planes of Motion   Flexion: 5  Abduction: 5  Adduction: 5    Left Knee   Flexion: 3  Extension: 2+  Quadriceps contraction: fair    Right Knee   Flexion: 5  Extension: 5  Quadriceps contraction: good    Left Ankle/Foot   Dorsiflexion: 5  Plantar flexion: 5    Right Ankle/Foot   Dorsiflexion: 5  Plantar flexion: 5    Tests   Left Ankle/Foot   Negative for Homans' sign.     Additional Tests Details  NA due to S/P left TKR     Swelling     Left Knee Girth Measurement (cm)   Joint line: 45.3 cm.  10 cm above joint line: sup patella- 50.2 cm.  10 cm below joint line: inf patella- 41.2 cm.    Right Knee Girth Measurement (cm)   Joint line: 43 cm.  10 cm above joint line: sup patella- 48.5 cm.  10 cm below joint line: inf patella- 39.8 cm.    Ambulation     Observational Gait   Gait: antalgic   Decreased walking speed, stride length, left stance time, left swing time and left step length.   Left foot contact pattern: foot flat    Additional Observational Gait Details  Pt enters department WBAT left LE using a single point cane in the left UE.  Educated pt to use cane in his right UE.  Pt ambulated within clinic demonstrating the proper use and sequence with his SPC.          Assessment & Plan       Assessment  Impairments: abnormal gait, abnormal or restricted ROM, activity intolerance, impaired balance, impaired physical strength, lacks appropriate home exercise program, pain with function and weight-bearing intolerance   Functional limitations: sleeping, walking, uncomfortable because of pain, moving in bed, sitting, standing, stooping and unable to perform repetitive tasks   Assessment details: Pt is a 69 y/o WM who reports to the clinic with left knee pain, decreased ROM,  strength, decreased flexibility, tenderness, knee effusion, and decreased functional mobility with ambulation, standing, and steps.  Pt's personal factors of being a MD and needing to return to work  may affect progress in plan of care. Signs and symptoms are consistent with physical therapy diagnosis of S/P left TKR. Patient is appropriate for skilled PT to address impairments and reduce pain in order to improve ease with daily mobility and ADLs.  Pt is educated on RICE, positioning his knee for comfort, placement of pillows, using the cane, course of treatment, and pt was given a HEP.        Prognosis: good    Goals  Plan Goals: STGs: 2-4 weeks  1. Decrease left knee pain to 5/10 with standing and ambulation.    2. Increase left knee AROM 0-115 degrees for improved ability to dress his left LE.    3.  Decrease left medial/lateral knee tenderness to minimal to none for improved ability to perform his ADLs.     4.  Pt ambulates into clinic without an AD minimal antalgic gait left LE for improved balance and functional mobility    5.  Decrease left knee effusion at girth measurements by 1-2 cm for improved ROM and ability to dress his left LE.      LTGs: 5-8 weeks  1.  Pt Independent with HEP for self management of symptoms.  2.  Increase left knee AROM 0-125 degrees for improved ability to get into and out of his car and perform his ADLs.    3.  Increase left knee and hip strength to 5/5 for improved ability to stand, ambulate and climb steps.     4.  Pt ambulates left LE without an antalgic gait for improved mobility and balance to return to work.        Plan  Therapy options: will be seen for skilled therapy services  Planned modality interventions: cryotherapy, electrical stimulation/Russian stimulation and thermotherapy (hydrocollator packs)  Planned therapy interventions: joint mobilization, home exercise program, gait training, flexibility, strengthening, stretching, functional ROM exercises, manual therapy,  neuromuscular re-education, soft tissue mobilization and therapeutic activities  Frequency: 3x week  Duration in weeks: 8  Treatment plan discussed with: patient and caregiver          Timed:         Manual Therapy:         mins  94979;     Therapeutic Exercise:    17     mins  86484;     Neuromuscular Tyrone:        mins  22676;    Therapeutic Activity:    8      mins  13290;     Gait Training:           mins  22313;     Ultrasound:          mins  49105;    Ionto                                   mins   25759  Self Care                            mins   11980  Canalith Repos         mins 76769      Un-Timed:  Electrical Stimulation:         mins  20875 ( );  Dry Needling          mins self-pay  Traction          mins 99865  Low Eval    15      Mins  86072  Mod Eval          Mins  15611  High Eval                            Mins  10018      Timed Treatment:  25    mins   Total Treatment:    50    mins        PT: Graciela Trejo PT     License Number: [id number]  Electronically signed by Graciela Trejo PT, 07/25/24, 1:46 PM EDT    Certification Period: 7/25/2024 thru 10/22/2024  I certify that the therapy services are furnished while this patient is under my care.  The services outlined above are required by this patient, and will be reviewed every 90 days.         Physician Signature:__________________________________________________    PHYSICIAN: Kya Burns APRN  NPI: [id number]                                      DATE:      Please sign and return via fax to [email or web address] . Thank you, [facility].

## 2024-07-25 NOTE — OUTREACH NOTE
Call Center TCM Note      Flowsheet Row Responses   Religion facility patient discharged from? [place]   Does the patient have one of the following disease processes/diagnoses(primary or secondary)? Total Joint Replacement   Joint surgery performed? Knee   TCM attempt successful? No   Unsuccessful attempts Attempt 1            Zina Mchugh RN    7/25/2024, 11:54 EDT

## 2024-07-26 ENCOUNTER — TRANSITIONAL CARE MANAGEMENT TELEPHONE ENCOUNTER (OUTPATIENT)
Dept: CALL CENTER | Facility: HOSPITAL | Age: 69
End: 2024-07-26
Payer: COMMERCIAL

## 2024-07-26 NOTE — OUTREACH NOTE
Call Center TCM Note      Flowsheet Row Responses   Taoism facility patient discharged from? [place]   Does the patient have one of the following disease processes/diagnoses(primary or secondary)? Total Joint Replacement   Joint surgery performed? Knee   TCM attempt successful? No   Unsuccessful attempts Attempt 3            Faith Donald RN    7/26/2024, 12:37 EDT

## 2024-07-29 ENCOUNTER — TREATMENT (OUTPATIENT)
Dept: PHYSICAL THERAPY | Facility: CLINIC | Age: 69
End: 2024-07-29
Payer: COMMERCIAL

## 2024-07-29 DIAGNOSIS — M25.662 DECREASED ROM OF LEFT KNEE: ICD-10-CM

## 2024-07-29 DIAGNOSIS — R26.2 DIFFICULTY WALKING DUE TO KNEE JOINT: ICD-10-CM

## 2024-07-29 DIAGNOSIS — M25.562 ACUTE PAIN OF LEFT KNEE: ICD-10-CM

## 2024-07-29 DIAGNOSIS — Z96.652 S/P TOTAL KNEE ARTHROPLASTY, LEFT: Primary | ICD-10-CM

## 2024-07-29 PROCEDURE — 97014 ELECTRIC STIMULATION THERAPY: CPT | Performed by: PHYSICAL THERAPIST

## 2024-07-29 PROCEDURE — 97530 THERAPEUTIC ACTIVITIES: CPT | Performed by: PHYSICAL THERAPIST

## 2024-07-29 PROCEDURE — 97110 THERAPEUTIC EXERCISES: CPT | Performed by: PHYSICAL THERAPIST

## 2024-07-29 PROCEDURE — 97140 MANUAL THERAPY 1/> REGIONS: CPT | Performed by: PHYSICAL THERAPIST

## 2024-07-29 NOTE — PROGRESS NOTES
Physical Therapy Daily Treatment Note    Patient: Nabor Muhammad Dr.   : 1955  Referring practitioner: JASSI Smith  Date of Initial Visit: Type: THERAPY  Noted: 3/8/2024  Today's Date: 2024  Patient seen for 16 sessions       Visit Diagnoses:    ICD-10-CM ICD-9-CM   1. S/P total knee arthroplasty, left  Z96.652 V43.65   2. Acute pain of left knee  M25.562 719.46   3. Decreased ROM of left knee  M25.662 719.56   4. Difficulty walking due to knee joint  R26.2 719.7       Nabor Muhammad Dr. reports: his knee pain is a 4-5/10 described as a dull toothache.  Pt is only taking his pain meds at night.      Subjective       Objective          Active Range of Motion   Left Knee   Flexion: 81 degrees     Passive Range of Motion   Left Knee   Flexion: 86 degrees       See Exercise, Manual, and Modality Logs for complete treatment.     Pt enters department with a mild antalgic gait left LE without an AD.      Assessment & Plan       Assessment  Assessment details: Pt is responding to treatment well with improving knee AROM, strength, and functional mobility.  Pt continues to c/o left distal quad pain especially with knee flexion.  Pt completed SAQ and hip add/abd without assistance.  Pt completed 10 reps of left SLR without a rest break and only required light assistance from PT to initiate leg lift.  Added knee flex sitting on side of bed AAROM and TB vs hamstring to improve pt's active knee flexion.  Ended treatment with IFC and cold pack to decrease pain and inflammation post exercise.            Progress per Plan of Care      Timed:         Manual Therapy:    8     mins  72841;     Therapeutic Exercise:   21      mins  55617;     Neuromuscular Tyrone:        mins  50819;    Therapeutic Activity:    10      mins  57105;     Gait Training:           mins  59057;     Ultrasound:          mins  90430;    Ionto                                   mins   40511  Self Care                            mins    92409  [facility]         mins 42717      Un-Timed:  Electrical Stimulation:   15      mins  82700 ( );  Dry Needling          mins self-pay  Traction          mins 19017      Timed Treatment:  39    mins   Total Treatment:     59   mins    Graciela Trejo, PT  [place] License: [id number]

## 2024-07-31 ENCOUNTER — TREATMENT (OUTPATIENT)
Dept: PHYSICAL THERAPY | Facility: CLINIC | Age: 69
End: 2024-07-31
Payer: COMMERCIAL

## 2024-07-31 DIAGNOSIS — M25.562 ACUTE PAIN OF LEFT KNEE: ICD-10-CM

## 2024-07-31 DIAGNOSIS — R26.2 DIFFICULTY WALKING DUE TO KNEE JOINT: ICD-10-CM

## 2024-07-31 DIAGNOSIS — Z96.652 S/P TOTAL KNEE ARTHROPLASTY, LEFT: Primary | ICD-10-CM

## 2024-07-31 DIAGNOSIS — M25.662 DECREASED ROM OF LEFT KNEE: ICD-10-CM

## 2024-07-31 NOTE — PROGRESS NOTES
Physical Therapy Daily Treatment Note      Patient: Nabor Muhammad Dr.   : 1955  Referring practitioner: JASSI Smith  Date of Initial Visit: Type: THERAPY  Noted: 2024  Today's Date: 2024  Patient seen for 3 sessions       Visit Diagnoses:    ICD-10-CM ICD-9-CM   1. S/P total knee arthroplasty, left  Z96.652 V43.65   2. Acute pain of left knee  M25.562 719.46   3. Decreased ROM of left knee  M25.662 719.56   4. Difficulty walking due to knee joint  R26.2 719.7       Nabor Muhammad Dr. reports: he did not take any pain meds this am.  Pt still is having left thigh, but now he can lift it on his own.      Subjective       Objective          Active Range of Motion   Left Knee   Flexion: 90 degrees   Extension: 2 degrees       See Exercise, Manual, and Modality Logs for complete treatment.       Assessment & Plan       Assessment  Assessment details: Pt is responding to treatment well with improving knee AROM, strength, and functional mobility.  Pt continues to c/o left distal quad pain, but improving and pt continues to gain more quad control during his exercises.  Pt reached 90 degrees of knee flexion today.  Pt still with moderate knee effusion.  Added forward step ups and TKE today for improved quad strength.   Ended treatment with IFC and cold pack to decrease pain and inflammation post exercise.  Will continue to see pt 3x/week for stretching, strengthening and modalities PRN for pain.            Progress per Plan of Care      Timed:         Manual Therapy:    5     mins  16404;     Therapeutic Exercise:    25     mins  09117;     Neuromuscular Tyrone:        mins  90025;    Therapeutic Activity:    10      mins  29819;     Gait Training:           mins  90311;     Ultrasound:          mins  23524;    Ionto                                   mins   18400  Self Care                            mins   99221  Canalith Repos         mins 91118      Un-Timed:  Electrical Stimulation:   15       mins  32295 ( );  Dry Needling          mins self-pay  Traction          mins 73424      Timed Treatment:   40   mins   Total Treatment:    60    mins    Graciela Trejo, PT  [place] License: [id number]

## 2024-08-02 ENCOUNTER — TREATMENT (OUTPATIENT)
Dept: PHYSICAL THERAPY | Facility: CLINIC | Age: 69
End: 2024-08-02
Payer: COMMERCIAL

## 2024-08-02 DIAGNOSIS — M25.562 ACUTE PAIN OF LEFT KNEE: ICD-10-CM

## 2024-08-02 DIAGNOSIS — M25.662 DECREASED ROM OF LEFT KNEE: ICD-10-CM

## 2024-08-02 DIAGNOSIS — Z96.652 S/P TOTAL KNEE ARTHROPLASTY, LEFT: Primary | ICD-10-CM

## 2024-08-02 DIAGNOSIS — R26.2 DIFFICULTY WALKING DUE TO KNEE JOINT: ICD-10-CM

## 2024-08-02 PROCEDURE — 97530 THERAPEUTIC ACTIVITIES: CPT | Performed by: PHYSICAL THERAPIST

## 2024-08-02 PROCEDURE — 97014 ELECTRIC STIMULATION THERAPY: CPT | Performed by: PHYSICAL THERAPIST

## 2024-08-02 PROCEDURE — 97110 THERAPEUTIC EXERCISES: CPT | Performed by: PHYSICAL THERAPIST

## 2024-08-02 NOTE — PROGRESS NOTES
Physical Therapy Daily Treatment Note    Patient: Nabor Muhammad Dr.   : 1955  Referring practitioner: JASSI Smith  Date of Initial Visit: Type: THERAPY  Noted: 2024  Today's Date: 2024  Patient seen for 4 sessions       Visit Diagnoses:    ICD-10-CM ICD-9-CM   1. S/P total knee arthroplasty, left  Z96.652 V43.65   2. Acute pain of left knee  M25.562 719.46   3. Decreased ROM of left knee  M25.662 719.56   4. Difficulty walking due to knee joint  R26.2 719.7       Nabor Muhammad Dr. reports: his leg is still swollen.  Pt is only taking pain meds at night.  Pt states his quad is sore and tight.      Subjective       Objective          Active Range of Motion   Left Knee   Flexion: 85 degrees     Additional Active Range of Motion Details  AAROM L knee flex: 93 deg      See Exercise, Manual, and Modality Logs for complete treatment.       Assessment & Plan       Assessment  Assessment details: Pt is tolerating treatment well with improving knee AAROM in flexion.  Pt continues to c/o left distal quad pain, but tolerating all stretching and strengthening exercises.  Pt ambulates without an AD mild antalgic gait left LE.  Ended treatment with IFC and cold pack to decrease pain and inflammation post exercise.  Will continue to see pt 3x/week for stretching, strengthening and modalities PRN for pain.          Progress per Plan of Care      Timed:         Manual Therapy:    5     mins  57276;     Therapeutic Exercise:   30      mins  04238;     Neuromuscular Tyrone:        mins  16653;    Therapeutic Activity:    15      mins  17160;     Gait Training:           mins  03784;     Ultrasound:          mins  75115;    Ionto                                   mins   56498  Self Care                            mins   83709  Canalith Repos         mins 40614      Un-Timed:  Electrical Stimulation:    15     mins  10134 ( );  Dry Needling          mins self-pay  Traction          mins 86213      Timed  Treatment:  50    mins   Total Treatment:     75   mins    Graciela Trejo, PT  [place] License: [id number]

## 2024-08-05 ENCOUNTER — TREATMENT (OUTPATIENT)
Dept: PHYSICAL THERAPY | Facility: CLINIC | Age: 69
End: 2024-08-05
Payer: COMMERCIAL

## 2024-08-05 DIAGNOSIS — R26.2 DIFFICULTY WALKING DUE TO KNEE JOINT: ICD-10-CM

## 2024-08-05 DIAGNOSIS — M25.662 DECREASED ROM OF LEFT KNEE: ICD-10-CM

## 2024-08-05 DIAGNOSIS — Z96.652 S/P TOTAL KNEE ARTHROPLASTY, LEFT: Primary | ICD-10-CM

## 2024-08-05 DIAGNOSIS — M25.562 ACUTE PAIN OF LEFT KNEE: ICD-10-CM

## 2024-08-05 PROCEDURE — 97014 ELECTRIC STIMULATION THERAPY: CPT | Performed by: PHYSICAL THERAPIST

## 2024-08-05 PROCEDURE — 97530 THERAPEUTIC ACTIVITIES: CPT | Performed by: PHYSICAL THERAPIST

## 2024-08-05 PROCEDURE — 97110 THERAPEUTIC EXERCISES: CPT | Performed by: PHYSICAL THERAPIST

## 2024-08-05 PROCEDURE — 97140 MANUAL THERAPY 1/> REGIONS: CPT | Performed by: PHYSICAL THERAPIST

## 2024-08-05 NOTE — PROGRESS NOTES
Physical Therapy Daily Treatment Note    Patient: Nabor Muhammad Dr.   : 1955  Referring practitioner: JASSI Smith  Date of Initial Visit: Type: THERAPY  Noted: 2024  Today's Date: 2024  Patient seen for 5 sessions       Visit Diagnoses:    ICD-10-CM ICD-9-CM   1. S/P total knee arthroplasty, left  Z96.652 V43.65   2. Acute pain of left knee  M25.562 719.46   3. Decreased ROM of left knee  M25.662 719.56   4. Difficulty walking due to knee joint  R26.2 719.7       Nabor Muhammad Dr. reports: his left leg is still really swollen.  He went to dinner after doing his HEP, so he did not get to ice until he got home.  States he is only taking his pain pills at night.      Subjective       Objective          Active Range of Motion   Left Knee   Extension: 0 degrees     Additional Active Range of Motion Details  Left knee AAROM in flex 100 degrees       See Exercise, Manual, and Modality Logs for complete treatment.       Assessment & Plan       Assessment  Assessment details: Pt is tolerating treatment, but continues to c/o left LE pain and swelling.  Pt's knee flexion AAROM continues to improve with some passive stretching.  Added LAQ and sidelying hip abd to increase quad and hip strength.  Added KT tape to left LCL for pain relief due to pt having isolated lateral knee pain during QS.  Ended treatment with IFC and cold pack to decrease pain and inflammation post exercise.  Will continue to see pt 3x/week for stretching, strengthening and modalities PRN for pain.            Progress per Plan of Care      Timed:         Manual Therapy:    8     mins  17503;     Therapeutic Exercise:   27      mins  71862;     Neuromuscular Tyrone:        mins  47418;    Therapeutic Activity:    15      mins  72942;     Gait Training:           mins  64540;     Ultrasound:          mins  02797;    Ionto                                   mins   69212  Self Care                            mins   68623  Lori  Repos         mins 53479      Un-Timed:  Electrical Stimulation:   15      mins  30328 ( );  Dry Needling          mins self-pay  Traction          mins 80079      Timed Treatment:  50    mins   Total Treatment:    75    mins    Graciela Trejo, PT  [place] License: [id number]

## 2024-08-07 ENCOUNTER — TREATMENT (OUTPATIENT)
Dept: PHYSICAL THERAPY | Facility: CLINIC | Age: 69
End: 2024-08-07
Payer: COMMERCIAL

## 2024-08-07 ENCOUNTER — OFFICE VISIT (OUTPATIENT)
Dept: ORTHOPEDIC SURGERY | Facility: CLINIC | Age: 69
End: 2024-08-07
Payer: COMMERCIAL

## 2024-08-07 VITALS — BODY MASS INDEX: 38.36 KG/M2 | WEIGHT: 274 LBS | HEIGHT: 71 IN

## 2024-08-07 DIAGNOSIS — M25.662 DECREASED ROM OF LEFT KNEE: ICD-10-CM

## 2024-08-07 DIAGNOSIS — Z96.652 S/P TOTAL KNEE ARTHROPLASTY, LEFT: Primary | ICD-10-CM

## 2024-08-07 DIAGNOSIS — Z96.652 STATUS POST TOTAL LEFT KNEE REPLACEMENT: Primary | ICD-10-CM

## 2024-08-07 DIAGNOSIS — Z96.652 STATUS POST TOTAL LEFT KNEE REPLACEMENT: ICD-10-CM

## 2024-08-07 DIAGNOSIS — M25.562 ACUTE PAIN OF LEFT KNEE: ICD-10-CM

## 2024-08-07 DIAGNOSIS — R26.2 DIFFICULTY WALKING DUE TO KNEE JOINT: ICD-10-CM

## 2024-08-07 PROCEDURE — 99024 POSTOP FOLLOW-UP VISIT: CPT | Performed by: NURSE PRACTITIONER

## 2024-08-07 RX ORDER — OXYCODONE HYDROCHLORIDE AND ACETAMINOPHEN 5; 325 MG/1; MG/1
1 TABLET ORAL EVERY 4 HOURS PRN
Qty: 42 TABLET | Refills: 0 | Status: SHIPPED | OUTPATIENT
Start: 2024-08-07

## 2024-08-07 RX ORDER — PREDNISONE 10 MG/1
TABLET ORAL
Qty: 39 TABLET | Refills: 0 | Status: SHIPPED | OUTPATIENT
Start: 2024-08-07

## 2024-08-07 NOTE — TELEPHONE ENCOUNTER
Rx Refill Note  Requested Prescriptions     Pending Prescriptions Disp Refills    oxyCODONE-acetaminophen (PERCOCET) 5-325 MG per tablet 42 tablet 0     Sig: Take 1 tablet by mouth Every 4 (Four) Hours As Needed for Moderate Pain or Severe Pain.      Last office visit with prescribing clinician: 4/24/2024      Next office visit with prescribing clinician: 8/28/2024   Last Filled:7/24/24    Encounter Diagnosis   Name Primary?    Status post total left knee replacement       Date of Surgery:7/23/24      Chastity Birmingham MA  08/07/24, 09:38 EDT    Previous RX pended for your approval, change or denial.     {TIP  Encounters:    {TIP  Please add Last Relevant Lab Date if appropriate:  {TIP  Is Refill Pharmacy correct?:

## 2024-08-07 NOTE — PROGRESS NOTES
Physical Therapy Daily Treatment Note      Patient: Nabor Muhammad Dr.   : 1955  Referring practitioner: JASSI Smith  Date of Initial Visit: Type: THERAPY  Noted: 2024  Today's Date: 2024  Patient seen for 6 sessions       Visit Diagnoses:    ICD-10-CM ICD-9-CM   1. S/P total knee arthroplasty, left  Z96.652 V43.65   2. Decreased ROM of left knee  M25.662 719.56   3. Acute pain of left knee  M25.562 719.46   4. Difficulty walking due to knee joint  R26.2 719.7       Nabor Muhammad Dr. reports: he saw JASSI Arcos this am and she was pleased with his progress.  Started him on a steroid to help with his swelling.  Pt sees Dr. Zarate on .  Pt states he was really sore after last visit.      Subjective       Objective          Active Range of Motion   Left Knee   Flexion: 98 degrees   Extension: 0 degrees     Additional Active Range of Motion Details  L knee flex AAROM: 104 deg      See Exercise, Manual, and Modality Logs for complete treatment.       Assessment & Plan       Assessment  Assessment details: Pt is responding to treatment with improving knee AROM in flex and extension, but still has moderate left LE effusion.  Pt tolerates all strengthening and stretching exercises.  Added LAQ and standing heel/toe raises.   Ended treatment with IFC and cold pack to decrease pain and inflammation post exercise.  Will continue to see pt 3x/week for stretching, strengthening and modalities PRN for pain.          Progress per Plan of Care      Timed:         Manual Therapy:    8     mins  81236;     Therapeutic Exercise:   29      mins  40120;     Neuromuscular Tyrone:        mins  22302;    Therapeutic Activity:    15      mins  65857;     Gait Training:           mins  18725;     Ultrasound:          mins  61795;    Ionto                                   mins   49374  Self Care                            mins   30680  Canalith Repos         mins 75523      Un-Timed:  Electrical  Stimulation:    15     mins  97939 ( );  Dry Needling          mins self-pay  Traction          mins 80038      Timed Treatment:  52    mins   Total Treatment:    75    mins    Graciela Trejo, PT  [place] License: [id number]

## 2024-08-08 NOTE — PROGRESS NOTES
CC: s/p left total knee arthroplasty, DOS 2024  Interval History: Nabor Muhammad Dr. returns for 2 week postoperative visit.  He is doing well. Pain is controlled with pain medication and is  improving. He denies any wound problem, fevers, or chills. Patient is continuing to work with outpatient PT. Ambulating without assistive device. Continuing DVT prophylaxis with use of aspirin.     Physical Examination:   Left knee was examined   Incision clean and dry   ROM 0-102,  4/5 strength   Stable to varus and valgus stress   Flex/extend ankle and toes   Positive sensation left foot   No calf pain, negative Homans sign bilaterally    Assessment/Plan:  Nabor Muhammad Dr. is recovering from surgery as expected.  We will continue outpatient therapy for range of motion, strengthening, and gait normalization.    He is to follow up in clinic in 3-4 weeks with xrays. Patient had all question answered today. Will continue DVT prophylaxis for an additional 2 weeks. Discussed with patient to avoid immersing incision for 4 weeks total after surgery.     Medications:  New Medications Ordered This Visit   Medications    predniSONE (DELTASONE) 10 MG tablet     Si mg daily x 3 days, 40 mg daily x 3 days, 20 mg daily x 3 days, 10 mg daily x 3 days     Dispense:  39 tablet     Refill:  0       JASSI Birmingham

## 2024-08-09 ENCOUNTER — TREATMENT (OUTPATIENT)
Dept: PHYSICAL THERAPY | Facility: CLINIC | Age: 69
End: 2024-08-09
Payer: COMMERCIAL

## 2024-08-09 DIAGNOSIS — M25.562 ACUTE PAIN OF LEFT KNEE: ICD-10-CM

## 2024-08-09 DIAGNOSIS — M25.662 DECREASED ROM OF LEFT KNEE: ICD-10-CM

## 2024-08-09 DIAGNOSIS — Z96.652 S/P TOTAL KNEE ARTHROPLASTY, LEFT: Primary | ICD-10-CM

## 2024-08-09 DIAGNOSIS — R26.2 DIFFICULTY WALKING DUE TO KNEE JOINT: ICD-10-CM

## 2024-08-09 NOTE — PROGRESS NOTES
Physical Therapy Daily Progress Note    : 12:11    Patient: Nabor Muhammad Dr.   : 1955  Diagnosis/ICD-10 Code:  S/P total knee arthroplasty, left [Z96.652]  Referring practitioner: JASSI Smith  Date of Initial Visit: Type: THERAPY  Noted: 2024  Today's Date: 2024  Patient seen for 7 sessions         Nabor Dofner reports: Pt reports he was pretty stiff and sore this morning when he woke up. He reports he cut his grass yesterday and was keeping up with all of his exercises.        Subjective     Objective          Active Range of Motion   Left Knee   Flexion: 100 degrees       See Exercise, Manual, and Modality Logs for complete treatment.       Assessment & Plan       Assessment  Assessment details: Treatment session focused on further knee mobility and LE strengthening exercises. Pt performed well with exercises and showed improved strength with active quad contraction. Pt progressed to 6 inch steps which he tolerated well. Pt still reports some discomfort in L LCL with SAQ and SLR but was able to complete them with no other issues. Pt received passive stretching to help improve knee mobility and was able to achieve AROM L knee flex of 100 deg today. IFC and ice were applied after exercises to help reduce soreness and inflammation. Pt will continue to benefit from PT to further improve his knee mobility and strength.        Progress per Plan of Care           Manual Therapy:   8      mins  79194;  Therapeutic Exercise:    20     mins  42389;     Neuromuscular Tyrone:        mins  27033;    Therapeutic Activity:     17     mins  00479;     Gait Training:           mins  68600;     Ultrasound:          mins  90384;    Electrical Stimulation:    15     mins  51482 ( );  Dry Needling          mins self-pay    Timed Treatment:   45   mins   Total Treatment:     68   mins    Lsisa Albert, PT  Physical Therapist    Oliverio Rebolledo, Student PT

## 2024-08-12 ENCOUNTER — TREATMENT (OUTPATIENT)
Dept: PHYSICAL THERAPY | Facility: CLINIC | Age: 69
End: 2024-08-12
Payer: COMMERCIAL

## 2024-08-12 DIAGNOSIS — M25.662 DECREASED ROM OF LEFT KNEE: ICD-10-CM

## 2024-08-12 DIAGNOSIS — Z96.652 S/P TOTAL KNEE ARTHROPLASTY, LEFT: Primary | ICD-10-CM

## 2024-08-12 DIAGNOSIS — M25.562 ACUTE PAIN OF LEFT KNEE: ICD-10-CM

## 2024-08-12 NOTE — PROGRESS NOTES
Physical Therapy Daily Treatment Note    [facility]  [address] [place] [address]  [phone] (phone)  [phone] (fax)    Patient: Nabor Muhammad Dr.   : 1955  Diagnosis/ICD-10 Code:  S/P total knee arthroplasty, left [Z96.652]  Referring practitioner: JASSI Smith  Date of Initial Visit: Type: THERAPY  Noted: 2024  Today's Date: 2024  Patient seen for 8 sessions           Subjective   Patient reports knee did a little better over the weekend. Worked at it really hard so quad is sore.    Objective     See Exercise, Manual, and Modality Logs for complete treatment.     Assessment/Plan  Patient had a slightly better tolerance to L knee flexion PROM stretching in sitting today and was able to achieve ~105 degrees. He complained of having more medial knee pain with quad sets today. Muscle endurance is improving with leg raises but there is still some fatigue with his last few repetitions. Continued to utilize ice/ESTIM for pain relief at end of session.         Timed:    Manual Therapy:    8     mins  72415;  Therapeutic Exercise:    24     mins  21589;     Neuromuscular Tyrone:        mins  27037;    Therapeutic Activity:     10     mins  47088;     Gait Training:           mins  90445;     Ultrasound:          mins  01242;    Electrical Stimulation:         mins  37284 ( );  Iontophoresis         mins 18753;  Aquatic Therapy         mins 64815;    Untimed:  Electrical Stimulation:    15     mins  16936 ( );  Traction:         mins  44400;   Dry Needling   (1-2 muscles)       mins  (Self-pay)  Dry Needling (3-4 muscles)        mins  (Self-pay)  Dry Needling Trial          mins DRYNDLTRIAL  (No Charge)    Timed Treatment:   42   mins   Total Treatment:     60   mins    Lissa Albert PT  Physical Therapist    [place] License:[id number]

## 2024-08-14 ENCOUNTER — TREATMENT (OUTPATIENT)
Dept: PHYSICAL THERAPY | Facility: CLINIC | Age: 69
End: 2024-08-14
Payer: COMMERCIAL

## 2024-08-14 DIAGNOSIS — M25.662 DECREASED ROM OF LEFT KNEE: ICD-10-CM

## 2024-08-14 DIAGNOSIS — M25.562 ACUTE PAIN OF LEFT KNEE: ICD-10-CM

## 2024-08-14 DIAGNOSIS — R26.2 DIFFICULTY WALKING DUE TO KNEE JOINT: ICD-10-CM

## 2024-08-14 DIAGNOSIS — Z96.652 S/P TOTAL KNEE ARTHROPLASTY, LEFT: Primary | ICD-10-CM

## 2024-08-14 NOTE — PROGRESS NOTES
"Physical Therapy Daily Treatment Note    [facility]  [address] [place] [address] [phone] (phone)  [phone] (fax)    Patient: Nabor Muhammad Dr.   : 1955  Diagnosis/ICD-10 Code:  S/P total knee arthroplasty, left [Z96.652]  Referring practitioner: JASSI Smith  Date of Initial Visit: Type: THERAPY  Noted: 2024  Today's Date: 2024  Patient seen for 9 sessions           Subjective   \"Knee is still feeling stiff.\"    Objective     See Exercise, Manual, and Modality Logs for complete treatment.     L knee flexion PROM: 110 degrees    Assessment/Plan  Patient demonstrates better muscle control with step ups. He worked hard on his ROM yesterday and it paid off as he was able to gain about 5 degrees of knee flexion. Movement also appeared a little smoother with heel slides today. Continued with ice/ESTIM at end of session to help manage knee pain and swelling.          Timed:    Manual Therapy:    8     mins  99867;  Therapeutic Exercise:    12     mins  83395;     Neuromuscular Tyrone:        mins  66926;    Therapeutic Activity:     10     mins  66218;     Gait Training:           mins  22943;     Ultrasound:          mins  72689;    Electrical Stimulation:         mins  61751 ( );  Iontophoresis         mins 94419;  Aquatic Therapy         mins 15176;    Untimed:  Electrical Stimulation:   15      mins  28533 ( );  Traction:         mins  84363;   Dry Needling   (1-2 muscles)       mins  (Self-pay)  Dry Needling (3-4 muscles)        mins  (Self-pay)  Dry Needling Trial          mins DRYNDLTRIAL  (No Charge)    Timed Treatment:   30   mins   Total Treatment:     60   mins    Lissa Albert PT  Physical Therapist    [place] License:[id number]  "

## 2024-08-16 ENCOUNTER — TREATMENT (OUTPATIENT)
Dept: PHYSICAL THERAPY | Facility: CLINIC | Age: 69
End: 2024-08-16
Payer: COMMERCIAL

## 2024-08-16 DIAGNOSIS — M25.662 DECREASED ROM OF LEFT KNEE: ICD-10-CM

## 2024-08-16 DIAGNOSIS — Z96.652 S/P TOTAL KNEE ARTHROPLASTY, LEFT: Primary | ICD-10-CM

## 2024-08-16 DIAGNOSIS — R26.2 DIFFICULTY WALKING DUE TO KNEE JOINT: ICD-10-CM

## 2024-08-16 DIAGNOSIS — M25.562 ACUTE PAIN OF LEFT KNEE: ICD-10-CM

## 2024-08-19 ENCOUNTER — TREATMENT (OUTPATIENT)
Dept: PHYSICAL THERAPY | Facility: CLINIC | Age: 69
End: 2024-08-19
Payer: COMMERCIAL

## 2024-08-19 DIAGNOSIS — R26.2 DIFFICULTY WALKING DUE TO KNEE JOINT: ICD-10-CM

## 2024-08-19 DIAGNOSIS — M25.562 ACUTE PAIN OF LEFT KNEE: ICD-10-CM

## 2024-08-19 DIAGNOSIS — M25.662 DECREASED ROM OF LEFT KNEE: ICD-10-CM

## 2024-08-19 DIAGNOSIS — Z96.652 S/P TOTAL KNEE ARTHROPLASTY, LEFT: Primary | ICD-10-CM

## 2024-08-19 NOTE — PROGRESS NOTES
"Physical Therapy Daily Treatment Note    Patient: Nabor Muhammad Dr.   : 1955  Referring practitioner: JASSI Smith  Date of Initial Visit: Type: THERAPY  Noted: 2024  Today's Date: 2024  Patient seen for 11 sessions       Visit Diagnoses:    ICD-10-CM ICD-9-CM   1. S/P total knee arthroplasty, left  Z96.652 V43.65   2. Decreased ROM of left knee  M25.662 719.56   3. Acute pain of left knee  M25.562 719.46   4. Difficulty walking due to knee joint  R26.2 719.7       Nabor Muhammad Dr. reports: he is feeling better, but it gets stiff after sitting down for a few minutes.      Subjective       Objective          Active Range of Motion   Left Knee   Flexion: 114 degrees   Extension: 0 degrees       See Exercise, Manual, and Modality Logs for complete treatment.       Assessment & Plan       Assessment  Assessment details: Pt is responding to treatment with improving knee AROM requiring minimal passive stretching to reach his end ranges.  Added leg press, SLB, and step downs.  Pt did complete 18 reps of step ups on 8 in step, but had \"stabbing\" pain in the left quad so returned to the 6 in step. Pt maintained SLB without UE for at least 30 sec without c/o left knee pain and good stability.  Pt completed full revolutions on the True Bike today after 2-3 minutes of doing half revolutions.   Ended treatment with IFC and cold pack to decrease pain and inflammation post exercise.  Will continue to see pt 3x/week for stretching, strengthening and modalities PRN for pain.          Progress per Plan of Care      Timed:         Manual Therapy:    8     mins  40823;     Therapeutic Exercise:   31      mins  77926;     Neuromuscular Tyrone:        mins  04182;    Therapeutic Activity:   18       mins  46129;     Gait Training:           mins  15145;     Ultrasound:          mins  39772;    Ionto                                   mins   10654  Self Care                            mins   82412  Lori " Repos         mins 05452      Un-Timed:  Electrical Stimulation:    15     mins  55726 ( );  Dry Needling          mins self-pay  Traction          mins 24197      Timed Treatment:  57    mins   Total Treatment:     80   mins    Graciela Trejo, PT  [place] License: [id number]

## 2024-08-19 NOTE — PROGRESS NOTES
Physical Therapy Daily Treatment Note      Patient: Nabor Muhammad Dr.   : 1955  Referring practitioner: JASSI Smith  Date of Initial Visit: Type: THERAPY  Noted: 2024  Today's Date: 2024  Patient seen for 10 sessions       Visit Diagnoses:    ICD-10-CM ICD-9-CM   1. S/P total knee arthroplasty, left  Z96.652 V43.65   2. Decreased ROM of left knee  M25.662 719.56   3. Acute pain of left knee  M25.562 719.46   4. Difficulty walking due to knee joint  R26.2 719.7       Nabor Muhammad Dr. reports: he is doing okay, but his quad is still hurting.      Subjective       Objective   See Exercise, Manual, and Modality Logs for complete treatment.       Assessment & Plan       Assessment  Assessment details: Pt is tolerating treatment well with improving functional mobility, ROM and increasing reps with his exercises.  Pt still has c/o left quad pain and tenderness.  Ended treatment with IFC and cold pack to decrease pain and inflammation post exercise.  Will continue to see pt 3x/week for stretching, strengthening and modalities PRN for pain.          Progress per Plan of Care      Timed:         Manual Therapy:    8     mins  18959;     Therapeutic Exercise:   27      mins  22217;     Neuromuscular Tyrone:        mins  63199;    Therapeutic Activity:    15      mins  15709;     Gait Training:           mins  95299;     Ultrasound:          mins  66660;    Ionto                                   mins   38884  Self Care                            mins   53528  Canalith Repos         mins 30903      Un-Timed:  Electrical Stimulation:    15     mins  55800 ( );  Dry Needling          mins self-pay  Traction          mins 46044      Timed Treatment:  50    mins   Total Treatment:    75    mins    Graciela Trejo, PT  [place] License: [id number]

## 2024-08-21 ENCOUNTER — TREATMENT (OUTPATIENT)
Dept: PHYSICAL THERAPY | Facility: CLINIC | Age: 69
End: 2024-08-21
Payer: COMMERCIAL

## 2024-08-21 DIAGNOSIS — R26.2 DIFFICULTY WALKING DUE TO KNEE JOINT: ICD-10-CM

## 2024-08-21 DIAGNOSIS — M25.662 DECREASED ROM OF LEFT KNEE: ICD-10-CM

## 2024-08-21 DIAGNOSIS — Z96.652 S/P TOTAL KNEE ARTHROPLASTY, LEFT: Primary | ICD-10-CM

## 2024-08-21 DIAGNOSIS — M25.562 ACUTE PAIN OF LEFT KNEE: ICD-10-CM

## 2024-08-21 NOTE — PROGRESS NOTES
"Physical Therapy Daily Treatment Note    Patient: Nabor Muhammad Dr.   : 1955  Referring practitioner: JASSI Smith  Date of Initial Visit: Type: THERAPY  Noted: 2024  Today's Date: 2024  Patient seen for 12 sessions       Visit Diagnoses:    ICD-10-CM ICD-9-CM   1. S/P total knee arthroplasty, left  Z96.652 V43.65   2. Decreased ROM of left knee  M25.662 719.56   3. Acute pain of left knee  M25.562 719.46   4. Difficulty walking due to knee joint  R26.2 719.7       Nabor Muhammad Dr. reports: he did not sleep well.  His quad has been hurting since Monday's treatment.      Subjective       Objective          Palpation   Left   Tenderness of the rectus femoris and vastus lateralis.     Additional Palpation Details  Pt with moderate left proximal quad tenderness     Active Range of Motion   Left Knee   Flexion: 116 degrees   Extension: 0 degrees     Additional Active Range of Motion Details  Left knee supine flex AAROM 116 degrees       See Exercise, Manual, and Modality Logs for complete treatment.       Assessment & Plan       Assessment  Assessment details: Pt reports to the clinic with increased quad pain and tenderness.  Added US treatment to left proximal quad to decrease pain, tenderness, and improved tissue healing.  Held forward and lateral step ups today due to pt's c/o having \"sharp\" quad pain during step ups last visit.  Pt continued to make full revolutions on bike after a couple minutes of making half circles.  Ended treatment with IFC and cold pack to decrease pain and inflammation post exercise.  Will continue to see pt 3x/week for stretching, strengthening and modalities PRN for pain.            Progress per Plan of Care      Timed:         Manual Therapy:    8     mins  15470;     Therapeutic Exercise:    22     mins  93374;     Neuromuscular Tyrone:        mins  37668;    Therapeutic Activity:    17      mins  90429;     Gait Training:           mins  49413;   "   Ultrasound:     8    mins  80369;    Ionto                                   mins   26587  Self Care                            mins   68692  Canalith Repos         mins 26429      Un-Timed:  Electrical Stimulation:    15     mins  71423 ( );  Dry Needling          mins self-pay  Traction          mins 14401      Timed Treatment:  55    mins   Total Treatment:     75   mins    Graciela Trejo, PT  [place] License: [id number]

## 2024-08-23 ENCOUNTER — TREATMENT (OUTPATIENT)
Dept: PHYSICAL THERAPY | Facility: CLINIC | Age: 69
End: 2024-08-23
Payer: COMMERCIAL

## 2024-08-23 DIAGNOSIS — M25.662 DECREASED ROM OF LEFT KNEE: ICD-10-CM

## 2024-08-23 DIAGNOSIS — R26.2 DIFFICULTY WALKING DUE TO KNEE JOINT: ICD-10-CM

## 2024-08-23 DIAGNOSIS — M25.562 ACUTE PAIN OF LEFT KNEE: ICD-10-CM

## 2024-08-23 DIAGNOSIS — Z96.652 S/P TOTAL KNEE ARTHROPLASTY, LEFT: Primary | ICD-10-CM

## 2024-08-23 NOTE — PROGRESS NOTES
Re-Assessment / Re-Certification      Patient: Nabor Muhammad Dr.   : 1955  Diagnosis/ICD-10 Code:  S/P total knee arthroplasty, left [Z96.652]  Referring practitioner: JASSI Smith  Date of Initial Visit: 2024  Today's Date: 2024  Patient seen for 13 sessions      Subjective:   Nabor Muhammad reports: his left knee pain on a daily average is a 3-4/10.  He still gets a sharp pain in his left quad when he climbs the steps.    Subjective Questionnaire: LEFS: 60/80  Clinical Progress: improved  Home Program Compliance: Yes  Treatment has included: therapeutic exercise, neuromuscular re-education, manual therapy, therapeutic activity, electrical stimulation, ultrasound, cryotherapy, and pt has been instructed in a HEP.      Subjective   Objective          Palpation   Left   Hypertonic in the rectus femoris and vastus lateralis.   Tenderness of the rectus femoris and vastus lateralis.     Additional Palpation Details  Mild left tightness and tenderness in lateral quad and rectus femoris     Tenderness   Left Knee   Tenderness in the LCL (distal).     Additional Tenderness Details  Mild left LCL tenderness     Active Range of Motion   Left Knee   Extension: 0 degrees     Additional Active Range of Motion Details  Pt's left knee AAROM knee flex 114 degrees     Patellar Mobility   Left Knee Patellar tendons within functional limits include the medial, lateral, superior and inferior.     Strength/Myotome Testing     Left Knee   Flexion: 5  Extension: 5  Quadriceps contraction: good    Ambulation     Observational Gait   Gait: antalgic   Walking speed and left swing time within functional limits. Decreased stride length, left stance time and left step length.   Left foot contact pattern: foot flat    Additional Observational Gait Details  Pt ambulates WBAT left LE minimal antalgic gait with decreased knee flexion.        Assessment & Plan       Assessment  Assessment details: Pt is tolerating  treatment fairly well, but is still having pain and tenderness over the left quad.  Pt reports decreased pain and tenderness after initiating US treatment to the left quad last visit.  Continued to hold steps today due to pt's c/o sharp quad pain.  Pt with improved knee flexion AROM and increased weight with leg lifts.  Pt has met 2/9 goals, but is showing progress towards his other goals.  Continued with the US treatment to his left quad to decrease pain and tenderness.  Ended treatment with IFC and cold packs to decrease post exercise soreness and knee effusion.  Plan to continue to see pt 3x/week for stretching, strengthening, and modalities PRN for 4 more weeks.        Progress toward previous goals: Partially Met      Goals  Plan Goals: STGs: 2-4 weeks  1. Decrease left knee pain to 5/10 with standing and ambulation.  MET   2. Increase left knee AROM 0-115 degrees for improved ability to dress his left LE.  PROGRESSING   3.  Decrease left medial/lateral knee tenderness to minimal to none for improved ability to perform his ADLs.   PARTIALLY MET   4.  Pt ambulates into clinic without an AD minimal antalgic gait left LE for improved balance and functional mobility  MET   5.  Decrease left knee effusion at girth measurements by 1-2 cm for improved ROM and ability to dress his left LE.  NOT MEASURED TODAY      LTGs: 5-8 weeks  1.  Pt Independent with HEP for self management of symptoms. PROGRESSING   2.  Increase left knee AROM 0-125 degrees for improved ability to get into and out of his car and perform his ADLs.  PROGRESSING   3.  Increase left knee and hip strength to 5/5 for improved ability to stand, ambulate and climb steps.   PARTIALLY MET   4.  Pt ambulates left LE without an antalgic gait for improved mobility and balance to return to work  PROGRESSING        Recommendations: Continue as planned  Timeframe: 1 month  Prognosis to achieve goals: good    PT Signature: Graciela Trejo, PT [place] #  [id number]  Electronically signed 8/23/2024       Manual Therapy:    8     mins  67049;  Therapeutic Exercise:   22      mins  18399;     Neuromuscular Tyrone:        mins  44280;    Therapeutic Activity:   20       mins  27747;     Gait Training:           mins  50230;     Ultrasound:     8     mins  13065;    Electrical Stimulation:    15     mins  94034 ( );  Traction                       ___ mins  26513    Timed Treatment:   58   mins   Total Treatment:     84   mins

## 2024-08-26 ENCOUNTER — TREATMENT (OUTPATIENT)
Dept: PHYSICAL THERAPY | Facility: CLINIC | Age: 69
End: 2024-08-26
Payer: COMMERCIAL

## 2024-08-26 DIAGNOSIS — M25.562 ACUTE PAIN OF LEFT KNEE: ICD-10-CM

## 2024-08-26 DIAGNOSIS — M25.662 DECREASED ROM OF LEFT KNEE: ICD-10-CM

## 2024-08-26 DIAGNOSIS — R26.2 DIFFICULTY WALKING DUE TO KNEE JOINT: ICD-10-CM

## 2024-08-26 DIAGNOSIS — Z96.652 S/P TOTAL KNEE ARTHROPLASTY, LEFT: Primary | ICD-10-CM

## 2024-08-26 NOTE — PROGRESS NOTES
Physical Therapy Daily Treatment Note    [facility]  [address], [place] [id number] [phone] (phone)  [phone] (fax)    Patient: Nabor Muhammad Dr.   : 1955  Diagnosis/ICD-10 Code:  S/P total knee arthroplasty, left [Z96.652]  Referring practitioner: JASSI Smith  Date of Initial Visit: Type: THERAPY  Noted: 2024  Today's Date: 2024  Patient seen for 14 sessions           Subjective   Patient reports he is having a lot of quad pain as well as lateral knee tenderness. He avoided step ups this weekend per his PT advise Friday.    Objective     See Exercise, Manual, and Modality Logs for complete treatment.     Assessment/Plan  Deferred step ups/downs today to try and lessen the tightness in Nabor's quad both anteriorly and laterally. Spent more time on STM to quad due to patient having a palpable knot. Leg felt noticeably smoother after US and STM with tiger tail.         Timed:    Manual Therapy:    10     mins  83437;  Therapeutic Exercise:    12     mins  99006;     Neuromuscular Tyrone:        mins  23717;    Therapeutic Activity:          mins  57998;     Gait Training:           mins  60849;     Ultrasound:     8     mins  70479;    Electrical Stimulation:         mins  33354 ( );  Iontophoresis         mins 23691;  Aquatic Therapy         mins 12507;    Untimed:  Electrical Stimulation:   15      mins  09169 ( );  Traction:         mins  58007;   Dry Needling   (1-2 muscles)       mins  (Self-pay)  Dry Needling (3-4 muscles)        mins  (Self-pay)  Dry Needling Trial          mins DRYNDLTRIAL  (No Charge)    Timed Treatment:   30   mins   Total Treatment:     65   mins    Lissa Albert, PT  Physical Therapist    [place] License:[id number]

## 2024-08-28 ENCOUNTER — TREATMENT (OUTPATIENT)
Dept: PHYSICAL THERAPY | Facility: CLINIC | Age: 69
End: 2024-08-28
Payer: COMMERCIAL

## 2024-08-28 ENCOUNTER — OFFICE VISIT (OUTPATIENT)
Dept: ORTHOPEDIC SURGERY | Facility: CLINIC | Age: 69
End: 2024-08-28
Payer: COMMERCIAL

## 2024-08-28 VITALS — WEIGHT: 274 LBS | BODY MASS INDEX: 38.36 KG/M2 | HEIGHT: 71 IN

## 2024-08-28 DIAGNOSIS — M25.662 DECREASED ROM OF LEFT KNEE: ICD-10-CM

## 2024-08-28 DIAGNOSIS — Z96.652 S/P TOTAL KNEE ARTHROPLASTY, LEFT: Primary | ICD-10-CM

## 2024-08-28 DIAGNOSIS — R26.2 DIFFICULTY WALKING DUE TO KNEE JOINT: ICD-10-CM

## 2024-08-28 DIAGNOSIS — Z96.652 STATUS POST TOTAL LEFT KNEE REPLACEMENT: Primary | ICD-10-CM

## 2024-08-28 DIAGNOSIS — M25.562 ACUTE PAIN OF LEFT KNEE: ICD-10-CM

## 2024-08-28 PROCEDURE — 99024 POSTOP FOLLOW-UP VISIT: CPT | Performed by: ORTHOPAEDIC SURGERY

## 2024-08-28 RX ORDER — OXYCODONE AND ACETAMINOPHEN 5; 325 MG/1; MG/1
1 TABLET ORAL EVERY 4 HOURS PRN
Qty: 42 TABLET | Refills: 0 | Status: SHIPPED | OUTPATIENT
Start: 2024-08-28

## 2024-08-28 RX ORDER — CYCLOBENZAPRINE HCL 5 MG
5 TABLET ORAL 3 TIMES DAILY PRN
Qty: 45 TABLET | Refills: 0 | Status: SHIPPED | OUTPATIENT
Start: 2024-08-28

## 2024-08-28 NOTE — PROGRESS NOTES
Physical Therapy Daily Treatment Note      Patient: Nabor Muhammad Dr.   : 1955  Referring practitioner: JASSI Smith  Date of Initial Visit: Type: THERAPY  Noted: 2024  Today's Date: 2024  Patient seen for 15 sessions       Visit Diagnoses:    ICD-10-CM ICD-9-CM   1. S/P total knee arthroplasty, left  Z96.652 V43.65   2. Decreased ROM of left knee  M25.662 719.56   3. Difficulty walking due to knee joint  R26.2 719.7   4. Acute pain of left knee  M25.562 719.46       Nabor Muhammad Dr. reports: he saw MD today prior to PT.  Pt states he is to try to wear some compression socks and he may try him on another steroid.  Pt states MD said he was okay with the dry needling only if it was up on the quad and not around the knee joint.      Subjective       Objective          Active Range of Motion   Left Knee   Flexion: 117 degrees   Extension: 0 degrees       See Exercise, Manual, and Modality Logs for complete treatment.       Assessment & Plan       Assessment  Assessment details: Pt is doing well with therapy.  Pt had increased left knee flexion AROM after passive stretching.  Pt still has moderate left lateral quad tenderness and tightness, so continued with the US treatment to decrease pain and tenderness.  Held steps today due to c/o left sharp quad pain.  Added green pad to SLB and pt showed increased muscle fatigue and difficulty maintaining balance after the 2 rep.  Ended treatment with IFC and cold packs to decrease post exercise soreness and knee effusion.  Plan to continue to see pt 3x/week for stretching, strengthening, and modalities PRN.          Progress per Plan of Care      Timed:         Manual Therapy:    6     mins  97379;     Therapeutic Exercise:    30     mins  67986;     Neuromuscular Tyrone:        mins  56299;    Therapeutic Activity:     18     mins  56186;     Gait Training:           mins  11441;     Ultrasound:     8     mins  83213;    Ionto                                    mins   85564  Self Care                            mins   15376  Canalith Repos         mins 46506      Un-Timed:  Electrical Stimulation:    15     mins  85874 ( );  Dry Needling          mins self-pay  Traction          mins 59398      Timed Treatment:  62    mins   Total Treatment:    83    mins    Graciela Trejo, PT  [place] License: [id number]

## 2024-08-28 NOTE — PROGRESS NOTES
CC: s/p left total knee arthroplasty, DOS 7/23/2024  Interval History: Nabor Muhammad Dr. returns for 5 week postoperative visit.  He is doing well. Pain is controlled with pain medication and is  improving.  Primary issue has been pain in his distal quad particularly laterally where is noted tightness.  He denies any wound problem, fevers, or chills. Patient is continuing to work with outpatient PT. Ambulating without cane.     Physical Examination: Left knee was examined   Incision well healed   ROM 0-110,  4/5 strength   Stable to varus and valgus stress   Flex/extend ankle and toes   Positive sensation left foot   No calf pain, negative Homans sign bilaterally    Radiographic review: Radiographs of the left knee 3 views, AP, lateral and patella axial views, compared to immediate postop films, indication s/p TKA,  shows that the implant is in good position. There is no evidence of implant loosening or osteolysis, no dislocation or periprosthetic fractures. Overall alignment acceptable at this time.     Assessment/Plan:  Nabor Muhammad Dr. is recovering from surgery as expected.  We will continue outpatient therapy for range of motion, strengthening, and gait normalization.   Flexeril given today to help with quad stiffness.  Will discuss potential dry needling with physical therapist.  Recommended thigh-high compression stocking for residual swelling.  He is to follow up in clinic in 6 weeks with no xrays. Patient had all question answered today. Discussed need for prophylactic antibiotics with dental/endoscopy procedures.     Medications:  New Medications Ordered This Visit   Medications    oxyCODONE-acetaminophen (PERCOCET) 5-325 MG per tablet     Sig: Take 1 tablet by mouth Every 4 (Four) Hours As Needed for Moderate Pain or Severe Pain.     Dispense:  42 tablet     Refill:  0       Terry Zarate MD

## 2024-08-30 ENCOUNTER — TREATMENT (OUTPATIENT)
Dept: PHYSICAL THERAPY | Facility: CLINIC | Age: 69
End: 2024-08-30
Payer: COMMERCIAL

## 2024-08-30 DIAGNOSIS — M25.662 DECREASED ROM OF LEFT KNEE: ICD-10-CM

## 2024-08-30 DIAGNOSIS — Z96.652 S/P TOTAL KNEE ARTHROPLASTY, LEFT: Primary | ICD-10-CM

## 2024-08-30 DIAGNOSIS — R26.2 DIFFICULTY WALKING DUE TO KNEE JOINT: ICD-10-CM

## 2024-08-30 DIAGNOSIS — M25.562 ACUTE PAIN OF LEFT KNEE: ICD-10-CM

## 2024-08-30 NOTE — PROGRESS NOTES
Physical Therapy Daily Treatment Note      Patient: Nabor Muhammad Dr.   : 1955  Referring practitioner: JASSI Smith  Date of Initial Visit: Type: THERAPY  Noted: 2024  Today's Date: 2024  Patient seen for 16 sessions       Visit Diagnoses:    ICD-10-CM ICD-9-CM   1. S/P total knee arthroplasty, left  Z96.652 V43.65   2. Decreased ROM of left knee  M25.662 719.56   3. Difficulty walking due to knee joint  R26.2 719.7   4. Acute pain of left knee  M25.562 719.46       Nabor Muhammad Dr. reports: he is doing okay, but the quad pain still hurts especially at night.      Subjective       Objective          Active Range of Motion   Left Knee   Flexion: 118 degrees       See Exercise, Manual, and Modality Logs for complete treatment.       Assessment & Plan       Assessment  Assessment details: Pt continues to c/o left quad pain and tenderness, but improving.  Pt received dry needling today prior to performing his stretching and strengthening exercises and pt could perform forward/lateral steps ups with minimal discomfort.  Pt with improved left knee AAROM in flexion to 118 degrees.  Pt still with moderate discomfort at end ranges of knee flexion, but pt with less resistance during stretching today.  Pt completed all strengthening exercises without c/o pain.  Performed US to left lateral quad to promote tissue healing and blood flow after having the dry needling.  Ended IFC and cold pack to left knee post exercise to decrease joint effusion.  Will continue to see pt 2-3x/week for stretching, strengthening and modalities PRN for pain.          Progress per Plan of Care      Timed:         Manual Therapy:    8     mins  56982;     Therapeutic Exercise:    22     mins  71083;     Neuromuscular Tyrone:        mins  92299;    Therapeutic Activity:    17      mins  43605;     Gait Training:           mins  72103;     Ultrasound:     8     mins  20219;    Ionto                                   mins    86083  Self Care                           mins   43979  Canalith Repos         mins 31896      Un-Timed:  Electrical Stimulation:    15     mins  77995 ( );  Dry Needling          mins self-pay  Traction          mins 05826      Timed Treatment:  55    mins   Total Treatment:     75   mins    Graciela Trejo, PT  [place] License: [id number]

## 2024-08-30 NOTE — PROGRESS NOTES
Physical Therapy Daily Treatment Note    [facility]  [address] [place] [address]  [phone] (phone)  [phone] (fax)    Patient: Nabor Muahmmad Dr.   : 1955  Diagnosis/ICD-10 Code:  S/P total knee arthroplasty, left [Z96.652]  Referring practitioner: No ref. provider found  Date of Initial Visit: Type: THERAPY  Noted: 2024  Today's Date: 2024  Patient seen for 1 sessions           Subjective   Patient reports he is dealing with a lot of muscle tightness above his knee joint.     Objective     See Exercise, Manual, and Modality Logs for complete treatment.     Assessment/Plan  Performed dry needling, using threading and direct techniques. Obtained written and verbal consent to treat after discussing benefits and risks.  Patient position during treatment: supine. Muscles treated: L quads (avoided getting close to knee joint due to TKA). Response: Muscle tightness. Clean needle technique observed at all times, precautions for lung fields, neurovascular structures observed. Manual palpation and assessment performed before, during, and after session.          Timed:    Manual Therapy:         mins  28296;  Therapeutic Exercise:         mins  93666;     Neuromuscular Tyrone:        mins  51678;    Therapeutic Activity:          mins  53562;     Gait Training:           mins  24439;     Ultrasound:          mins  40268;    Electrical Stimulation:         mins  55178 ( );  Iontophoresis         mins 39566;  Aquatic Therapy         mins 41251;    Untimed:  Electrical Stimulation:         mins  41925 ( );  Traction:         mins  55814;   Dry Needling   (1-2 muscles)       mins  (Self-pay)  Dry Needling (3-4 muscles)        mins  (Self-pay)  Dry Needling Trial      10    mins DRYNDLTRIAL  (No Charge)    Timed Treatment:      mins   Total Treatment:     10   mins    Lissa Albert PT  Physical Therapist    [place] License:[id number]

## 2024-09-03 ENCOUNTER — TREATMENT (OUTPATIENT)
Dept: PHYSICAL THERAPY | Facility: CLINIC | Age: 69
End: 2024-09-03
Payer: COMMERCIAL

## 2024-09-03 DIAGNOSIS — R26.2 DIFFICULTY WALKING DUE TO KNEE JOINT: ICD-10-CM

## 2024-09-03 DIAGNOSIS — M25.562 ACUTE PAIN OF LEFT KNEE: ICD-10-CM

## 2024-09-03 DIAGNOSIS — Z96.652 S/P TOTAL KNEE ARTHROPLASTY, LEFT: Primary | ICD-10-CM

## 2024-09-03 DIAGNOSIS — M25.662 DECREASED ROM OF LEFT KNEE: ICD-10-CM

## 2024-09-05 ENCOUNTER — TREATMENT (OUTPATIENT)
Dept: PHYSICAL THERAPY | Facility: CLINIC | Age: 69
End: 2024-09-05
Payer: COMMERCIAL

## 2024-09-05 DIAGNOSIS — M25.562 ACUTE PAIN OF LEFT KNEE: ICD-10-CM

## 2024-09-05 DIAGNOSIS — M25.662 DECREASED ROM OF LEFT KNEE: Primary | ICD-10-CM

## 2024-09-05 DIAGNOSIS — R26.2 DIFFICULTY WALKING DUE TO KNEE JOINT: ICD-10-CM

## 2024-09-05 DIAGNOSIS — Z96.652 S/P TOTAL KNEE ARTHROPLASTY, LEFT: ICD-10-CM

## 2024-09-05 NOTE — PROGRESS NOTES
"Physical Therapy Daily Treatment Note    [facility]  [address], [place] [id number] [phone] (phone)  [phone] (fax)    Patient: Nabor Muhammad Dr.   : 1955  Diagnosis/ICD-10 Code:  Decreased ROM of left knee [M25.662]  Referring practitioner: JASSI Smith  Date of Initial Visit: Type: THERAPY  Noted: 2024  Today's Date: 2024  Patient seen for 18 sessions           Subjective   Patient reports that he does appear able to do more since the dry needling.     Objective     See Exercise, Manual, and Modality Logs for complete treatment.     L knee flexion PROM=117 degrees    Assessment/Plan  Patient demonstrates good quad control with leg press and step ups. He still has some difficulty fully controlling eccentric quad strength during step downs. He also complains of a \"ripping\" sensation in the L knee during end range flexion PROM w/ overpressure.          Timed:    Manual Therapy:    8     mins  94706;  Therapeutic Exercise:    12     mins  41653;     Neuromuscular Tyrone:        mins  91346;    Therapeutic Activity:     10     mins  43579;     Gait Training:           mins  56242;     Ultrasound:          mins  05743;    Electrical Stimulation:         mins  30115 ( );  Iontophoresis         mins 61470;  Aquatic Therapy         mins 22295;    Untimed:  Electrical Stimulation:    15     mins  98290 ( );  Traction:         mins  88064;   Dry Needling   (1-2 muscles)       mins  (Self-pay)  Dry Needling (3-4 muscles)        mins  (Self-pay)  Dry Needling Trial          mins DRYNDLTRIAL  (No Charge)    Timed Treatment:   30   mins   Total Treatment:     70   mins    Lissa Albert PT  Physical Therapist    [place] License:[id number]  "

## 2024-09-11 ENCOUNTER — TREATMENT (OUTPATIENT)
Dept: PHYSICAL THERAPY | Facility: CLINIC | Age: 69
End: 2024-09-11
Payer: COMMERCIAL

## 2024-09-11 DIAGNOSIS — Z96.652 S/P TOTAL KNEE ARTHROPLASTY, LEFT: ICD-10-CM

## 2024-09-11 DIAGNOSIS — R26.2 DIFFICULTY WALKING DUE TO KNEE JOINT: Primary | ICD-10-CM

## 2024-09-11 DIAGNOSIS — M25.662 DECREASED ROM OF LEFT KNEE: ICD-10-CM

## 2024-09-11 NOTE — PROGRESS NOTES
"Physical Therapy Daily Treatment Note    [facility]  [address], [place] [address] [phone] (phone)  [phone] (fax)    Patient: Nabor Muhammad Dr.   : 1955  Diagnosis/ICD-10 Code:  Difficulty walking due to knee joint [R26.2]  Referring practitioner: JASSI Smith  Date of Initial Visit: Type: THERAPY  Noted: 2024  Today's Date: 2024  Patient seen for 19 sessions           Subjective   Patient complains of more medial knee pain today.  He is back at work. Has stiffness and increased pain.    Objective     See Exercise, Manual, and Modality Logs for complete treatment.     L knee PROM: 116 degrees    Assessment/Plan  Patient still has some trouble with eccentric quad control during step downs but he was able to perform them at 6\" height today. He was unable to complete full revolutions on bike during his warm up but had more success after performing manual stretches. Left knee PROM felt stiff once >100 degrees.          Timed:    Manual Therapy:    10     mins  22133;  Therapeutic Exercise:    12     mins  82041;     Neuromuscular Tyrone:        mins  78914;    Therapeutic Activity:     8     mins  65061;     Gait Training:           mins  46728;     Ultrasound:          mins  55684;    Electrical Stimulation:         mins  34151 ( );  Iontophoresis         mins 54072;  Aquatic Therapy         mins 11913;    Untimed:  Electrical Stimulation:    15     mins  11669 ( );  Traction:         mins  90080;   Dry Needling   (1-2 muscles)       mins  (Self-pay)  Dry Needling (3-4 muscles)        mins  (Self-pay)  Dry Needling Trial          mins DRYNDLTRIAL  (No Charge)    Timed Treatment:   30   mins   Total Treatment:     50   mins    Lissa Albert PT  Physical Therapist    [place] License:[id number]  "

## 2024-09-13 ENCOUNTER — TREATMENT (OUTPATIENT)
Dept: PHYSICAL THERAPY | Facility: CLINIC | Age: 69
End: 2024-09-13
Payer: COMMERCIAL

## 2024-09-13 DIAGNOSIS — M25.662 DECREASED ROM OF LEFT KNEE: ICD-10-CM

## 2024-09-13 DIAGNOSIS — R26.2 DIFFICULTY WALKING DUE TO KNEE JOINT: Primary | ICD-10-CM

## 2024-09-13 DIAGNOSIS — Z96.652 S/P TOTAL KNEE ARTHROPLASTY, LEFT: ICD-10-CM

## 2024-09-13 DIAGNOSIS — M25.562 ACUTE PAIN OF LEFT KNEE: ICD-10-CM

## 2024-09-13 NOTE — PROGRESS NOTES
Physical Therapy Daily Treatment Note    Patient: Nabor Muhammad Dr.   : 1955  Referring practitioner: JASSI Smith  Date of Initial Visit: Type: THERAPY  Noted: 3/8/2024  Today's Date: 2024  Patient seen for 16 sessions       Visit Diagnoses:    ICD-10-CM ICD-9-CM   1. Difficulty walking due to knee joint  R26.2 719.7   2. S/P total knee arthroplasty, left  Z96.652 V43.65   3. Decreased ROM of left knee  M25.662 719.56   4. Acute pain of left knee  M25.562 719.46       Nabor Muhammad Dr. reports: since he has been on his feet a little more the last few days it is starting to feel a little better.      Subjective       Objective          Active Range of Motion   Left Knee   Flexion: 117 degrees   Extension: 0 degrees     Additional Active Range of Motion Details  Knee flex AAROM 118       See Exercise, Manual, and Modality Logs for complete treatment.       Assessment & Plan       Assessment  Assessment details: Pt reports to the clinic today with less c/o pain and stiffness.  Pt completed full revolutions backwards on the bike today prior to stretching.  Pt reached 118 degrees of knee AAROM flex after passive stretching with less c/o pain and tightness at end ranges of motion.  Pt performed all strengthening and stretching exercises without difficulty.  Pt increased step height with step ups and step downs without c/o left lateral quad pain.  Ended IFC and cold pack to left knee post exercise to decrease joint effusion.  Will continue to see pt 2-3x/week for stretching, strengthening and modalities PRN for pain.            Progress per Plan of Care      Timed:         Manual Therapy:    8     mins  39630;     Therapeutic Exercise:   26      mins  79894;     Neuromuscular Tyrone:        mins  92508;    Therapeutic Activity:    18      mins  22279;     Gait Training:           mins  57815;     Ultrasound:          mins  06028;    Ionto                                   mins   60539  Self Care                             mins   08048  [facility]         mins 72572      Un-Timed:  Electrical Stimulation:    15     mins  32152 ( );  Dry Needling          mins self-pay  Traction          mins 09297      Timed Treatment:   52   mins   Total Treatment:     75   mins    Graciela Trejo, PT  [place] License: [id number]

## 2024-09-17 ENCOUNTER — TREATMENT (OUTPATIENT)
Dept: PHYSICAL THERAPY | Facility: CLINIC | Age: 69
End: 2024-09-17
Payer: COMMERCIAL

## 2024-09-17 DIAGNOSIS — R26.2 DIFFICULTY WALKING DUE TO KNEE JOINT: Primary | ICD-10-CM

## 2024-09-17 DIAGNOSIS — M25.562 ACUTE PAIN OF LEFT KNEE: ICD-10-CM

## 2024-09-17 DIAGNOSIS — Z96.652 S/P TOTAL KNEE ARTHROPLASTY, LEFT: ICD-10-CM

## 2024-09-17 DIAGNOSIS — M25.662 DECREASED ROM OF LEFT KNEE: ICD-10-CM

## 2024-09-20 ENCOUNTER — TREATMENT (OUTPATIENT)
Dept: PHYSICAL THERAPY | Facility: CLINIC | Age: 69
End: 2024-09-20
Payer: COMMERCIAL

## 2024-09-20 DIAGNOSIS — R26.2 DIFFICULTY WALKING DUE TO KNEE JOINT: Primary | ICD-10-CM

## 2024-09-20 DIAGNOSIS — M25.662 DECREASED ROM OF LEFT KNEE: ICD-10-CM

## 2024-09-20 DIAGNOSIS — Z96.652 S/P TOTAL KNEE ARTHROPLASTY, LEFT: ICD-10-CM

## 2024-09-24 ENCOUNTER — TREATMENT (OUTPATIENT)
Dept: PHYSICAL THERAPY | Facility: CLINIC | Age: 69
End: 2024-09-24
Payer: COMMERCIAL

## 2024-09-24 DIAGNOSIS — M25.662 DECREASED ROM OF LEFT KNEE: ICD-10-CM

## 2024-09-24 DIAGNOSIS — M25.562 ACUTE PAIN OF LEFT KNEE: ICD-10-CM

## 2024-09-24 DIAGNOSIS — Z96.652 S/P TOTAL KNEE ARTHROPLASTY, LEFT: ICD-10-CM

## 2024-09-24 DIAGNOSIS — R26.2 DIFFICULTY WALKING DUE TO KNEE JOINT: Primary | ICD-10-CM

## 2024-09-24 PROCEDURE — 97530 THERAPEUTIC ACTIVITIES: CPT | Performed by: PHYSICAL THERAPIST

## 2024-09-24 PROCEDURE — 97014 ELECTRIC STIMULATION THERAPY: CPT | Performed by: PHYSICAL THERAPIST

## 2024-09-24 PROCEDURE — 97110 THERAPEUTIC EXERCISES: CPT | Performed by: PHYSICAL THERAPIST

## 2024-09-24 PROCEDURE — 97140 MANUAL THERAPY 1/> REGIONS: CPT | Performed by: PHYSICAL THERAPIST

## 2024-09-27 ENCOUNTER — TREATMENT (OUTPATIENT)
Dept: PHYSICAL THERAPY | Facility: CLINIC | Age: 69
End: 2024-09-27
Payer: COMMERCIAL

## 2024-09-27 DIAGNOSIS — M25.662 DECREASED ROM OF LEFT KNEE: ICD-10-CM

## 2024-09-27 DIAGNOSIS — R26.2 DIFFICULTY WALKING DUE TO KNEE JOINT: Primary | ICD-10-CM

## 2024-09-27 DIAGNOSIS — Z96.652 S/P TOTAL KNEE ARTHROPLASTY, LEFT: ICD-10-CM

## 2024-10-01 ENCOUNTER — TREATMENT (OUTPATIENT)
Dept: PHYSICAL THERAPY | Facility: CLINIC | Age: 69
End: 2024-10-01
Payer: COMMERCIAL

## 2024-10-01 DIAGNOSIS — Z96.652 S/P TOTAL KNEE ARTHROPLASTY, LEFT: ICD-10-CM

## 2024-10-01 DIAGNOSIS — R26.2 DIFFICULTY WALKING DUE TO KNEE JOINT: Primary | ICD-10-CM

## 2024-10-01 DIAGNOSIS — M25.562 ACUTE PAIN OF LEFT KNEE: ICD-10-CM

## 2024-10-01 DIAGNOSIS — M25.662 DECREASED ROM OF LEFT KNEE: ICD-10-CM

## 2024-10-01 PROCEDURE — 97014 ELECTRIC STIMULATION THERAPY: CPT | Performed by: PHYSICAL THERAPIST

## 2024-10-01 PROCEDURE — 97110 THERAPEUTIC EXERCISES: CPT | Performed by: PHYSICAL THERAPIST

## 2024-10-01 PROCEDURE — 97530 THERAPEUTIC ACTIVITIES: CPT | Performed by: PHYSICAL THERAPIST

## 2024-10-01 NOTE — PROGRESS NOTES
Physical Therapy Daily Treatment Note    Patient: Nabor Muhammad Dr.   : 1955  Referring practitioner: JASSI Smith  Date of Initial Visit: Type: THERAPY  Noted: 2024  Today's Date: 10/1/2024  Patient seen for 24 sessions       Visit Diagnoses:    ICD-10-CM ICD-9-CM   1. Difficulty walking due to knee joint  R26.2 719.7   2. Decreased ROM of left knee  M25.662 719.56   3. S/P total knee arthroplasty, left  Z96.652 V43.65   4. Acute pain of left knee  M25.562 719.46       Nabor Muhammad Dr. reports: his knee felt pretty good over the weekend.  It still hurts at night.      Subjective       Objective          Active Range of Motion   Left Knee   Flexion: 120 degrees   Extension: 0 degrees     Additional Active Range of Motion Details  Left knee flex AAROM 120 degrees       See Exercise, Manual, and Modality Logs for complete treatment.       Assessment & Plan       Assessment  Assessment details: Pt is doing well with therapy.  Pt is reaching 120 degrees of knee flexion with minimal stretching.  Pt still has left knee pain especially at night.  Pt tolerates all stretches and strengthening exercises.  Increased weight with leg press and added cable with TKE.  Ended treatment with IFC and cold pack to decrease knee effusion and pain.   Will continue to see pt 2x/week for stretching, strengthening, and modalities PRN for pain.          Progress per Plan of Care      Timed:         Manual Therapy:    4    mins  48344;     Therapeutic Exercise:    24     mins  48637;     Neuromuscular Tyrone:        mins  36303;    Therapeutic Activity:     10     mins  64597;     Gait Training:           mins  79734;     Ultrasound:          mins  69035;    Ionto                                   mins   19341  Self Care                            mins   52600  [employer]         mins 13226      Un-Timed:  Electrical Stimulation:    15     mins  62963 ( );  Dry Needling          mins self-pay  Traction           mins 87033      Timed Treatment:  38    mins   Total Treatment:     63   mins    Graciela Trejo, PT  [place] License: [id number]

## 2024-10-04 ENCOUNTER — TREATMENT (OUTPATIENT)
Dept: PHYSICAL THERAPY | Facility: CLINIC | Age: 69
End: 2024-10-04
Payer: COMMERCIAL

## 2024-10-04 DIAGNOSIS — M25.662 DECREASED ROM OF LEFT KNEE: ICD-10-CM

## 2024-10-04 DIAGNOSIS — R26.2 DIFFICULTY WALKING DUE TO KNEE JOINT: Primary | ICD-10-CM

## 2024-10-04 DIAGNOSIS — Z96.652 S/P TOTAL KNEE ARTHROPLASTY, LEFT: ICD-10-CM

## 2024-10-04 DIAGNOSIS — M25.562 ACUTE PAIN OF LEFT KNEE: ICD-10-CM

## 2024-10-04 NOTE — PROGRESS NOTES
Physical Therapy Daily Treatment Note    Patient: Nabor Muhammad Dr.   : 1955  Referring practitioner: JASSI Smith  Date of Initial Visit: Type: THERAPY  Noted: 2024  Today's Date: 10/4/2024  Patient seen for 25 sessions       Visit Diagnoses:    ICD-10-CM ICD-9-CM   1. Difficulty walking due to knee joint  R26.2 719.7   2. Decreased ROM of left knee  M25.662 719.56   3. S/P total knee arthroplasty, left  Z96.652 V43.65   4. Acute pain of left knee  M25.562 719.46       Nabor Muhammad Dr. reports: he still has knee pain and swelling.  Pt reports he is really busy at work and on his feet.  Pt does not have time when he gets home to elevate his knee and ice.  Pt states his left quad is  and sore.      Subjective       Objective          Active Range of Motion   Left Knee   Flexion: 120 degrees   Extension: 0 degrees     Additional Active Range of Motion Details  Pt's left knee AAROM knee flex 120 degrees      See Exercise, Manual, and Modality Logs for complete treatment.       Assessment & Plan       Assessment  Assessment details: Pt is tolerating all stretches and strengthening exercises.  Pt  to palpation over the left lateral quad to palpation.  Pt has good AAROM in flexion with some passive knee flexion.  Pt is reaching end ranges with less effort.  Pt is able to perform step downs on an 8 in step with mild difficulty for improved tolerance to descending steps with normal gait pattern.  Ended treatment with IFC and cold pack to left knee to decrease tenderness and swelling.  Will continue to see pt 2x/week for stretching, strengthening, and modalities PRN for pain.          Progress per Plan of Care      Timed:         Manual Therapy:    6     mins  44701;     Therapeutic Exercise:   26      mins  34625;     Neuromuscular Tyrone:        mins  84475;    Therapeutic Activity:    18      mins  74430;     Gait Training:           mins  49793;     Ultrasound:           mins  44454;    Ionto                                   mins   23186  Self Care                            mins   69503  Canalith Repos         mins 60023      Un-Timed:  Electrical Stimulation:    15     mins  03534 ( );  Dry Needling          mins self-pay  Traction          mins 78568      Timed Treatment:  50    mins   Total Treatment:    74    mins    Graciela Trejo, PT  [place] License: [id number]

## 2024-10-09 ENCOUNTER — OFFICE VISIT (OUTPATIENT)
Dept: ORTHOPEDIC SURGERY | Facility: CLINIC | Age: 69
End: 2024-10-09
Payer: COMMERCIAL

## 2024-10-09 VITALS — WEIGHT: 274 LBS | BODY MASS INDEX: 38.36 KG/M2 | HEIGHT: 71 IN

## 2024-10-09 DIAGNOSIS — Z96.652 STATUS POST TOTAL LEFT KNEE REPLACEMENT: Primary | ICD-10-CM

## 2024-10-09 PROCEDURE — 99024 POSTOP FOLLOW-UP VISIT: CPT | Performed by: ORTHOPAEDIC SURGERY

## 2024-10-09 NOTE — PROGRESS NOTES
CC: s/p left total knee arthroplasty, DOS 7/23/2024  Interval History: Nabor Muhammad Dr. returns for 11 week postoperative visit stating that he had been doing very well the last week he had an episode where he was bringing his knee to midline with a slight valgus stress on his knee and felt a pop over the lateral aspect of his knee.  Since that time he said some increased pain as well as swelling to his knee and difficulty with stiffness particularly getting his knee flexion improved again.  He had gotten up to 120 degrees of flexion.  He started prednisone taper about 3 to 4 days ago and has noted some mild improvement since then.  Denies any new numbness or tingling.  Has noted some swelling extending down into his leg and the bottom of his foot.  He has been tolerating stairs fairly well up until this recent episode.  Denies any hip or groin pain denies any low back pain.    Physical Examination: Left knee was examined   Incision well healed   ROM 0-100,  4+/5 strength   Stable to varus and valgus stress   Flex/extend ankle and toes   Positive sensation left foot     Assessment/Plan:  Recommend soft tissue work with physical therapy, recommend consideration for KT taping.  Continue his prednisone taper with the hope that this continues to improve with his swelling.  Recommended soft tissue massage and knee mobility as tolerated to try to help improve motion and flexibility  We will continue outpatient therapy for range of motion, strengthening, and gait normalization.   Follow-up in 3 months repeat x-rays left knee or sooner if needed  Patient had all question answered today  Discussed need for prophylactic antibiotics with dental/endoscopy procedures.     Medications:  No orders of the defined types were placed in this encounter.      Terry Zarate MD

## 2024-10-10 DIAGNOSIS — Z96.652 STATUS POST TOTAL LEFT KNEE REPLACEMENT: ICD-10-CM

## 2024-10-10 RX ORDER — PREGABALIN 75 MG/1
75 CAPSULE ORAL EVERY 12 HOURS SCHEDULED
Qty: 60 CAPSULE | Refills: 0 | Status: SHIPPED | OUTPATIENT
Start: 2024-10-10

## 2024-10-10 NOTE — TELEPHONE ENCOUNTER
Rx Refill Note  Requested Prescriptions     Pending Prescriptions Disp Refills    pregabalin (LYRICA) 75 MG capsule 60 capsule 0     Sig: Take 1 capsule by mouth Every 12 (Twelve) Hours.      Last office visit with prescribing clinician: 10/9/2024      Next office visit with prescribing clinician: Visit date not found   Last Filled: 7/24/2024    Encounter Diagnosis   Name Primary?    Status post total left knee replacement       Date of Surgery: 7/23/2024      Jenni Reed MA  10/10/24, 08:34 EDT    Previous RX pended for your approval, change or denial.     {TIP  Encounters:    {TIP  Please add Last Relevant Lab Date if appropriate:  {TIP  Is Refill Pharmacy correct?:

## 2024-10-11 ENCOUNTER — TREATMENT (OUTPATIENT)
Dept: PHYSICAL THERAPY | Facility: CLINIC | Age: 69
End: 2024-10-11
Payer: COMMERCIAL

## 2024-10-11 DIAGNOSIS — M25.662 DECREASED ROM OF LEFT KNEE: ICD-10-CM

## 2024-10-11 DIAGNOSIS — Z96.652 S/P TOTAL KNEE ARTHROPLASTY, LEFT: ICD-10-CM

## 2024-10-11 DIAGNOSIS — M25.562 ACUTE PAIN OF LEFT KNEE: ICD-10-CM

## 2024-10-11 DIAGNOSIS — R26.2 DIFFICULTY WALKING DUE TO KNEE JOINT: Primary | ICD-10-CM

## 2024-10-11 NOTE — PROGRESS NOTES
Physical Therapy Daily Treatment Note      Patient: Nabor Muhammad Dr.   : 1955  Referring practitioner: Terry Zarate MD  Date of Initial Visit: Type: THERAPY  Noted: 2024  Today's Date: 10/11/2024  Patient seen for 26 sessions       Visit Diagnoses:    ICD-10-CM ICD-9-CM   1. Difficulty walking due to knee joint  R26.2 719.7   2. Decreased ROM of left knee  M25.662 719.56   3. S/P total knee arthroplasty, left  Z96.652 V43.65   4. Acute pain of left knee  M25.562 719.46       Nabor Muhammad Dr. reports: his knee is better than it was this weekend, but it popped when he turned over in bed.  It has been more painful, swollen and he can't bend it as much now.  Pt states he has been started on a steroid.  Pt saw MD on Wed.      Subjective       Objective          Active Range of Motion   Left Knee   Flexion: 106 degrees     Additional Active Range of Motion Details  Supine left knee AAROM 108 degrees limited by pain       See Exercise, Manual, and Modality Logs for complete treatment.     Md recommended KT taping and soft tissue     Assessment & Plan       Assessment  Assessment details: Pt with increased pain, tenderness over the left lateral knee joint, mild IT band tightness, and limited ROM today since his knee popped over the weekend.  Modified treatment today per MD orders and pt's tolerance.  Focused pt's treatment today on ROM stretching, hamstring/IT band stretching, passive knee flexion stretches, and soft tissue massage over the left distal lateral knee to decrease distal IT band tightness.  Pt tolerated deep tissue massage, but was tender and painful.  Ended treatment with IFC and cold pack to decrease knee effusion and pain.  KT taped left distal IT band for pain relief and decreased tightness.          Progress per Plan of Care      Timed:         Manual Therapy:    20     mins  05010;     Therapeutic Exercise:   10      mins  14636;     Neuromuscular Tyrone:        mins  99559;     Therapeutic Activity:          mins  47684;     Gait Training:           mins  46125;     Ultrasound:          mins  84531;    Ionto                                   mins   34166  Self Care                            mins   00936  Canalith [employer]         mins 79964      Un-Timed:  Electrical Stimulation:    15     mins  94038 ( );  Dry Needling          mins self-pay  Traction          mins 60714      Timed Treatment:  30    mins   Total Treatment:     45   mins    Graciela Trejo, PT  [place] License: [id number]

## 2024-10-15 ENCOUNTER — TREATMENT (OUTPATIENT)
Dept: PHYSICAL THERAPY | Facility: CLINIC | Age: 69
End: 2024-10-15
Payer: COMMERCIAL

## 2024-10-15 DIAGNOSIS — R26.2 DIFFICULTY WALKING DUE TO KNEE JOINT: Primary | ICD-10-CM

## 2024-10-15 DIAGNOSIS — Z96.652 S/P TOTAL KNEE ARTHROPLASTY, LEFT: ICD-10-CM

## 2024-10-15 DIAGNOSIS — M25.562 ACUTE PAIN OF LEFT KNEE: ICD-10-CM

## 2024-10-15 DIAGNOSIS — Z96.659 HISTORY OF KNEE REPLACEMENT, UNSPECIFIED LATERALITY: Primary | ICD-10-CM

## 2024-10-15 DIAGNOSIS — M25.662 DECREASED ROM OF LEFT KNEE: ICD-10-CM

## 2024-10-15 RX ORDER — AMOXICILLIN 500 MG/1
2000 CAPSULE ORAL ONCE
Qty: 4 CAPSULE | Refills: 0 | Status: SHIPPED | OUTPATIENT
Start: 2024-10-15 | End: 2024-10-15

## 2024-10-15 NOTE — PROGRESS NOTES
Physical Therapy Daily Treatment Note      Patient: Nabor Muhammad Dr.   : 1955  Referring practitioner: Terry Zarate MD  Date of Initial Visit: Type: THERAPY  Noted: 2024  Today's Date: 10/15/2024  Patient seen for 27 sessions       Visit Diagnoses:    ICD-10-CM ICD-9-CM   1. Difficulty walking due to knee joint  R26.2 719.7   2. Decreased ROM of left knee  M25.662 719.56   3. S/P total knee arthroplasty, left  Z96.652 V43.65   4. Acute pain of left knee  M25.562 719.46       Nabor Muhammad Dr. reports: he is feeling better since Friday's treatment.  Pt states he was very active over the weekend.      Subjective       Objective          Active Range of Motion   Left Knee   Flexion: 115 degrees       See Exercise, Manual, and Modality Logs for complete treatment.     Mild tenderness over the left distal IT band and lateral quad.      Assessment & Plan       Assessment  Assessment details: Pt with decreasing pain, tightness and improved left knee AROM since last visit.  Continued with passive stretching, deep tissue massage, KT taping, IFC and cold pack treatment to decrease pt's pain, tightness and tenderness over the left lateral knee joint.  Pt reached 115 degrees of active knee flexion AROM after doing some joint mobs and passive knee stretching.  Pt tolerated riding the bike today and completed full revolutions.  Held all strengthening exercises until pt's symptoms improve. Will continue to see pt 2x/week for stretching, manual therapy and modalities PRN for pain relief.             Progress per Plan of Care      Timed:         Manual Therapy:    17     mins  86418;     Therapeutic Exercise:    8     mins  60324;     Neuromuscular Tyrone:        mins  94193;    Therapeutic Activity:          mins  76736;     Gait Training:           mins  00505;     Ultrasound:          mins  23771;    Ionto                                   mins   88710  Self Care                            mins    92755  [facility]         mins 52227      Un-Timed:  Electrical Stimulation:    15     mins  09294 ( );  Dry Needling          mins self-pay  Traction          mins 09465      Timed Treatment:   25   mins   Total Treatment:     60   mins    Graciela Trejo, PT  [place] License: [id number]

## 2024-10-18 ENCOUNTER — TREATMENT (OUTPATIENT)
Dept: PHYSICAL THERAPY | Facility: CLINIC | Age: 69
End: 2024-10-18
Payer: COMMERCIAL

## 2024-10-18 DIAGNOSIS — Z96.652 S/P TOTAL KNEE ARTHROPLASTY, LEFT: ICD-10-CM

## 2024-10-18 DIAGNOSIS — R26.2 DIFFICULTY WALKING DUE TO KNEE JOINT: Primary | ICD-10-CM

## 2024-10-18 DIAGNOSIS — M25.562 ACUTE PAIN OF LEFT KNEE: ICD-10-CM

## 2024-10-18 DIAGNOSIS — M25.662 DECREASED ROM OF LEFT KNEE: ICD-10-CM

## 2024-10-18 NOTE — PROGRESS NOTES
Physical Therapy Daily Treatment Note    Patient: Nabor Muhammad Dr.   : 1955  Referring practitioner: Terry Zarate MD  Date of Initial Visit: Type: THERAPY  Noted: 2024  Today's Date: 10/18/2024  Patient seen for 28 sessions       Visit Diagnoses:    ICD-10-CM ICD-9-CM   1. Difficulty walking due to knee joint  R26.2 719.7   2. Decreased ROM of left knee  M25.662 719.56   3. S/P total knee arthroplasty, left  Z96.652 V43.65   4. Acute pain of left knee  M25.562 719.46       Nabor Muhammad Dr. Reports:  he had a better Wed and Thurs at work.  It still swells and feels tight.  It hurts on the outside of the knee when he turns over in bed.       Subjective       Objective          Active Range of Motion   Left Knee   Flexion: 117 degrees       See Exercise, Manual, and Modality Logs for complete treatment.     Moderate left lateral knee joint line tenderness over the distal LCL and IT band.        Assessment & Plan       Assessment  Assessment details: Pt is improving with PT.  Pt reports less pain, decreasing palpable tightness and tenderness over the lateral quad and knee joint line, and improving knee AROM.  Pt reached 117 degrees of active knee flexion AROM after passive knee stretching.  Held all strengthening exercises again today.  Pt tolerated all stretching exercises with minimal discomfort.  Ended treatment with IFC and Cold pack to decrease pain, tenderness and knee effusion.  Used KT taping over left lateral knee joint line for pain relief.  Pt is going to continue at home next week with HEP.  Will F/U with pt on 10/30.          Progress per Plan of Care      Timed:         Manual Therapy:    17     mins  17412;     Therapeutic Exercise:    8     mins  44242;     Neuromuscular Tyrone:        mins  04296;    Therapeutic Activity:          mins  75054;     Gait Training:           mins  50027;     Ultrasound:          mins  44697;    Ionto                                   mins    29587  Self Care                            mins   32470  Can[employer]ith [employer]         mins 30138      Un-Timed:  Electrical Stimulation:   15      mins  36089 ( );  Dry Needling          mins self-pay  Traction          mins 71706      Timed Treatment:  25    mins   Total Treatment:    50    mins    Graciela Trejo, PT  [place] License: [id number]

## 2024-10-30 ENCOUNTER — TREATMENT (OUTPATIENT)
Dept: PHYSICAL THERAPY | Facility: CLINIC | Age: 69
End: 2024-10-30
Payer: COMMERCIAL

## 2024-10-30 DIAGNOSIS — R26.2 DIFFICULTY WALKING DUE TO KNEE JOINT: Primary | ICD-10-CM

## 2024-10-30 DIAGNOSIS — M25.662 DECREASED ROM OF LEFT KNEE: ICD-10-CM

## 2024-10-30 DIAGNOSIS — Z96.652 S/P TOTAL KNEE ARTHROPLASTY, LEFT: ICD-10-CM

## 2024-10-30 DIAGNOSIS — M25.562 ACUTE PAIN OF LEFT KNEE: ICD-10-CM

## 2024-10-30 NOTE — PROGRESS NOTES
Physical Therapy Recertification     [facility]  [address] [place] [address]  [phone] (phone)  [phone] (fax)    Patient: Nabor Muhammad Dr.   : 1955  Diagnosis/ICD-10 Code:  Difficulty walking due to knee joint [R26.2]  Referring practitioner: JASSI Smith  Date of Initial Visit: 10/30/2024  Today's Date: 10/30/2024  Patient seen for 29 sessions           Subjective Questionnaire: LEFS: 44/80  Clinical Progress: improved  Home Program Compliance: Yes  Treatment has included: therapeutic exercise, neuromuscular re-education, manual therapy, therapeutic activity, electrical stimulation, ultrasound, dry needling, cryotherapy, and pt has been instructed in a HEP.      Subjective   Knee was doing really well last week. Got off work early and used my peloton. Also rode the bike at home a lot last week. Knee swelled back up on Monday after a 12 hour shift    Objective     Active Range of Motion   Left Knee   Flexion: 115 degrees   Extension: 0 degrees         Ambulation      Observational Gait   Gait: antalgic   Walking speed and left swing time within functional limits. Decreased stride length, left stance time and left step length.   Left foot contact pattern: heel to toe     Additional Observational Gait Details  Pt still ambulates with a minimal antalgic gait left LE without an AD         Swelling      Left Knee Girth Measurement (cm)   Joint line: 45 cm.   10 cm above joint line: sup patella- 52 cm.  10 cm below joint line: inf patella- 41 cm.       See Exercise, Manual, and Modality Logs for complete treatment.          Assessment/Plan  Nabor Muhammad Dr. has been seen for 29 physical therapy sessions for L knee pain s/p TKA on 24. Progress to physical therapy goals is good. Patient is easily tolerating all strengthening exercises and is more easily achieving full revolutions on the bike. He still occasionally presents with gait stiffness/antalgia but  it improves quickly once his L knee loosens up on him. Knee stiffness and swelling seem to exacerbate if he has to work a longer shift (12 hour day). He is able to walk up stairs reciprocally consistently but when descending he tends to start off with a step to pattern. He will benefit from continued skilled physical therapy to address remaining impairments and functional limitations.      Goals  Plan Goals: STGs: 2-4 weeks  1. Decrease left knee pain to 5/10 with standing and ambulation.  MET   2. Increase left knee AROM 0-115 degrees for improved ability to dress his left LE.  MET  3.  Decrease left medial/lateral knee tenderness to minimal to none for improved ability to perform his ADLs.   PARTIALLY MET - not consistently  4.  Pt ambulates into clinic without an AD minimal antalgic gait left LE for improved balance and functional mobility  MET   5.  Decrease left knee effusion at girth measurements by 1-2 cm for improved ROM and ability to dress his left LE.  ONGOING- no significant changes     LTGs: 5-8 weeks  1.  Pt Independent with HEP for self management of symptoms. MET  2.  Increase left knee AROM 0-125 degrees for improved ability to get into and out of his car and perform his ADLs.  PROGRESSING   3.  Increase left knee and hip strength to 5/5 for improved ability to stand, ambulate and climb steps.   PARTIALLY MET   4.  Pt ambulates left LE without an antalgic gait for improved mobility and balance to return to work  PROGRESSING - occurs occasionally     Recommendations: Continue as planned  Timeframe: 1 month; 2x/week  Prognosis to achieve goals: good        Timed:  Manual Therapy:    2     mins  69926;  Therapeutic Exercise:    23     mins  01703;     Neuromuscular Tyrone:        mins  54113;    Therapeutic Activity:     10     mins  50716;     Gait Training:           mins  74366;     Ultrasound:          mins  34773;    Electrical Stimulation:         mins  37314 ( );  Iontophoresis         mins  78300    Untimed:  Electrical Stimulation:    15     mins  34287 ( );  Traction:         mins  37533;   Dry Needling   (1-2 muscles)       mins 20560 (Self-pay)  Dry Needling (3-4 muscles)        mins 20561 (Self-pay)  Dry Needling Trial          mins DRYNDLTRIAL  (No Charge)    Timed Treatment:   35   mins   Total Treatment:     50   mins    PT SIGNATURE: MAHAMED Jain PT License Number: [id number]    Electronically signed by Lissa Albert PT, 10/30/24, 11:51 AM EDT]    DATE TREATMENT INITIATED: 10/30/2024    90 Day Recertification  Certification Period: 1/28/2025  I certify that the therapy services are furnished while this patient is under my care.  The services outlined above are required by this patient, and will be reviewed every 90 days.     PHYSICIAN: Kya Burns APRN   NPI: [id number]                                         DATE:

## 2024-11-01 ENCOUNTER — TREATMENT (OUTPATIENT)
Dept: PHYSICAL THERAPY | Facility: CLINIC | Age: 69
End: 2024-11-01
Payer: COMMERCIAL

## 2024-11-01 DIAGNOSIS — M25.662 DECREASED ROM OF LEFT KNEE: ICD-10-CM

## 2024-11-01 DIAGNOSIS — M25.562 ACUTE PAIN OF LEFT KNEE: ICD-10-CM

## 2024-11-01 DIAGNOSIS — R26.2 DIFFICULTY WALKING DUE TO KNEE JOINT: Primary | ICD-10-CM

## 2024-11-01 DIAGNOSIS — Z96.652 S/P TOTAL KNEE ARTHROPLASTY, LEFT: ICD-10-CM

## 2024-11-01 RX ORDER — PREDNISONE 10 MG/1
TABLET ORAL
Qty: 39 TABLET | Refills: 0 | Status: SHIPPED | OUTPATIENT
Start: 2024-11-01

## 2024-11-01 NOTE — PROGRESS NOTES
Physical Therapy Daily Treatment Note    Patient: Nabor Muhammad Dr.   : 1955  Referring practitioner: JASSI Smith  Date of Initial Visit: Type: THERAPY  Noted: 3/8/2024  Today's Date: 2024  Patient seen for 17 sessions       Visit Diagnoses:    ICD-10-CM ICD-9-CM   1. Difficulty walking due to knee joint  R26.2 719.7   2. Decreased ROM of left knee  M25.662 719.56   3. S/P total knee arthroplasty, left  Z96.652 V43.65   4. Acute pain of left knee  M25.562 719.46       Nabor Muhammad Dr. reports: his left foot has been bothering him more since Wednesday treatment.  Pt states he did not do anything different than he normally does, but he noticed more pain, swelling, and some redness around his left metatarsal head of his 2nd/3rd toe.  Pt states it is sore to touch on the bottom of his foot.     Subjective       Objective          Observations   Left Ankle/Foot   Positive for effusion.     Additional Ankle/Foot Observation Details  Isolated plantar surface of foot/2nd and 3rd metatarsals observable swelling and slight erythremia noticed      Tenderness   Left Ankle/Foot   Tenderness in the metatarsal head.     Additional Tenderness Details  Left 2nd/3rd plantar surface of foot isolated tenderness to palpation         Active Range of Motion   Left Knee   Flexion: 117 degrees     Additional Active Range of Motion Details  120 degrees     Tests   Left Ankle/Foot   Negative for metatarsal squeeze.       See Exercise, Manual, and Modality Logs for complete treatment.     Held CKC exercises due to pt's c/o left foot pain     Assessment & Plan       Assessment  Assessment details: Pt continues to c/o left knee pain and swelling depending on his activity level.  Pt with good left knee AROM requiring some passive stretching prior to measurement to reach end ranges.  Pt did reach 120 degrees of assisted knee flexion today with minimal c/o discomfort.  Pt tolerated all OKC strengthening exercises without  difficulty.  Held CKC exercises due to pt's left foot pain and swelling.  Pt completed full revolutions forward and backwards on bike.  Pt still has point tenderness over the left LCL, so continued with KT taping for pain control and support over the lateral knee joint.  Ended treatment with IFC and cold packs to decrease pain, tenderness and swelling.          Progress per Plan of Care      Timed:         Manual Therapy:    8     mins  44784;     Therapeutic Exercise:   25      mins  04940;     Neuromuscular Tyrone:        mins  85889;    Therapeutic Activity:          mins  37122;     Gait Training:           mins  72647;     Ultrasound:          mins  34114;    Ionto                                   mins   44833  Self Care                            mins   07021  Canalith Repos         mins 59219      Un-Timed:  Electrical Stimulation:   15      mins  96960 ( );  Dry Needling          mins self-pay  Traction          mins 28070      Timed Treatment:  33    mins   Total Treatment:     70   mins    Graciela Trejo PT  [place] License: [id number]

## 2024-11-04 DIAGNOSIS — M79.672 ACUTE FOOT PAIN, LEFT: Primary | ICD-10-CM

## 2024-11-05 ENCOUNTER — TREATMENT (OUTPATIENT)
Dept: PHYSICAL THERAPY | Facility: CLINIC | Age: 69
End: 2024-11-05
Payer: COMMERCIAL

## 2024-11-05 ENCOUNTER — HOSPITAL ENCOUNTER (OUTPATIENT)
Dept: GENERAL RADIOLOGY | Facility: HOSPITAL | Age: 69
Discharge: HOME | End: 2024-11-05
Admitting: NURSE PRACTITIONER
Payer: COMMERCIAL

## 2024-11-05 DIAGNOSIS — M25.662 DECREASED ROM OF LEFT KNEE: ICD-10-CM

## 2024-11-05 DIAGNOSIS — M79.89 FOOT SWELLING: ICD-10-CM

## 2024-11-05 DIAGNOSIS — Z96.652 S/P TOTAL KNEE ARTHROPLASTY, LEFT: ICD-10-CM

## 2024-11-05 DIAGNOSIS — M79.672 ACUTE FOOT PAIN, LEFT: Primary | ICD-10-CM

## 2024-11-05 DIAGNOSIS — M79.672 ACUTE FOOT PAIN, LEFT: ICD-10-CM

## 2024-11-05 DIAGNOSIS — R26.2 DIFFICULTY WALKING DUE TO KNEE JOINT: Primary | ICD-10-CM

## 2024-11-05 DIAGNOSIS — Z96.652 STATUS POST TOTAL LEFT KNEE REPLACEMENT: ICD-10-CM

## 2024-11-05 PROCEDURE — 73630 X-RAY EXAM OF FOOT: CPT

## 2024-11-05 NOTE — PROGRESS NOTES
Physical Therapy Daily Treatment Note    [facility]  [address] [place] [address]  [phone] (phone)  [phone] (fax)    Patient: Nabor Muhammad Dr.   : 1955  Diagnosis/ICD-10 Code:  Difficulty walking due to knee joint [R26.2]  Referring practitioner: JASSI Smith  Date of Initial Visit: Type: THERAPY  Noted: 2024  Today's Date: 2024  Patient seen for 30 sessions           Subjective   L foot has been swollen and hurting. Started Prednisone last Friday which helped a bit. Also just had X-Ray to rule out fracture (which was negative). Had a blood draw to check for possible gout.     Objective     See Exercise, Manual, and Modality Logs for complete treatment.       Passive Range of Motion   Left Knee   Flexion: 120 degrees   Extension: 0 degrees     Assessment/Plan  Continued to avoid closed chain strengthening due to L foot pain. Knee pain is improving overall and he is having less stiffness, however, it still tends to flare up with prolonged periods of standing at work. Patient was able to achieve 120 degrees of L knee flexion today after manual stretches.          Timed:    Manual Therapy:    2     mins  67038;  Therapeutic Exercise:    25     mins  02379;     Neuromuscular Tyrone:        mins  42237;    Therapeutic Activity:     8     mins  69345;     Gait Training:           mins  11164;     Ultrasound:          mins  69256;    Electrical Stimulation:         mins  77585 ( );  Iontophoresis         mins 27042;  Aquatic Therapy         mins 43130;    Untimed:  Electrical Stimulation:    15     mins  54880 ( );  Traction:         mins  02705;   Dry Needling   (1-2 muscles)       mins  (Self-pay)  Dry Needling (3-4 muscles)        mins  (Self-pay)  Dry Needling Trial          mins DRYNDLTRIAL  (No Charge)    Timed Treatment:   35   mins   Total Treatment:     50   mins    Lissa Albert PT  Physical Therapist    [place]  License:[id number]

## 2024-11-06 LAB
ALBUMIN SERPL-MCNC: 4.6 G/DL (ref 3.9–4.9)
ALP SERPL-CCNC: 77 IU/L (ref 44–121)
ALT SERPL-CCNC: 30 IU/L (ref 0–44)
AST SERPL-CCNC: 24 IU/L (ref 0–40)
BASOPHILS # BLD AUTO: 0 X10E3/UL (ref 0–0.2)
BASOPHILS NFR BLD AUTO: 0 %
BILIRUB SERPL-MCNC: 0.5 MG/DL (ref 0–1.2)
BUN SERPL-MCNC: 29 MG/DL (ref 8–27)
BUN/CREAT SERPL: 22 (ref 10–24)
CALCIUM SERPL-MCNC: 9.5 MG/DL (ref 8.6–10.2)
CHLORIDE SERPL-SCNC: 101 MMOL/L (ref 96–106)
CO2 SERPL-SCNC: 22 MMOL/L (ref 20–29)
CREAT SERPL-MCNC: 1.34 MG/DL (ref 0.76–1.27)
EGFRCR SERPLBLD CKD-EPI 2021: 57 ML/MIN/1.73
EOSINOPHIL # BLD AUTO: 0 X10E3/UL (ref 0–0.4)
EOSINOPHIL NFR BLD AUTO: 0 %
ERYTHROCYTE [DISTWIDTH] IN BLOOD BY AUTOMATED COUNT: 13.9 % (ref 11.6–15.4)
ERYTHROCYTE [SEDIMENTATION RATE] IN BLOOD BY WESTERGREN METHOD: 15 MM/HR (ref 0–30)
GLOBULIN SER CALC-MCNC: 2.5 G/DL (ref 1.5–4.5)
GLUCOSE SERPL-MCNC: 113 MG/DL (ref 70–99)
HCT VFR BLD AUTO: 47.4 % (ref 37.5–51)
HGB BLD-MCNC: 15.4 G/DL (ref 13–17.7)
IMM GRANULOCYTES # BLD AUTO: 0.1 X10E3/UL (ref 0–0.1)
IMM GRANULOCYTES NFR BLD AUTO: 1 %
LYMPHOCYTES # BLD AUTO: 1.4 X10E3/UL (ref 0.7–3.1)
LYMPHOCYTES NFR BLD AUTO: 16 %
MCH RBC QN AUTO: 28.6 PG (ref 26.6–33)
MCHC RBC AUTO-ENTMCNC: 32.5 G/DL (ref 31.5–35.7)
MCV RBC AUTO: 88 FL (ref 79–97)
MONOCYTES # BLD AUTO: 0.6 X10E3/UL (ref 0.1–0.9)
MONOCYTES NFR BLD AUTO: 7 %
NEUTROPHILS # BLD AUTO: 6.9 X10E3/UL (ref 1.4–7)
NEUTROPHILS NFR BLD AUTO: 76 %
PLATELET # BLD AUTO: 268 X10E3/UL (ref 150–450)
POTASSIUM SERPL-SCNC: 3.7 MMOL/L (ref 3.5–5.2)
PROT SERPL-MCNC: 7.1 G/DL (ref 6–8.5)
RBC # BLD AUTO: 5.39 X10E6/UL (ref 4.14–5.8)
SODIUM SERPL-SCNC: 139 MMOL/L (ref 134–144)
URATE SERPL-MCNC: 8.2 MG/DL (ref 3.8–8.4)
WBC # BLD AUTO: 9.1 X10E3/UL (ref 3.4–10.8)

## 2024-11-06 RX ORDER — PREGABALIN 75 MG/1
75 CAPSULE ORAL EVERY 12 HOURS SCHEDULED
Qty: 60 CAPSULE | Refills: 0 | Status: SHIPPED | OUTPATIENT
Start: 2024-11-06

## 2024-11-06 NOTE — TELEPHONE ENCOUNTER
Rx Refill Note  Requested Prescriptions     Pending Prescriptions Disp Refills    pregabalin (LYRICA) 75 MG capsule 60 capsule 0     Sig: Take 1 capsule by mouth Every 12 (Twelve) Hours.      Last office visit with prescribing clinician: 10/9/2024      Next office visit with prescribing clinician: 1/6/2025   Last Filled:10/10/24    Encounter Diagnosis   Name Primary?    Status post total left knee replacement       Date of Surgery: s/p left total knee arthroplasty, DOS 7/23/2024       Chastity Birmingham MA  11/06/24, 15:58 EST    Previous RX pended for your approval, change or denial.     {TIP  Encounters:    {TIP  Please add Last Relevant Lab Date if appropriate:  {TIP  Is Refill Pharmacy correct?:

## 2024-11-07 DIAGNOSIS — S92.309A CLOSED NONDISPLACED FRACTURE OF METATARSAL BONE, UNSPECIFIED LATERALITY, UNSPECIFIED METATARSAL, INITIAL ENCOUNTER: Primary | ICD-10-CM

## 2024-11-15 ENCOUNTER — TREATMENT (OUTPATIENT)
Dept: PHYSICAL THERAPY | Facility: CLINIC | Age: 69
End: 2024-11-15
Payer: COMMERCIAL

## 2024-11-15 DIAGNOSIS — M25.562 ACUTE PAIN OF LEFT KNEE: ICD-10-CM

## 2024-11-15 DIAGNOSIS — Z96.652 S/P TOTAL KNEE ARTHROPLASTY, LEFT: ICD-10-CM

## 2024-11-15 DIAGNOSIS — M25.662 DECREASED ROM OF LEFT KNEE: ICD-10-CM

## 2024-11-15 DIAGNOSIS — R26.2 DIFFICULTY WALKING DUE TO KNEE JOINT: Primary | ICD-10-CM

## 2024-11-15 NOTE — PROGRESS NOTES
Physical Therapy Daily Treatment Note    Patient: Nabor Muhammad Dr.   : 1955  Referring practitioner: JASSI Smith  Date of Initial Visit: Type: THERAPY  Noted: 2024  Today's Date: 11/15/2024  Patient seen for 31 sessions       Visit Diagnoses:    ICD-10-CM ICD-9-CM   1. Difficulty walking due to knee joint  R26.2 719.7   2. Decreased ROM of left knee  M25.662 719.56   3. S/P total knee arthroplasty, left  Z96.652 V43.65   4. Acute pain of left knee  M25.562 719.46       Nabor Muhammad Dr. reports: his foot is feeling better.  Took cast shoe off to just come do his therapy.  Pt states his knee is better and he feels he is able to bend easier.      Subjective       Objective          Active Range of Motion     Additional Active Range of Motion Details   Left knee supine knee AAROM 120 degrees       See Exercise, Manual, and Modality Logs for complete treatment.       Assessment & Plan       Assessment  Assessment details: Pt is doing much better and requiring less effort and pain to reach his end ranges of knee flexion AROM.  Pt reports his left foot pain and swelling is improving. Continued to hold WB strengthening exercises due to his left metatarsal Fxs.  Pt performed all OKC strengthening exercises without pain.  Ended treatment with IFC and cold to decrease pain and swelling in left knee.  Will continue to see pt once per week for stretching and strengthening exercises per pt's tolerance.  Plan for discharge to a [facility] soon.          Progress per Plan of Care      Timed:         Manual Therapy:    8     mins  95477;     Therapeutic Exercise:    31     mins  45930;     Neuromuscular Tyrone:        mins  25327;    Therapeutic Activity:          mins  22858;     Gait Training:           mins  40323;     Ultrasound:          mins  40778;    Ionto                                   mins   21286  Self Care                            mins   17366  Canalith Repos         mins  84257      Un-Timed:  Electrical Stimulation:  15       mins  62670 ( );  Dry Needling          mins self-pay  Traction          mins 98306      Timed Treatment:  39    mins   Total Treatment:    60    mins    Graciela Trejo, PT  [place] License: [id number]

## 2024-11-22 ENCOUNTER — TREATMENT (OUTPATIENT)
Dept: PHYSICAL THERAPY | Facility: CLINIC | Age: 69
End: 2024-11-22
Payer: COMMERCIAL

## 2024-11-22 DIAGNOSIS — M25.662 DECREASED ROM OF LEFT KNEE: ICD-10-CM

## 2024-11-22 DIAGNOSIS — R26.2 DIFFICULTY WALKING DUE TO KNEE JOINT: Primary | ICD-10-CM

## 2024-11-22 DIAGNOSIS — Z96.652 S/P TOTAL KNEE ARTHROPLASTY, LEFT: ICD-10-CM

## 2024-11-22 DIAGNOSIS — M25.562 ACUTE PAIN OF LEFT KNEE: ICD-10-CM

## 2024-11-22 NOTE — PROGRESS NOTES
Physical Therapy Daily Treatment Note    Patient: Nabor Muhammad Dr.   : 1955  Referring practitioner: JASSI Smith  Date of Initial Visit: Type: THERAPY  Noted: 2024  Today's Date: 2024  Patient seen for 32 sessions       Visit Diagnoses:    ICD-10-CM ICD-9-CM   1. Difficulty walking due to knee joint  R26.2 719.7   2. Decreased ROM of left knee  M25.662 719.56   3. S/P total knee arthroplasty, left  Z96.652 V43.65   4. Acute pain of left knee  M25.562 719.46       Nabor Muhammad Dr. reports: he is doing better, but today he is getting some pain on the medial side of his knee.      Subjective       Objective          Active Range of Motion     Additional Active Range of Motion Details  Left knee supine flex AAROM 120 degrees       See Exercise, Manual, and Modality Logs for complete treatment.        Assessment & Plan       Assessment  Assessment details: Pt is tolerating treatment well and is maintaining his left knee AROM in flex and ext.  Pt reached 120 degrees of flexion today with some effort and pain at end range of motion.  Continued to hold WB strengthening exercises due to his left metatarsal Fxs.  Pt performed all OKC strengthening exercises without pain.  Ended treatment with IFC and cold to decrease pain and swelling in left knee.  Will see pt one more treatment for stretching and strengthening exercises per pt's tolerance.  Plan for discharge to a [facility] after next treatment.          Anticipate DC next Visit      Timed:         Manual Therapy:   8      mins  15238;     Therapeutic Exercise:    25     mins  29051;     Neuromuscular Tyrone:        mins  41900;    Therapeutic Activity:          mins  37223;     Gait Training:           mins  08765;     Ultrasound:          mins  78324;    Ionto                                   mins   10624  Self Care                            mins   03995  Canalith Repos         mins 98688      Un-Timed:  Electrical Stimulation:    15     mins   98980 ( );  Dry Needling          mins self-pay  Traction          mins 40745      Timed Treatment:  33    mins   Total Treatment:     60   mins    Graciela Trejo, PT  [place] License: [id number]

## 2024-11-27 DIAGNOSIS — S83.232A COMPLEX TEAR OF MEDIAL MENISCUS OF LEFT KNEE AS CURRENT INJURY, INITIAL ENCOUNTER: Primary | ICD-10-CM

## 2024-11-27 DIAGNOSIS — M84.469A INSUFFICIENCY FRACTURE OF TIBIA, INITIAL ENCOUNTER: ICD-10-CM

## 2024-11-27 DIAGNOSIS — M84.452A: ICD-10-CM

## 2024-12-05 DIAGNOSIS — Z96.652 STATUS POST TOTAL LEFT KNEE REPLACEMENT: ICD-10-CM

## 2024-12-05 RX ORDER — PREGABALIN 75 MG/1
75 CAPSULE ORAL EVERY 12 HOURS SCHEDULED
Qty: 60 CAPSULE | Refills: 0 | Status: SHIPPED | OUTPATIENT
Start: 2024-12-05

## 2024-12-05 NOTE — TELEPHONE ENCOUNTER
Rx Refill Note  Requested Prescriptions     Pending Prescriptions Disp Refills    pregabalin (LYRICA) 75 MG capsule 60 capsule 0     Sig: Take 1 capsule by mouth Every 12 (Twelve) Hours.      Last office visit with prescribing clinician: 10/9/2024      Next office visit with prescribing clinician: 1/6/2025   Last Filled:11/06/2024    Encounter Diagnosis   Name Primary?    Status post total left knee replacement       Date of Surgery: s/p left total knee arthroplasty, DOS 7/23/2024       Chastity Birmingham MA  12/05/24, 12:08 EST    Previous RX pended for your approval, change or denial.     {TIP  Encounters:    {TIP  Please add Last Relevant Lab Date if appropriate:  {TIP  Is Refill Pharmacy correct?:

## 2024-12-06 ENCOUNTER — TREATMENT (OUTPATIENT)
Dept: PHYSICAL THERAPY | Facility: CLINIC | Age: 69
End: 2024-12-06
Payer: COMMERCIAL

## 2024-12-06 DIAGNOSIS — R26.2 DIFFICULTY WALKING DUE TO KNEE JOINT: Primary | ICD-10-CM

## 2024-12-06 DIAGNOSIS — M25.562 ACUTE PAIN OF LEFT KNEE: ICD-10-CM

## 2024-12-06 DIAGNOSIS — M25.662 DECREASED ROM OF LEFT KNEE: ICD-10-CM

## 2024-12-06 DIAGNOSIS — Z96.652 S/P TOTAL KNEE ARTHROPLASTY, LEFT: ICD-10-CM

## 2024-12-06 NOTE — PROGRESS NOTES
Physical Therapy Daily Treatment Note/Discharge Note     Patient: Nabor Muhammad Dr.   : 1955  Referring practitioner: JASSI Smith  Date of Initial Visit: Type: THERAPY  Noted: 2024  Today's Date: 2024  Patient seen for 33 sessions       Visit Diagnoses:    ICD-10-CM ICD-9-CM   1. Difficulty walking due to knee joint  R26.2 719.7   2. S/P total knee arthroplasty, left  Z96.652 V43.65   3. Acute pain of left knee  M25.562 719.46   4. Decreased ROM of left knee  M25.662 719.56       Nabor Muhammad Dr. reports: his knee has been really stiff this week.  Pt states both knee have been hurting.      Subjective       Objective          Palpation   Left   Tenderness of the distal semitendinosus.     Additional Palpation Details  Left slight tenderness medial hamstring with palpation     Tenderness   Left Knee   Tenderness in the ITB. No tenderness in the lateral joint line.     Additional Tenderness Details  Slight left distal IT band tenderness with palpation     Active Range of Motion   Left Knee   Flexion: 120 degrees   Extension: 0 degrees     Patellar Mobility   Left Knee Patellar tendons within functional limits include the medial, lateral, superior and inferior.     Strength/Myotome Testing     Left Hip   Planes of Motion   Flexion: 5  Abduction: 5  Adduction: 5    Left Knee   Flexion: 4+  Extension: 5    Additional Strength Details  Pt with some left medial hamstring pain on resistance with knee flex.      Swelling     Left Knee Girth Measurement (cm)   Joint line: 44.4 cm  10 cm above joint line: 50 (SP) cm  10 cm below joint line: 40 (IP) cm    Ambulation     Observational Gait   Gait: within functional limits   Walking speed, stride length, right stance time, right swing time and right step length within functional limits.   Right foot contact pattern: heel to toe    Additional Observational Gait Details  Pt enters department WBAT left LE without an AD or antalgic gait getting heel  strike to toe off gait pattern.        See Exercise, Manual, and Modality Logs for complete treatment.       Assessment & Plan       Assessment  Assessment details: Pt is doing well, but still has c/o left knee pain and tightness.  Pt with stretching can reach to 0-120 degrees with some effort independently.  Pt has good hip and knee strength.  Pt with decreasing knee effusion, but still has some effusion in the suprapatellar pouch.  Pt is ambulating without an antalgic gait and can descend/ascend steps independently.  Pt reports having more of an effort descending steps, but can do it when he slows down.  Pt has met 4.5/5 STGs and 2/4 LTGs, but showing progress towards accomplishing all goals.  Pt is independently with his HEP and feel he can now manage his symptoms with continuing his HEP at least 3-4x/week.  Discharged pt today after treatment.           Goals  Plan Goals: STGs: 2-4 weeks  1. Decrease left knee pain to 5/10 with standing and ambulation.  MET   2. Increase left knee AROM 0-115 degrees for improved ability to dress his left LE.  MET  3.  Decrease left medial/lateral knee tenderness to minimal to none for improved ability to perform his ADLs.   MET   4.  Pt ambulates into clinic without an AD minimal antalgic gait left LE for improved balance and functional mobility  MET   5.  Decrease left knee effusion at girth measurements by 1-2 cm for improved ROM and ability to dress his left LE.  PARTIALLY MET, MET AT MJ AND IP MEASUREMENTS      LTGs: 5-8 weeks  1.  Pt Independent with HEP for self management of symptoms. MET  2.  Increase left knee AROM 0-125 degrees for improved ability to get into and out of his car and perform his ADLs.  PARTIALLY MET   3.  Increase left knee and hip strength to 5/5 for improved ability to stand, ambulate and climb steps.   PARTIALLY MET   4.  Pt ambulates left LE without an antalgic gait for improved mobility and balance to return to work  MET         Other      Timed:          Manual Therapy:   8      mins  33122;     Therapeutic Exercise:   10      mins  63808;     Neuromuscular Tyrone:        mins  69171;    Therapeutic Activity:     17     mins  74290;     Gait Training:           mins  83669;     Ultrasound:          mins  84473;    Ionto                                   mins   73907  Self Care                            mins   14665  Canalith Repos         mins 86149      Un-Timed:  Electrical Stimulation:         mins  99498 ( );  Dry Needling          mins self-pay  Traction          mins 86806      Timed Treatment:   35   mins   Total Treatment:    40    mins    Graciela Trejo, PT  [place] License: [id number]

## 2025-01-20 ENCOUNTER — OFFICE VISIT (OUTPATIENT)
Dept: ORTHOPEDIC SURGERY | Facility: CLINIC | Age: 70
End: 2025-01-20
Payer: COMMERCIAL

## 2025-01-20 VITALS — BODY MASS INDEX: 38.36 KG/M2 | HEIGHT: 71 IN | WEIGHT: 274 LBS

## 2025-01-20 DIAGNOSIS — M17.11 PRIMARY OSTEOARTHRITIS OF RIGHT KNEE: ICD-10-CM

## 2025-01-20 DIAGNOSIS — M25.561 RIGHT KNEE PAIN, UNSPECIFIED CHRONICITY: ICD-10-CM

## 2025-01-20 DIAGNOSIS — Z96.652 STATUS POST TOTAL LEFT KNEE REPLACEMENT: Primary | ICD-10-CM

## 2025-01-20 PROCEDURE — 99213 OFFICE O/P EST LOW 20 MIN: CPT | Performed by: ORTHOPAEDIC SURGERY

## 2025-01-20 NOTE — PROGRESS NOTES
Subjective:     Patient ID: Nabor Muhammad Dr. is a 69 y.o. male.    Chief Complaint:  Follow-up status post total knee arthroplasty-7/23/2024  Follow-up right knee pain, DJD    History of Present Illness  History of Present Illness  The patient returns to the clinic today for a follow-up evaluation of his left and right knees.    He reports that his left knee is generally in good condition, with some stiffness that resolves quickly, especially when he is cycling on his [employer]. He experiences minimal pain and irritation in the left knee. He does not experience any fevers, chills, sweats, numbness, tingling, buckling, catching, locking, or giving way.    However, he has been experiencing increasing pain in his right knee over the past 2 weeks. This began after an incident where he fell and landed directly on the anterior aspect of the knee. Since then, he has noticed increased pain, particularly over the medial and anterior aspects of the knee. He describes the pain as moderate in intensity, aching in nature, with occasional sharp pain. The pain is worse with deep flexion, stair climbing, and longer distance walking. He does not experience any hip or groin pain on the right side.     Social History     Occupational History    Not on file   Tobacco Use    Smoking status: Never     Passive exposure: Never    Smokeless tobacco: Never   Vaping Use    Vaping status: Never Used   Substance and Sexual Activity    Alcohol use: Yes     Comment: occ    Drug use: No    Sexual activity: Defer      Past Medical History:   Diagnosis Date    Arthritis     GERD (gastroesophageal reflux disease)     Hypertension     Lumbosacral disc disease     OA (osteoarthritis)     YANETH (obstructive sleep apnea)     CPAP    Spinal stenosis of lumbar region      Past Surgical History:   Procedure Laterality Date    CHOLECYSTECTOMY      FEMUR CLOSED REDUCTION Left 1970    KNEE ARTHROSCOPY Left 3/5/2024    Procedure: LEFT KNEE ARTHROSCOPY WITH OPEN  "ARTHROTOMY SUBCHONDROPLASTY, partial medial and lateral meniscectomy;  Surgeon: Terry Zarate MD;  Location:  [facility];  Service: Orthopedics;  Laterality: Left;    KNEE MENISCECTOMY Right 7/21/2021    Procedure: KNEE PARTIAL MEDIAL MENISCECTOMY,;  Surgeon: Terry Zarate MD;  Location:  [facility];  Service: Orthopedics;  Laterality: Right;    SHOULDER SURGERY Right 1998    TONSILLECTOMY AND ADENOIDECTOMY      TOTAL KNEE ARTHROPLASTY Left 7/23/2024    Procedure: TOTAL KNEE ARTHROPLASTY WITH TYRELL ROBOT;  Surgeon: Terry Zarate MD;  Location:  [facility];  Service: Robotics - Ortho;  Laterality: Left;    WRIST GANGLION EXCISION Bilateral     R-1990 L-1996       Family History   Problem Relation Age of Onset    Breast cancer Mother     COPD Father     Malig Hyperthermia Neg Hx          Review of Systems        Objective:  Vitals:    01/20/25 1134   Weight: 124 kg (274 lb)   Height: 180.3 cm (71\")         01/20/25  1134   Weight: 124 kg (274 lb)     Body mass index is 38.22 kg/m².  Physical Exam    Vital signs reviewed.   General: No acute distress, alert and oriented  Eyes: conjunctiva clear; pupils equally round and reactive  ENT: external ears and nose atraumatic; oropharynx clear  CV: no peripheral edema  Resp: normal respiratory effort  Skin: no rashes or wounds; normal turgor  Psych: mood and affect appropriate; recent and remote memory intact          Physical Exam  The left knee has a well-healed midline incision. Active range of motion is 0 to 125 degrees, with 4+ out of 5 strength on flexion and extension. The knee is stable to varus and valgus stress at zero and 30 degrees, with a good endpoint and posterior drawer at 90 degrees.     The right knee has an active range of motion of 3 to 120 degrees, with 4+ out of 5 strength on flexion and extension. There is maximal tenderness to palpation at the medial joint line as well as the lateral patellofemoral joint, with a positive active patellar " compression test. A positive Diana's test is noted with pain along the medial joint line. The knee is stable to varus and valgus stress at three and 30 degrees, with a grade 1A Lachman, and negative anterior and posterior drawer.         Imaging:  Left Knee X-Ray  Indication: s/p total knee     AP, Lateral, and [place] views    Findings:  Total knee arthroplasty components are in stable position with acceptable overall alignment, no evidence of periprosthetic fracture, loosening, osteolysis, or reactive bone formation.    Compared to prior postoperative x-rays      Right Knee X-Ray  Indication: Pain    AP, Lateral, and [place] views    Findings:  No fracture  No bony lesion  Normal soft tissues  Advanced tricompartmental osteoarthritis with bone-on-bone articulation of the lateral patellofemoral joint as well as near bone-on-bone articulation of medial compartment with overall varus alignment  Compared to prior office x-rays    Assessment:        1. Status post total left knee replacement    2. Right knee pain, unspecified chronicity    3. Primary osteoarthritis of right knee           Plan:          Assessment & Plan  1. Left knee pain.  The left knee appears to be stable and progressing reasonably well at this point in time. He notes some stiffness, but it resolves quickly, especially with cycling. He reports minimal pain and irritation. No fevers, chills, sweats, numbness, tingling, buckling, catching, locking, or giving way.    2. Right knee pain.  The right knee has increased pain over the last 2 weeks, particularly after a fall. Pain is moderate in intensity, aching in nature, with occasional sharp pain, worse with deep flexion, stair climbing, and longer distance walking. Examination reveals maximal tenderness to palpation at the medial joint line and lateral patellofemoral joint, positive Diana's test, and pain along the medial joint line. Given his underlying arthritic changes noted on x-rays,  viscosupplement injections will be administered to the right knee. A request for the injection was placed today. If symptoms do not improve, cortisone injection or surgical options may be considered.    Follow-up  The patient will follow up for the viscosupplement injection on the right knee.    Nabor Muhammad Dr. was in agreement with plan and had all questions answered.     Orders:  Orders Placed This Encounter   Procedures    XR Knee 3 View Bilateral    Visco Treatment       Medications:  No orders of the defined types were placed in this encounter.      Followup:  No follow-ups on file.    Diagnoses and all orders for this visit:    1. Status post total left knee replacement (Primary)  -     Cancel: XR Knee 3+ View With [place] Left  -     XR Knee 3 View Bilateral    2. Right knee pain, unspecified chronicity  -     Cancel: XR Knee 3+ View With [place] Right    3. Primary osteoarthritis of right knee  -     Visco Treatment; Future             Dictated utilizing Dragon dictation     Patient or patient representative verbalized consent for the use of Ambient Listening during the visit with  Terry Zarate MD for chart documentation. 1/20/2025  11:53 EST

## 2025-02-07 ENCOUNTER — CLINICAL SUPPORT (OUTPATIENT)
Dept: ORTHOPEDIC SURGERY | Facility: CLINIC | Age: 70
End: 2025-02-07
Payer: COMMERCIAL

## 2025-02-07 VITALS — WEIGHT: 274 LBS | HEIGHT: 71 IN | BODY MASS INDEX: 38.36 KG/M2

## 2025-02-07 DIAGNOSIS — M17.11 PRIMARY OSTEOARTHRITIS OF RIGHT KNEE: Primary | ICD-10-CM

## 2025-02-07 NOTE — PROGRESS NOTES
Large Joint Arthrocentesis: R knee  Date/Time: 2/7/2025 1:31 PM  Consent given by: patient  Site marked: site marked  Timeout: Immediately prior to procedure a time out was called to verify the correct patient, procedure, equipment, support staff and site/side marked as required   Supporting Documentation  Indications: pain   Procedure Details  Location: knee - R knee  Preparation: Patient was prepped and draped in the usual sterile fashion  Needle gauge: 21.  Approach: superior  Medications administered: 88 mg Hyaluronan 88 MG/4ML  Patient tolerance: patient tolerated the procedure well with no immediate complications    Patient presents to clinic today for right knee viscosupplement injections.  This is the single injection of the series.  I explained details of injections as well as risks, benefits and alternatives with the patient today, had all questions answered, wished to proceed with injections.  I will see patient back in 6 weeks for re-evaluation. Patient was instructed to watch for signs or symptoms of infection including redness, swelling, warmth to the touch, or significant increased pain and to contact our office immediately if any of these issues were noted.

## 2025-02-08 PROBLEM — M17.11 PRIMARY OSTEOARTHRITIS OF RIGHT KNEE: Status: ACTIVE | Noted: 2025-02-08

## 2025-03-17 ENCOUNTER — OFFICE VISIT (OUTPATIENT)
Dept: ORTHOPEDIC SURGERY | Facility: CLINIC | Age: 70
End: 2025-03-17
Payer: COMMERCIAL

## 2025-03-17 VITALS — WEIGHT: 274 LBS | BODY MASS INDEX: 38.36 KG/M2 | HEIGHT: 71 IN

## 2025-03-17 DIAGNOSIS — M17.11 PRIMARY OSTEOARTHRITIS OF RIGHT KNEE: Primary | ICD-10-CM

## 2025-03-17 DIAGNOSIS — M94.212 CHONDROMALACIA OF LEFT SHOULDER: ICD-10-CM

## 2025-03-17 PROCEDURE — 99214 OFFICE O/P EST MOD 30 MIN: CPT | Performed by: ORTHOPAEDIC SURGERY

## 2025-03-17 PROCEDURE — 20610 DRAIN/INJ JOINT/BURSA W/O US: CPT | Performed by: ORTHOPAEDIC SURGERY

## 2025-03-17 RX ADMIN — TRIAMCINOLONE ACETONIDE 80 MG: 40 INJECTION, SUSPENSION INTRA-ARTICULAR; INTRAMUSCULAR at 15:44

## 2025-03-17 RX ADMIN — LIDOCAINE HYDROCHLORIDE 8 ML: 10 INJECTION, SOLUTION EPIDURAL; INFILTRATION; INTRACAUDAL; PERINEURAL at 15:44

## 2025-03-17 RX ADMIN — TRIAMCINOLONE ACETONIDE 80 MG: 40 INJECTION, SUSPENSION INTRA-ARTICULAR; INTRAMUSCULAR at 15:46

## 2025-03-17 RX ADMIN — LIDOCAINE HYDROCHLORIDE 4 ML: 10 INJECTION, SOLUTION EPIDURAL; INFILTRATION; INTRACAUDAL; PERINEURAL at 15:46

## 2025-03-17 NOTE — PROGRESS NOTES
Subjective:     Patient ID: Nabor Muhammad Dr. is a 69 y.o. male.    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  Continue to have residual pain to his right knee localized particular to the medial and anterior aspect of his knee rates as a 6-7 out of 10 and aching in nature.  He states it is not as bad as his left knee got but it is increasing in intensity at this point in time is causing him limitations in regards to his functional activities.  Moderate swelling that does wax and wane.  Denies any micah locking or catching.  Denies hip or groin pain.    Regards to his left shoulder has been having increasing issues over the last 4 to 6 weeks localized per most anterior posterior glenohumeral joint, moderate associated crepitus, denies any specific injury.  Worse with lifting and overhead pushing especially with resistance.  Does note catching sensations occasionally but denies any micah dislocation.  Limited improvement with home exercise program.     Social History     Occupational History    Not on file   Tobacco Use    Smoking status: Never     Passive exposure: Never    Smokeless tobacco: Never   Vaping Use    Vaping status: Never Used   Substance and Sexual Activity    Alcohol use: Yes     Comment: occ    Drug use: No    Sexual activity: Defer      Past Medical History:   Diagnosis Date    Allergic     As a teen    Arthritis     Cancer     Melanoma    GERD (gastroesophageal reflux disease)     Hypertension     Injury of back     10 years ago    Lumbosacral disc disease     OA (osteoarthritis)     YANETH (obstructive sleep apnea)     CPAP    Spinal stenosis of lumbar region      Past Surgical History:   Procedure Laterality Date     "CHOLECYSTECTOMY      FEMUR CLOSED REDUCTION Left 1970    KNEE ARTHROSCOPY Left 3/5/2024    Procedure: LEFT KNEE ARTHROSCOPY WITH OPEN ARTHROTOMY SUBCHONDROPLASTY, partial medial and lateral meniscectomy;  Surgeon: Terry Zarate MD;  Location:  [facility];  Service: Orthopedics;  Laterality: Left;    KNEE MENISCECTOMY Right 7/21/2021    Procedure: KNEE PARTIAL MEDIAL MENISCECTOMY,;  Surgeon: Terry Zarate MD;  Location:  [facility];  Service: Orthopedics;  Laterality: Right;    SHOULDER SURGERY Right 1998    TONSILLECTOMY AND ADENOIDECTOMY      TOTAL KNEE ARTHROPLASTY Left 7/23/2024    Procedure: TOTAL KNEE ARTHROPLASTY WITH TYRELL ROBOT;  Surgeon: Terry Zarate MD;  Location:  [facility];  Service: Robotics - Ortho;  Laterality: Left;    WRIST GANGLION EXCISION Bilateral     R-1990 L-1996       Family History   Problem Relation Age of Onset    Breast cancer Mother     COPD Father     Malig Hyperthermia Neg Hx          Review of Systems        Objective:  Vitals:    03/17/25 1452   Weight: 124 kg (274 lb)   Height: 180.3 cm (71\")         03/17/25  1452   Weight: 124 kg (274 lb)     Body mass index is 38.22 kg/m².  Physical Exam    Vital signs reviewed.   General: No acute distress, alert and oriented  Eyes: conjunctiva clear; pupils equally round and reactive  ENT: external ears and nose atraumatic; oropharynx clear  CV: no peripheral edema  Resp: normal respiratory effort  Skin: no rashes or wounds; normal turgor  Psych: mood and affect appropriate; recent and remote memory intact          Physical Exam         The right knee has an active range of motion of 3 to 120 degrees, with 4+ out of 5 strength on flexion and extension. There is maximal tenderness to palpation at the medial joint line as well as the lateral patellofemoral joint, with a positive active patellar compression test. A positive Diana's test is noted with pain along the medial joint line. The knee is stable to varus and valgus stress at " three and 30 degrees, with a grade 1A Lachman, and negative anterior and posterior drawer.     Left shoulder-maximal tenderness palpation anterior posterior glenohumeral joint, moderate crepitus, active forward flexion 170 degrees with 4 out of 5 strength active external rotation 55 degrees 4+ out of 5 strength, internal rotation T10 5 out of 5 strength belly press test.  Moderately positive Loera and Neer's, negative drop arm test, negative external rotation lag sign, mildly positive empty can test.  Positive Yergason and speeds, equivocal [place]'s    Imaging:  None today  Assessment:        1. Primary osteoarthritis of right knee    2. Chondromalacia of left shoulder           Plan:     Large Joint Arthrocentesis: R knee  Date/Time: 3/17/2025 3:44 PM  Consent given by: patient  Site marked: site marked  Timeout: Immediately prior to procedure a time out was called to verify the correct patient, procedure, equipment, support staff and site/side marked as required   Supporting Documentation  Indications: pain   Procedure Details  Location: knee - R knee  Preparation: Patient was prepped and draped in the usual sterile fashion  Needle size: 22 G  Approach: anterolateral  Medications administered: 8 mL lidocaine PF 1% 1 %; 80 mg triamcinolone acetonide 40 MG/ML  Patient tolerance: patient tolerated the procedure well with no immediate complications      Large Joint Arthrocentesis: L glenohumeral  Date/Time: 3/17/2025 3:46 PM  Consent given by: patient  Site marked: site marked  Timeout: Immediately prior to procedure a time out was called to verify the correct patient, procedure, equipment, support staff and site/side marked as required   Procedure Details  Location: shoulder - L glenohumeral  Preparation: Patient was prepped and draped in the usual sterile fashion  Needle size: 22 G  Approach: anterior  Medications administered: 4 mL lidocaine PF 1% 1 %; 80 mg triamcinolone acetonide 40 MG/ML  Patient tolerance:  patient tolerated the procedure well with no immediate complications            Assessment & Plan  Discussed treatment options length with patient including but not limited to total knee arthroplasty for right knee as well as repeat injection.  He want to proceed with injection today to try to get him more functional as the weather is improving and to try to get back into doing some hunting.  His knee is progressively getting worse in regards to his pain and symptomatology with decreasing efficacy with conservative treatment but he wants to hold off on surgery at this point.    In regard to his left shoulder we also discussed treatment options including further workup and evaluation with advanced imaging but he has had good response with a remote history of injection and given his increased level of pain would like to proceed with that today.  If no improvement noted will consider MRI-will need x-rays left shoulder at follow-up visit.    Patient would like to proceed with cortisone injection today to the Right knee and left shoulder glenohumeral joint. Recommended limited use of affected extremity for the next 24 hours to only essential activites other than work on general active and passive motion. Recommended supplementing with ice and soft tissue massage. Discussed with patient that they should see results in 5-7 days, if no improvement in 5-6 weeks I have asked them to call the office to review other options. Patient should call office immediately if they notice redness, warmth, fevers, chills, or residual numbness or tingling for greater than 6 hours after injection.       Nabor Muhammad Dr. was in agreement with plan and had all questions answered.     Orders:  Orders Placed This Encounter   Procedures    Large Joint Arthrocentesis: R knee    Large Joint Arthrocentesis: L glenohumeral       Medications:  No orders of the defined types were placed in this encounter.      Followup:  No follow-ups on  file.    Diagnoses and all orders for this visit:    1. Primary osteoarthritis of right knee (Primary)    2. Chondromalacia of left shoulder    Other orders  -     Large Joint Arthrocentesis: R knee  -     Large Joint Arthrocentesis: L glenohumeral                  Dictated utilizing Dragon dictation     Patient or patient representative verbalized consent for the use of Ambient Listening during the visit with  Terry Zarate MD for chart documentation. 3/24/2025  15:00 EDT

## 2025-03-24 RX ORDER — TRIAMCINOLONE ACETONIDE 40 MG/ML
80 INJECTION, SUSPENSION INTRA-ARTICULAR; INTRAMUSCULAR
Status: COMPLETED | OUTPATIENT
Start: 2025-03-17 | End: 2025-03-17

## 2025-03-24 RX ORDER — LIDOCAINE HYDROCHLORIDE 10 MG/ML
4 INJECTION, SOLUTION EPIDURAL; INFILTRATION; INTRACAUDAL; PERINEURAL
Status: COMPLETED | OUTPATIENT
Start: 2025-03-17 | End: 2025-03-17

## 2025-03-24 RX ORDER — LIDOCAINE HYDROCHLORIDE 10 MG/ML
8 INJECTION, SOLUTION EPIDURAL; INFILTRATION; INTRACAUDAL; PERINEURAL
Status: COMPLETED | OUTPATIENT
Start: 2025-03-17 | End: 2025-03-17

## 2025-08-28 DIAGNOSIS — R94.4 DECREASED GFR: ICD-10-CM

## 2025-08-28 DIAGNOSIS — E55.9 VITAMIN D DEFICIENCY: Primary | ICD-10-CM

## 2025-08-28 DIAGNOSIS — N40.0 BENIGN PROSTATIC HYPERPLASIA, UNSPECIFIED WHETHER LOWER URINARY TRACT SYMPTOMS PRESENT: ICD-10-CM

## 2025-08-28 DIAGNOSIS — I10 PRIMARY HYPERTENSION: ICD-10-CM

## 2025-08-28 DIAGNOSIS — E78.00 ELEVATED LDL CHOLESTEROL LEVEL: ICD-10-CM

## (undated) DEVICE — SUT MNCRYL 4/0 PS2 18 IN

## (undated) DEVICE — DRP SURG U/DRP INVISISHIELD PA 48X52IN

## (undated) DEVICE — WEBRIL* CAST PADDING: Brand: DEROYAL

## (undated) DEVICE — LAG ARTHROSCOPY: Brand: MEDLINE INDUSTRIES, INC.

## (undated) DEVICE — TRANSPOSAL ULTRAFLEX DUO/QUAD ULTRA CART MANIFOLD

## (undated) DEVICE — SYR CONTRL PRESS/LO FIX/M/LL W/THMB/RNG 10ML

## (undated) DEVICE — DRESSING,GAUZE,XEROFORM,CURAD,1"X8",ST: Brand: CURAD

## (undated) DEVICE — SUT VIC 2/0 CT1 CR8 18IN J839D

## (undated) DEVICE — WEREWOLF FLOW 50 COBLATION WAND: Brand: COBLATION

## (undated) DEVICE — YANKAUER,BULB TIP,W/O VENT,RIGID,STERILE: Brand: MEDLINE

## (undated) DEVICE — SOL IRR H2O BTL 1000ML STRL

## (undated) DEVICE — ST TB GOFLO STRL

## (undated) DEVICE — TOWEL,OR,DSP,ST,BLUE,STD,4/PK,20PK/CS: Brand: MEDLINE

## (undated) DEVICE — DRSNG SURESITE WNDW 4X4.5

## (undated) DEVICE — SOL ISO/ALC RUB 70PCT 4OZ

## (undated) DEVICE — ACTIVE WRAP®, KNEE/LEG: Brand: DEROYAL

## (undated) DEVICE — IMMOB KN 3PNL DLX CANVS 22IN BLU

## (undated) DEVICE — PREP SOL POVIDONE/IODINE BT 4OZ

## (undated) DEVICE — KT MIX CMT PALAMIX/UNO VAC WO/CMT

## (undated) DEVICE — ARGYLE FRAZIER SURGICAL SUCTION INSTRUMENT 10 FR/CH (3.3 MM): Brand: ARGYLE

## (undated) DEVICE — DUAL CUT SAGITTAL BLADE

## (undated) DEVICE — SPNG GZ WOVN 4X4IN 12PLY 10/BX STRL

## (undated) DEVICE — APPL CHLORAPREP HI/LITE 26ML ORNG

## (undated) DEVICE — Device

## (undated) DEVICE — DECANTER: Brand: UNBRANDED

## (undated) DEVICE — WRAP KN COMPR COLD/THERP

## (undated) DEVICE — PENCL SMOKE/EVAC MEGADYNE TELESCP 10FT

## (undated) DEVICE — PUMP PAIN AUTOFUSER AUTO SELCT NOBOLUS 1TO14ML/HR 550ML DISP

## (undated) DEVICE — SYR LL TP 10ML STRL

## (undated) DEVICE — GLV SURG SENSICARE PI PF LF 7 GRN STRL

## (undated) DEVICE — TUBING, SUCTION, 3/16" X 10', STRAIGHT: Brand: MEDLINE

## (undated) DEVICE — TRANSFER SET 3": Brand: MEDLINE INDUSTRIES, INC.

## (undated) DEVICE — FOAM BUMP ROUND LARGE: Brand: MEDLINE INDUSTRIES, INC.

## (undated) DEVICE — CVR HNDL LT SURG ACCSSRY BLU STRL

## (undated) DEVICE — INTENDED FOR TISSUE SEPARATION, AND OTHER PROCEDURES THAT REQUIRE A SHARP SURGICAL BLADE TO PUNCTURE OR CUT.: Brand: BARD-PARKER ® STAINLESS STEEL BLADES

## (undated) DEVICE — GLV SURG SENSICARE PI LF PF 7.0

## (undated) DEVICE — DISPOSABLE TOURNIQUET CUFF 34"X4", 1-LINE, BLUE, STERILE, 1EA/PK, 10PK/CS: Brand: ASP MEDICAL

## (undated) DEVICE — SKIN PREP TRAY W/CHG: Brand: MEDLINE INDUSTRIES, INC.

## (undated) DEVICE — ADAPT DB SPIKE 2PCT FOR AR6400 TBG

## (undated) DEVICE — GLV SURG NEOLON 2G PF LF 7.5 STRL

## (undated) DEVICE — 4.5 MM CONCAVE, FULL RADIUS,                                    CURVE, CURVED BLADES, POWER/EP-1,                                    YELLOW, CURVE LENGHTS 17 MM,                                    PACKAGED 6 PER BOX, STERILE

## (undated) DEVICE — DISPOSABLE TOURNIQUET CUFF SINGLE BLADDER, SINGLE PORT AND LUER LOCK CONNECTOR: Brand: COLOR CUFF

## (undated) DEVICE — MAT FLR ABSORBENT LG 4FT 10 2.5FT

## (undated) DEVICE — SUT ETHLN 3/0 PS2 18 IN 1669H

## (undated) DEVICE — DRSNG PAD ABD 8X10IN STRL

## (undated) DEVICE — SUT VIC 0 CT1 36IN J946H

## (undated) DEVICE — 3M™ IOBAN™ 2 ANTIMICROBIAL INCISE DRAPE 6650EZ: Brand: IOBAN™ 2

## (undated) DEVICE — NDL HYPO SFTY GLD 22G 1 1/2IN

## (undated) DEVICE — DRAPE,REIN 53X77,STERILE: Brand: MEDLINE

## (undated) DEVICE — SYS CLS SKIN PREMIERPRO EXOFINFUSION 22CM

## (undated) DEVICE — GLV SURG SENSICARE PF POLYISPRN SZ8 LF

## (undated) DEVICE — DRP ROBOTIC ROSA BX/20

## (undated) DEVICE — PK KN TOTL 90

## (undated) DEVICE — 3M™ DURAPORE™ SURGICAL TAPE 2 INCHES X 10YARDS (5.0CM X 9.1M) 6ROLLS/CARTON 10CARTONS/CASE 1538-2: Brand: 3M™ DURAPORE™

## (undated) DEVICE — Device: Brand: ACCUPORT® CANNULA

## (undated) DEVICE — LEGGINGS, PAIR, CLEAR, STERILE: Brand: MEDLINE

## (undated) DEVICE — DRP C/ARMOR

## (undated) DEVICE — DECANTER BAG 9": Brand: MEDLINE INDUSTRIES, INC.

## (undated) DEVICE — DRSNG GZ PETROLTM XEROFORM CURAD 1X8IN STRL

## (undated) DEVICE — PAD,NON-ADHERENT,3X8,STERILE,LF,1/PK: Brand: MEDLINE

## (undated) DEVICE — INTENDED USE FOR SURGICAL MARKING ON INTACT SKIN, ALSO PROVIDES A PERMANENT METHOD OF IDENTIFYING OBJECTS IN THE OPERATING ROOM: Brand: WRITESITE® REGULAR TIP SKIN MARKER

## (undated) DEVICE — CLEAR-TRAC DISPOSABLE STOPCOCK: Brand: CLEAR-TRAC

## (undated) DEVICE — DRP C/ARM 41X74IN